# Patient Record
Sex: FEMALE | Race: BLACK OR AFRICAN AMERICAN | Employment: FULL TIME | URBAN - METROPOLITAN AREA
[De-identification: names, ages, dates, MRNs, and addresses within clinical notes are randomized per-mention and may not be internally consistent; named-entity substitution may affect disease eponyms.]

---

## 2018-10-21 ENCOUNTER — HOSPITAL ENCOUNTER (EMERGENCY)
Age: 38
Discharge: HOME | End: 2018-10-21
Attending: EMERGENCY MEDICINE
Payer: COMMERCIAL

## 2018-10-21 VITALS
TEMPERATURE: 97.4 F | RESPIRATION RATE: 16 BRPM | SYSTOLIC BLOOD PRESSURE: 136 MMHG | DIASTOLIC BLOOD PRESSURE: 87 MMHG | WEIGHT: 213.63 LBS | BODY MASS INDEX: 35.55 KG/M2 | HEART RATE: 79 BPM | OXYGEN SATURATION: 100 %

## 2018-10-21 DIAGNOSIS — B35.6 TINEA CRURIS: Primary | ICD-10-CM

## 2018-10-21 LAB
BACTERIA: ABNORMAL /HPF
BILIRUBIN URINE: NEGATIVE
BLOOD, URINE: NEGATIVE
CLARITY: ABNORMAL
COLOR: YELLOW
EPITHELIAL CELLS, UA: 15 /HPF (ref 0–5)
GLUCOSE URINE: NEGATIVE MG/DL
HCG(URINE) PREGNANCY TEST: NEGATIVE
HYALINE CASTS: 5 /LPF (ref 0–8)
KETONES, URINE: NEGATIVE MG/DL
LEUKOCYTE ESTERASE, URINE: NEGATIVE
MICROSCOPIC EXAMINATION: YES
NITRITE, URINE: NEGATIVE
PH UA: 7.5
PROTEIN UA: NEGATIVE MG/DL
RBC UA: 2 /HPF (ref 0–4)
SPECIFIC GRAVITY UA: 1.01
URINE REFLEX TO CULTURE: YES
URINE TYPE: ABNORMAL
UROBILINOGEN, URINE: 0.2 E.U./DL
WBC UA: 6 /HPF (ref 0–5)

## 2018-10-21 PROCEDURE — 87086 URINE CULTURE/COLONY COUNT: CPT

## 2018-10-21 PROCEDURE — 81001 URINALYSIS AUTO W/SCOPE: CPT

## 2018-10-21 PROCEDURE — 84703 CHORIONIC GONADOTROPIN ASSAY: CPT

## 2018-10-21 PROCEDURE — 99283 EMERGENCY DEPT VISIT LOW MDM: CPT

## 2018-10-21 ASSESSMENT — PAIN SCALES - GENERAL: PAINLEVEL_OUTOF10: 0

## 2018-10-21 NOTE — ED PROVIDER NOTES
mg by mouth daily      omeprazole (PRILOSEC) 20 MG delayed release capsule Take 20 mg by mouth daily      ibuprofen (ADVIL;MOTRIN) 800 MG tablet Take 800 mg by mouth every 8 hours as needed for Pain      albuterol sulfate HFA (PROVENTIL HFA) 108 (90 Base) MCG/ACT inhaler Inhale 2 puffs every 4 hours while awake for 7-10 days, then every 6 hours as needed for wheezing. Dispense with SPACER and instruct on use. May substitute Ventolin or Proventil as needed per insurance. 1 Inhaler 0    lidocaine (LIDODERM) 5 % Place 1 patch onto the skin daily 12 hours on, 12 hours off. **May substitute OTC patches if Rx not covered by insurance** 10 patch 0    cyclobenzaprine (FLEXERIL) 10 MG tablet Take 1 tablet by mouth 3 times daily as needed for Muscle spasms 30 tablet 0    acetaminophen (APAP EXTRA STRENGTH) 500 MG tablet Take 2 tablets by mouth every 6 hours as needed for Pain DO NOT TAKE WITH OTHER MEDICATIONS CONTAINING ACETAMINOPHEN. 30 tablet 0    ibuprofen (ADVIL;MOTRIN) 600 MG tablet Take 1 tablet by mouth every 6 hours as needed for Pain 30 tablet 0    naproxen (NAPROSYN) 500 MG tablet Take 1 tablet by mouth 2 times daily for 20 doses. 20 tablet 0     Allergies   Allergen Reactions    Other      Medications used to put her to sleep during surgery       Nursing Notes Reviewed    Physical Exam:  ED Triage Vitals [10/21/18 0416]   BP Temp Temp Source Pulse Resp SpO2 Height Weight   130/83 97.4 °F (36.3 °C) Oral 81 16 94 % -- 213 lb 10 oz (96.9 kg)     GENERAL APPEARANCE: Awake and alert. Cooperative. No acute distress. : Area of erythema and irritation externally above the vagina, and also in the lower perineal area. No vaginal discharge. No vesicles or ulcerations noted to the vaginal area. EXTREMITIES: No acute deformities. SKIN: Warm and dry. NEUROLOGICAL: No gross facial drooping. Moves all 4 extremitiesspontaneously.     Physical Exam    I have reviewed and interpreted all of the currently

## 2018-10-22 LAB — URINE CULTURE, ROUTINE: NORMAL

## 2018-11-28 ENCOUNTER — HOSPITAL ENCOUNTER (EMERGENCY)
Age: 38
Discharge: HOME | End: 2018-11-28
Attending: EMERGENCY MEDICINE
Payer: COMMERCIAL

## 2018-11-28 VITALS
RESPIRATION RATE: 14 BRPM | WEIGHT: 217.81 LBS | HEIGHT: 65 IN | DIASTOLIC BLOOD PRESSURE: 93 MMHG | HEART RATE: 62 BPM | TEMPERATURE: 98.4 F | BODY MASS INDEX: 36.29 KG/M2 | SYSTOLIC BLOOD PRESSURE: 134 MMHG | OXYGEN SATURATION: 100 %

## 2018-11-28 DIAGNOSIS — R42 VERTIGO: Primary | ICD-10-CM

## 2018-11-28 PROCEDURE — 6370000000 HC RX 637 (ALT 250 FOR IP): Performed by: EMERGENCY MEDICINE

## 2018-11-28 PROCEDURE — 99283 EMERGENCY DEPT VISIT LOW MDM: CPT

## 2018-11-28 RX ORDER — MECLIZINE HCL 12.5 MG/1
25 TABLET ORAL ONCE
Status: COMPLETED | OUTPATIENT
Start: 2018-11-28 | End: 2018-11-28

## 2018-11-28 RX ORDER — MECLIZINE HYDROCHLORIDE 25 MG/1
25 TABLET ORAL 3 TIMES DAILY PRN
Qty: 30 TABLET | Refills: 0 | Status: SHIPPED | OUTPATIENT
Start: 2018-11-28 | End: 2018-12-08

## 2018-11-28 RX ADMIN — MECLIZINE 25 MG: 12.5 TABLET ORAL at 03:51

## 2018-11-28 ASSESSMENT — PAIN SCALES - GENERAL: PAINLEVEL_OUTOF10: 0

## 2018-11-28 NOTE — ED PROVIDER NOTES
insurance. AMLODIPINE (NORVASC) 2.5 MG TABLET    Take 2.5 mg by mouth daily    CYCLOBENZAPRINE (FLEXERIL) 10 MG TABLET    Take 1 tablet by mouth 3 times daily as needed for Muscle spasms    IBUPROFEN (ADVIL;MOTRIN) 600 MG TABLET    Take 1 tablet by mouth every 6 hours as needed for Pain    IBUPROFEN (ADVIL;MOTRIN) 800 MG TABLET    Take 800 mg by mouth every 8 hours as needed for Pain    LIDOCAINE (LIDODERM) 5 %    Place 1 patch onto the skin daily 12 hours on, 12 hours off. **May substitute OTC patches if Rx not covered by insurance**    NAPROXEN (NAPROSYN) 500 MG TABLET    Take 1 tablet by mouth 2 times daily for 20 doses. NYSTATIN-TRIAMCINOLONE (MYCOLOG II) 625224-8.1 UNIT/GM-% CREAM    Apply topically 3 times daily. OMEPRAZOLE (PRILOSEC) 20 MG DELAYED RELEASE CAPSULE    Take 20 mg by mouth daily       ALLERGIES     Other    FAMILY HISTORY     History reviewed. No pertinent family history. SOCIAL HISTORY       Social History     Social History    Marital status: Single     Spouse name: N/A    Number of children: N/A    Years of education: N/A     Social History Main Topics    Smoking status: Former Smoker     Packs/day: 0.50     Types: Cigars    Smokeless tobacco: Never Used    Alcohol use No      Comment: occ    Drug use: Yes     Types: Marijuana      Comment: occ    Sexual activity: Not Currently      Comment: occasional     Other Topics Concern    None     Social History Narrative    ** Merged History Encounter **            SCREENINGS      @FLOW([id number])@      PHYSICAL EXAM    (up to 7 for level 4, 8 or more for level 5)     ED Triage Vitals [11/28/18 0344]   BP Temp Temp Source Pulse Resp SpO2 Height Weight   (!) 147/106 98.4 °F (36.9 °C) Oral 58 16 -- 5' 5\" (1.651 m) 217 lb 13 oz (98.8 kg)       Physical Exam      General Appearance:  Alert, cooperative, no distress, appears stated age. Head:  Normocephalic, without obviousabnormality, atraumatic.    Eyes:  conjunctiva/corneas

## 2019-01-24 ENCOUNTER — HOSPITAL ENCOUNTER (EMERGENCY)
Age: 39
Discharge: HOME | End: 2019-01-24
Attending: EMERGENCY MEDICINE
Payer: COMMERCIAL

## 2019-01-24 ENCOUNTER — APPOINTMENT (OUTPATIENT)
Dept: GENERAL RADIOLOGY | Age: 39
End: 2019-01-24
Payer: COMMERCIAL

## 2019-01-24 VITALS
BODY MASS INDEX: 32.99 KG/M2 | OXYGEN SATURATION: 98 % | WEIGHT: 198 LBS | RESPIRATION RATE: 16 BRPM | TEMPERATURE: 98.5 F | HEIGHT: 65 IN | SYSTOLIC BLOOD PRESSURE: 167 MMHG | DIASTOLIC BLOOD PRESSURE: 113 MMHG | HEART RATE: 68 BPM

## 2019-01-24 DIAGNOSIS — R07.89 CHEST WALL PAIN: Primary | ICD-10-CM

## 2019-01-24 LAB
A/G RATIO: 1.6 (ref 1.1–2.2)
ALBUMIN SERPL-MCNC: 4.5 G/DL (ref 3.4–5)
ALP BLD-CCNC: 82 U/L (ref 40–129)
ALT SERPL-CCNC: 11 U/L (ref 10–40)
ANION GAP SERPL CALCULATED.3IONS-SCNC: 9 MMOL/L (ref 3–16)
AST SERPL-CCNC: 13 U/L (ref 15–37)
BASOPHILS ABSOLUTE: 0 K/UL (ref 0–0.2)
BASOPHILS RELATIVE PERCENT: 0.9 %
BILIRUB SERPL-MCNC: 0.3 MG/DL (ref 0–1)
BUN BLDV-MCNC: 11 MG/DL (ref 7–20)
CALCIUM SERPL-MCNC: 9.8 MG/DL (ref 8.3–10.6)
CHLORIDE BLD-SCNC: 101 MMOL/L (ref 99–110)
CO2: 26 MMOL/L (ref 21–32)
CREAT SERPL-MCNC: 0.7 MG/DL (ref 0.6–1.1)
EKG ATRIAL RATE: 71 BPM
EKG DIAGNOSIS: NORMAL
EKG P AXIS: 58 DEGREES
EKG P-R INTERVAL: 136 MS
EKG Q-T INTERVAL: 356 MS
EKG QRS DURATION: 80 MS
EKG QTC CALCULATION (BAZETT): 386 MS
EKG R AXIS: 35 DEGREES
EKG T AXIS: 56 DEGREES
EKG VENTRICULAR RATE: 71 BPM
EOSINOPHILS ABSOLUTE: 0.3 K/UL (ref 0–0.6)
EOSINOPHILS RELATIVE PERCENT: 5 %
GFR AFRICAN AMERICAN: >60
GFR NON-AFRICAN AMERICAN: >60
GLOBULIN: 2.9 G/DL
GLUCOSE BLD-MCNC: 85 MG/DL (ref 70–99)
HCT VFR BLD CALC: 32.9 % (ref 36–48)
HEMOGLOBIN: 10.4 G/DL (ref 12–16)
LYMPHOCYTES ABSOLUTE: 1.7 K/UL (ref 1–5.1)
LYMPHOCYTES RELATIVE PERCENT: 28.4 %
MCH RBC QN AUTO: 24.5 PG (ref 26–34)
MCHC RBC AUTO-ENTMCNC: 31.6 G/DL (ref 31–36)
MCV RBC AUTO: 77.4 FL (ref 80–100)
MONOCYTES ABSOLUTE: 0.5 K/UL (ref 0–1.3)
MONOCYTES RELATIVE PERCENT: 8.5 %
NEUTROPHILS ABSOLUTE: 3.3 K/UL (ref 1.7–7.7)
NEUTROPHILS RELATIVE PERCENT: 57.2 %
PDW BLD-RTO: 17.1 % (ref 12.4–15.4)
PLATELET # BLD: 329 K/UL (ref 135–450)
PMV BLD AUTO: 8 FL (ref 5–10.5)
POTASSIUM SERPL-SCNC: 4.1 MMOL/L (ref 3.5–5.1)
RBC # BLD: 4.24 M/UL (ref 4–5.2)
SODIUM BLD-SCNC: 136 MMOL/L (ref 136–145)
TOTAL PROTEIN: 7.4 G/DL (ref 6.4–8.2)
TROPONIN: <0.01 NG/ML
TROPONIN: <0.01 NG/ML
WBC # BLD: 5.8 K/UL (ref 4–11)

## 2019-01-24 PROCEDURE — 80053 COMPREHEN METABOLIC PANEL: CPT

## 2019-01-24 PROCEDURE — 93010 ELECTROCARDIOGRAM REPORT: CPT | Performed by: INTERNAL MEDICINE

## 2019-01-24 PROCEDURE — 84484 ASSAY OF TROPONIN QUANT: CPT

## 2019-01-24 PROCEDURE — 99285 EMERGENCY DEPT VISIT HI MDM: CPT

## 2019-01-24 PROCEDURE — 71046 X-RAY EXAM CHEST 2 VIEWS: CPT

## 2019-01-24 PROCEDURE — 85025 COMPLETE CBC W/AUTO DIFF WBC: CPT

## 2019-01-24 PROCEDURE — 93005 ELECTROCARDIOGRAM TRACING: CPT | Performed by: EMERGENCY MEDICINE

## 2019-01-24 RX ORDER — AMLODIPINE BESYLATE 10 MG/1
10 TABLET ORAL DAILY
COMMUNITY
End: 2021-06-25

## 2019-01-24 RX ORDER — IBUPROFEN 600 MG/1
600 TABLET ORAL EVERY 6 HOURS PRN
Qty: 30 TABLET | Refills: 0 | Status: SHIPPED | OUTPATIENT
Start: 2019-01-24 | End: 2019-02-03 | Stop reason: ALTCHOICE

## 2019-01-24 ASSESSMENT — PAIN DESCRIPTION - FREQUENCY: FREQUENCY: CONTINUOUS

## 2019-01-24 ASSESSMENT — PAIN SCALES - GENERAL: PAINLEVEL_OUTOF10: 5

## 2019-01-24 ASSESSMENT — PAIN DESCRIPTION - DESCRIPTORS: DESCRIPTORS: PRESSURE

## 2019-01-24 ASSESSMENT — HEART SCORE: ECG: 0

## 2019-01-24 ASSESSMENT — PAIN DESCRIPTION - LOCATION: LOCATION: CHEST

## 2019-01-24 ASSESSMENT — PAIN DESCRIPTION - PAIN TYPE: TYPE: ACUTE PAIN

## 2019-02-03 ENCOUNTER — HOSPITAL ENCOUNTER (EMERGENCY)
Age: 39
Discharge: HOME | End: 2019-02-03

## 2019-02-03 VITALS
BODY MASS INDEX: 35.04 KG/M2 | OXYGEN SATURATION: 100 % | HEIGHT: 65 IN | SYSTOLIC BLOOD PRESSURE: 114 MMHG | RESPIRATION RATE: 16 BRPM | HEART RATE: 87 BPM | DIASTOLIC BLOOD PRESSURE: 61 MMHG | TEMPERATURE: 98.6 F | WEIGHT: 210.32 LBS

## 2019-02-03 DIAGNOSIS — M54.31 BILATERAL SCIATICA: Primary | ICD-10-CM

## 2019-02-03 DIAGNOSIS — M54.32 BILATERAL SCIATICA: Primary | ICD-10-CM

## 2019-02-03 LAB
BILIRUBIN URINE: NEGATIVE
BLOOD, URINE: NEGATIVE
CLARITY: ABNORMAL
COLOR: YELLOW
EPITHELIAL CELLS, UA: 4 /HPF (ref 0–5)
GLUCOSE URINE: NEGATIVE MG/DL
HCG(URINE) PREGNANCY TEST: NEGATIVE
HYALINE CASTS: 7 /LPF (ref 0–8)
KETONES, URINE: NEGATIVE MG/DL
LEUKOCYTE ESTERASE, URINE: NEGATIVE
MICROSCOPIC EXAMINATION: YES
NITRITE, URINE: NEGATIVE
PH UA: 6.5
PROTEIN UA: NEGATIVE MG/DL
RBC UA: 2 /HPF (ref 0–4)
SPECIFIC GRAVITY UA: 1.02
UROBILINOGEN, URINE: 0.2 E.U./DL
WBC UA: 1 /HPF (ref 0–5)

## 2019-02-03 PROCEDURE — 81001 URINALYSIS AUTO W/SCOPE: CPT

## 2019-02-03 PROCEDURE — 6370000000 HC RX 637 (ALT 250 FOR IP): Performed by: NURSE PRACTITIONER

## 2019-02-03 PROCEDURE — 84703 CHORIONIC GONADOTROPIN ASSAY: CPT

## 2019-02-03 PROCEDURE — 99283 EMERGENCY DEPT VISIT LOW MDM: CPT

## 2019-02-03 RX ORDER — PREDNISONE 20 MG/1
60 TABLET ORAL ONCE
Status: COMPLETED | OUTPATIENT
Start: 2019-02-03 | End: 2019-02-03

## 2019-02-03 RX ORDER — PREDNISONE 20 MG/1
TABLET ORAL
Qty: 18 TABLET | Refills: 0 | Status: SHIPPED | OUTPATIENT
Start: 2019-02-03 | End: 2019-02-13

## 2019-02-03 RX ORDER — CYCLOBENZAPRINE HCL 10 MG
10 TABLET ORAL ONCE
Status: COMPLETED | OUTPATIENT
Start: 2019-02-03 | End: 2019-02-03

## 2019-02-03 RX ORDER — CYCLOBENZAPRINE HCL 10 MG
10 TABLET ORAL 3 TIMES DAILY PRN
Qty: 30 TABLET | Refills: 0 | Status: SHIPPED | OUTPATIENT
Start: 2019-02-03 | End: 2019-02-13

## 2019-02-03 RX ADMIN — CYCLOBENZAPRINE HYDROCHLORIDE 10 MG: 10 TABLET, FILM COATED ORAL at 12:29

## 2019-02-03 RX ADMIN — PREDNISONE 60 MG: 20 TABLET ORAL at 12:29

## 2019-02-03 ASSESSMENT — PAIN DESCRIPTION - ORIENTATION: ORIENTATION: LOWER

## 2019-02-03 ASSESSMENT — PAIN SCALES - GENERAL: PAINLEVEL_OUTOF10: 5

## 2019-02-03 ASSESSMENT — PAIN DESCRIPTION - LOCATION: LOCATION: BACK;ABDOMEN

## 2019-02-03 ASSESSMENT — PAIN DESCRIPTION - PAIN TYPE: TYPE: ACUTE PAIN

## 2019-08-07 ENCOUNTER — APPOINTMENT (OUTPATIENT)
Dept: GENERAL RADIOLOGY | Age: 39
End: 2019-08-07
Payer: COMMERCIAL

## 2019-08-07 ENCOUNTER — HOSPITAL ENCOUNTER (EMERGENCY)
Age: 39
Discharge: HOME | End: 2019-08-07
Attending: EMERGENCY MEDICINE
Payer: COMMERCIAL

## 2019-08-07 VITALS
WEIGHT: 198 LBS | DIASTOLIC BLOOD PRESSURE: 91 MMHG | RESPIRATION RATE: 16 BRPM | HEART RATE: 73 BPM | HEIGHT: 65 IN | SYSTOLIC BLOOD PRESSURE: 110 MMHG | OXYGEN SATURATION: 98 % | BODY MASS INDEX: 32.99 KG/M2 | TEMPERATURE: 98.4 F

## 2019-08-07 DIAGNOSIS — R07.9 CHEST PAIN, UNSPECIFIED TYPE: Primary | ICD-10-CM

## 2019-08-07 DIAGNOSIS — R10.13 ABDOMINAL PAIN, EPIGASTRIC: ICD-10-CM

## 2019-08-07 LAB
ANION GAP SERPL CALCULATED.3IONS-SCNC: 12 MMOL/L (ref 3–16)
BASOPHILS ABSOLUTE: 0.1 K/UL (ref 0–0.2)
BASOPHILS RELATIVE PERCENT: 1.5 %
BILIRUBIN URINE: NEGATIVE
BLOOD, URINE: NEGATIVE
BUN BLDV-MCNC: 10 MG/DL (ref 7–20)
CALCIUM SERPL-MCNC: 9.5 MG/DL (ref 8.3–10.6)
CHLORIDE BLD-SCNC: 102 MMOL/L (ref 99–110)
CLARITY: CLEAR
CO2: 21 MMOL/L (ref 21–32)
COLOR: YELLOW
CREAT SERPL-MCNC: 0.7 MG/DL (ref 0.6–1.1)
D DIMER: 201 NG/ML DDU (ref 0–229)
EOSINOPHILS ABSOLUTE: 0.3 K/UL (ref 0–0.6)
EOSINOPHILS RELATIVE PERCENT: 6.2 %
GFR AFRICAN AMERICAN: >60
GFR NON-AFRICAN AMERICAN: >60
GLUCOSE BLD-MCNC: 82 MG/DL (ref 70–99)
GLUCOSE URINE: NEGATIVE MG/DL
HCG(URINE) PREGNANCY TEST: NEGATIVE
HCT VFR BLD CALC: 36.2 % (ref 36–48)
HEMOGLOBIN: 11.3 G/DL (ref 12–16)
KETONES, URINE: NEGATIVE MG/DL
LEUKOCYTE ESTERASE, URINE: NEGATIVE
LIPASE: 20 U/L (ref 13–60)
LYMPHOCYTES ABSOLUTE: 2.1 K/UL (ref 1–5.1)
LYMPHOCYTES RELATIVE PERCENT: 41.8 %
MCH RBC QN AUTO: 23.7 PG (ref 26–34)
MCHC RBC AUTO-ENTMCNC: 31.1 G/DL (ref 31–36)
MCV RBC AUTO: 76.3 FL (ref 80–100)
MICROSCOPIC EXAMINATION: NORMAL
MONOCYTES ABSOLUTE: 0.4 K/UL (ref 0–1.3)
MONOCYTES RELATIVE PERCENT: 7.8 %
NEUTROPHILS ABSOLUTE: 2.1 K/UL (ref 1.7–7.7)
NEUTROPHILS RELATIVE PERCENT: 42.7 %
NITRITE, URINE: NEGATIVE
PDW BLD-RTO: 20 % (ref 12.4–15.4)
PH UA: 6 (ref 5–8)
PLATELET # BLD: 280 K/UL (ref 135–450)
PMV BLD AUTO: 8 FL (ref 5–10.5)
POTASSIUM SERPL-SCNC: 4.4 MMOL/L (ref 3.5–5.1)
PROTEIN UA: NEGATIVE MG/DL
RBC # BLD: 4.75 M/UL (ref 4–5.2)
SODIUM BLD-SCNC: 135 MMOL/L (ref 136–145)
SPECIFIC GRAVITY UA: 1.01 (ref 1–1.03)
TROPONIN: <0.01 NG/ML
URINE TYPE: NORMAL
UROBILINOGEN, URINE: 0.2 E.U./DL
WBC # BLD: 5 K/UL (ref 4–11)

## 2019-08-07 PROCEDURE — 93005 ELECTROCARDIOGRAM TRACING: CPT | Performed by: EMERGENCY MEDICINE

## 2019-08-07 PROCEDURE — 99285 EMERGENCY DEPT VISIT HI MDM: CPT

## 2019-08-07 PROCEDURE — 80048 BASIC METABOLIC PNL TOTAL CA: CPT

## 2019-08-07 PROCEDURE — 81003 URINALYSIS AUTO W/O SCOPE: CPT

## 2019-08-07 PROCEDURE — 71046 X-RAY EXAM CHEST 2 VIEWS: CPT

## 2019-08-07 PROCEDURE — 84703 CHORIONIC GONADOTROPIN ASSAY: CPT

## 2019-08-07 PROCEDURE — 36415 COLL VENOUS BLD VENIPUNCTURE: CPT

## 2019-08-07 PROCEDURE — 85379 FIBRIN DEGRADATION QUANT: CPT

## 2019-08-07 PROCEDURE — 84484 ASSAY OF TROPONIN QUANT: CPT

## 2019-08-07 PROCEDURE — 85025 COMPLETE CBC W/AUTO DIFF WBC: CPT

## 2019-08-07 PROCEDURE — 83690 ASSAY OF LIPASE: CPT

## 2019-08-07 RX ORDER — METRONIDAZOLE 500 MG/1
500 TABLET ORAL 2 TIMES DAILY
COMMUNITY
End: 2021-06-25 | Stop reason: ALTCHOICE

## 2019-08-07 RX ORDER — DOXYCYCLINE HYCLATE 100 MG/1
100 CAPSULE ORAL 2 TIMES DAILY
COMMUNITY
End: 2021-06-25 | Stop reason: ALTCHOICE

## 2019-08-07 RX ORDER — SULFAMETHOXAZOLE AND TRIMETHOPRIM 800; 160 MG/1; MG/1
1 TABLET ORAL 2 TIMES DAILY
COMMUNITY
End: 2021-06-25 | Stop reason: ALTCHOICE

## 2019-08-07 RX ORDER — CYCLOBENZAPRINE HCL 10 MG
10 TABLET ORAL NIGHTLY PRN
COMMUNITY
End: 2021-06-25 | Stop reason: ALTCHOICE

## 2019-08-07 ASSESSMENT — ENCOUNTER SYMPTOMS
NAUSEA: 0
ABDOMINAL PAIN: 1
CONSTIPATION: 0
EYES NEGATIVE: 1
DIARRHEA: 0

## 2019-08-07 ASSESSMENT — PAIN SCALES - GENERAL: PAINLEVEL_OUTOF10: 5

## 2019-08-07 ASSESSMENT — PAIN DESCRIPTION - PAIN TYPE: TYPE: ACUTE PAIN

## 2019-08-07 ASSESSMENT — PAIN DESCRIPTION - LOCATION: LOCATION: ABDOMEN;CHEST

## 2019-08-07 NOTE — ED PROVIDER NOTES
content normal.   Nursing note and vitals reviewed. Diagnostic Results     EKG   Normal sinus rhythm with no ST or T wave abnormalities occasional PVC unifocal and normal conduction times. Normal axis. RADIOLOGY:  XR CHEST STANDARD (2 VW)   Final Result   1.  No evidence of acute cardiopulmonary disease          LABS:   Results for orders placed or performed during the hospital encounter of 08/07/19   D-dimer, quantitative (Lab)   Result Value Ref Range    D-Dimer, Quant 201 0 - 229 ng/mL DDU   CBC auto differential   Result Value Ref Range    WBC 5.0 4.0 - 11.0 K/uL    RBC 4.75 4.00 - 5.20 M/uL    Hemoglobin 11.3 (L) 12.0 - 16.0 g/dL    Hematocrit 36.2 36.0 - 48.0 %    MCV 76.3 (L) 80.0 - 100.0 fL    MCH 23.7 (L) 26.0 - 34.0 pg    MCHC 31.1 31.0 - 36.0 g/dL    RDW 20.0 (H) 12.4 - 15.4 %    Platelets 363 618 - 465 K/uL    MPV 8.0 5.0 - 10.5 fL    Neutrophils % 42.7 %    Lymphocytes % 41.8 %    Monocytes % 7.8 %    Eosinophils % 6.2 %    Basophils % 1.5 %    Neutrophils # 2.1 1.7 - 7.7 K/uL    Lymphocytes # 2.1 1.0 - 5.1 K/uL    Monocytes # 0.4 0.0 - 1.3 K/uL    Eosinophils # 0.3 0.0 - 0.6 K/uL    Basophils # 0.1 0.0 - 0.2 K/uL   Basic Metabolic Panel   Result Value Ref Range    Sodium 135 (L) 136 - 145 mmol/L    Potassium 4.4 3.5 - 5.1 mmol/L    Chloride 102 99 - 110 mmol/L    CO2 21 21 - 32 mmol/L    Anion Gap 12 3 - 16    Glucose 82 70 - 99 mg/dL    BUN 10 7 - 20 mg/dL    CREATININE 0.7 0.6 - 1.1 mg/dL    GFR Non-African American >60 >60    GFR African American >60 >60    Calcium 9.5 8.3 - 10.6 mg/dL   Urinalysis   Result Value Ref Range    Color, UA Yellow Straw/Yellow    Clarity, UA Clear Clear    Glucose, Ur Negative Negative mg/dL    Bilirubin Urine Negative Negative    Ketones, Urine Negative Negative mg/dL    Specific Gravity, UA 1.010 1.005 - 1.030    Blood, Urine Negative Negative    pH, UA 6.0 5.0 - 8.0    Protein, UA Negative Negative mg/dL    Urobilinogen, Urine 0.2 <2.0 E.U./dL    Nitrite, Urine Negative Negative    Leukocyte Esterase, Urine Negative Negative    Microscopic Examination Not Indicated     Urine Type Not Specified    Pregnancy, urine   Result Value Ref Range    HCG(Urine) Pregnancy Test Negative Detects HCG level >20 MIU/mL   Troponin   Result Value Ref Range    Troponin <0.01 <0.01 ng/mL   Lipase   Result Value Ref Range    Lipase 20.0 13.0 - 60.0 U/L           RECENT VITALS:  BP: (!) 110/91,Temp: 98.4 °F (36.9 °C), Pulse: 73, Resp: 16, SpO2: 98 %     Procedures         ED Course     Nursing Notes, Past Medical Hx, Past Surgical Hx, Social Hx,Allergies, and Family Hx were reviewed. The patient was given the following medications:  No orders of the defined types were placed in this encounter. CONSULTS:  None    MEDICAL DECISIONMAKING / ASSESSMENT / PLAN     Nayeli Augustine is a 35 y.o. female presents with sharp lower chest and upper abdominal pain. . I believe patient's symptoms may be secondary to her Bactrim or doxycycline which she is on. Patient was on the Bactrim and doxycycline for cellulitis to her right leg. I looked at her leg and she has a very small pimple there with no surrounding redness or erythema and she states it has improved. Today was the fifth or 6th day of antibiotics and I told her she can stop these. Clinical Impression     1. Chest pain, unspecified type    2. Abdominal pain, epigastric        Disposition     PATIENT REFERRED TO:  Marko Ojeda 55  [address] [address]  [phone]    Schedule an appointment as soon as possible for a visit   As needed      DISCHARGE MEDICATIONS:  Discharge Medication List as of 8/7/2019  2:43 PM          DISPOSITION discharge home.        Khoa Villegas MD  08/08/19 2146

## 2019-08-09 LAB
EKG ATRIAL RATE: 73 BPM
EKG DIAGNOSIS: NORMAL
EKG P AXIS: 63 DEGREES
EKG P-R INTERVAL: 128 MS
EKG Q-T INTERVAL: 384 MS
EKG QRS DURATION: 82 MS
EKG QTC CALCULATION (BAZETT): 423 MS
EKG R AXIS: 20 DEGREES
EKG T AXIS: 45 DEGREES
EKG VENTRICULAR RATE: 73 BPM

## 2020-03-04 ENCOUNTER — HOSPITAL ENCOUNTER (EMERGENCY)
Age: 40
Discharge: HOME | End: 2020-03-04
Attending: EMERGENCY MEDICINE
Payer: COMMERCIAL

## 2020-03-04 ENCOUNTER — APPOINTMENT (OUTPATIENT)
Dept: GENERAL RADIOLOGY | Age: 40
End: 2020-03-04
Payer: COMMERCIAL

## 2020-03-04 VITALS
RESPIRATION RATE: 19 BRPM | TEMPERATURE: 98.1 F | SYSTOLIC BLOOD PRESSURE: 149 MMHG | HEART RATE: 65 BPM | DIASTOLIC BLOOD PRESSURE: 91 MMHG

## 2020-03-04 LAB
AMORPHOUS: ABNORMAL /HPF
ANION GAP SERPL CALCULATED.3IONS-SCNC: 12 MMOL/L (ref 3–16)
BACTERIA: ABNORMAL /HPF
BASOPHILS ABSOLUTE: 0 K/UL (ref 0–0.2)
BASOPHILS RELATIVE PERCENT: 0.5 %
BILIRUBIN URINE: NEGATIVE
BLOOD, URINE: ABNORMAL
BUN BLDV-MCNC: 11 MG/DL (ref 7–20)
CALCIUM SERPL-MCNC: 9.3 MG/DL (ref 8.3–10.6)
CHLORIDE BLD-SCNC: 103 MMOL/L (ref 99–110)
CHP ED QC CHECK: YES
CLARITY: CLEAR
CO2: 19 MMOL/L (ref 21–32)
COLOR: YELLOW
CREAT SERPL-MCNC: 0.6 MG/DL (ref 0.6–1.1)
EKG ATRIAL RATE: 67 BPM
EKG DIAGNOSIS: NORMAL
EKG P AXIS: 64 DEGREES
EKG P-R INTERVAL: 138 MS
EKG Q-T INTERVAL: 380 MS
EKG QRS DURATION: 86 MS
EKG QTC CALCULATION (BAZETT): 401 MS
EKG R AXIS: 29 DEGREES
EKG T AXIS: 53 DEGREES
EKG VENTRICULAR RATE: 67 BPM
EOSINOPHILS ABSOLUTE: 0.1 K/UL (ref 0–0.6)
EOSINOPHILS RELATIVE PERCENT: 3.8 %
EPITHELIAL CELLS, UA: ABNORMAL /HPF (ref 0–5)
GFR AFRICAN AMERICAN: >60
GFR NON-AFRICAN AMERICAN: >60
GLUCOSE BLD-MCNC: 83 MG/DL (ref 70–99)
GLUCOSE URINE: NEGATIVE MG/DL
HCG(URINE) PREGNANCY TEST: NEGATIVE
HCT VFR BLD CALC: 29.2 % (ref 36–48)
HEMOCCULT STL QL: NEGATIVE
HEMOGLOBIN: 9.1 G/DL (ref 12–16)
KETONES, URINE: NEGATIVE MG/DL
LEUKOCYTE ESTERASE, URINE: NEGATIVE
LYMPHOCYTES ABSOLUTE: 1.1 K/UL (ref 1–5.1)
LYMPHOCYTES RELATIVE PERCENT: 32.1 %
MCH RBC QN AUTO: 23.3 PG (ref 26–34)
MCHC RBC AUTO-ENTMCNC: 31.3 G/DL (ref 31–36)
MCV RBC AUTO: 74.4 FL (ref 80–100)
MICROSCOPIC EXAMINATION: YES
MONOCYTES ABSOLUTE: 0.3 K/UL (ref 0–1.3)
MONOCYTES RELATIVE PERCENT: 9.3 %
MUCUS: ABNORMAL /LPF
NEUTROPHILS ABSOLUTE: 1.9 K/UL (ref 1.7–7.7)
NEUTROPHILS RELATIVE PERCENT: 54.3 %
NITRITE, URINE: NEGATIVE
PDW BLD-RTO: 19.2 % (ref 12.4–15.4)
PH UA: 6.5 (ref 5–8)
PLATELET # BLD: 310 K/UL (ref 135–450)
PMV BLD AUTO: 7.6 FL (ref 5–10.5)
POTASSIUM REFLEX MAGNESIUM: 4.5 MMOL/L (ref 3.5–5.1)
PRO-BNP: 137 PG/ML (ref 0–124)
PROTEIN UA: NEGATIVE MG/DL
RBC # BLD: 3.92 M/UL (ref 4–5.2)
RBC UA: ABNORMAL /HPF (ref 0–4)
SODIUM BLD-SCNC: 134 MMOL/L (ref 136–145)
SPECIFIC GRAVITY UA: 1.01 (ref 1–1.03)
TROPONIN: <0.01 NG/ML
URINE TYPE: ABNORMAL
UROBILINOGEN, URINE: 0.2 E.U./DL
WBC # BLD: 3.4 K/UL (ref 4–11)
WBC UA: ABNORMAL /HPF (ref 0–5)

## 2020-03-04 PROCEDURE — 93005 ELECTROCARDIOGRAM TRACING: CPT | Performed by: PHYSICIAN ASSISTANT

## 2020-03-04 PROCEDURE — 84703 CHORIONIC GONADOTROPIN ASSAY: CPT

## 2020-03-04 PROCEDURE — 83880 ASSAY OF NATRIURETIC PEPTIDE: CPT

## 2020-03-04 PROCEDURE — 85025 COMPLETE CBC W/AUTO DIFF WBC: CPT

## 2020-03-04 PROCEDURE — 2580000003 HC RX 258: Performed by: PHYSICIAN ASSISTANT

## 2020-03-04 PROCEDURE — 80048 BASIC METABOLIC PNL TOTAL CA: CPT

## 2020-03-04 PROCEDURE — 99284 EMERGENCY DEPT VISIT MOD MDM: CPT

## 2020-03-04 PROCEDURE — 84484 ASSAY OF TROPONIN QUANT: CPT

## 2020-03-04 PROCEDURE — 71046 X-RAY EXAM CHEST 2 VIEWS: CPT

## 2020-03-04 PROCEDURE — 81001 URINALYSIS AUTO W/SCOPE: CPT

## 2020-03-04 RX ORDER — SODIUM CHLORIDE, SODIUM LACTATE, POTASSIUM CHLORIDE, CALCIUM CHLORIDE 600; 310; 30; 20 MG/100ML; MG/100ML; MG/100ML; MG/100ML
1000 INJECTION, SOLUTION INTRAVENOUS ONCE
Status: COMPLETED | OUTPATIENT
Start: 2020-03-04 | End: 2020-03-04

## 2020-03-04 RX ADMIN — SODIUM CHLORIDE, SODIUM LACTATE, POTASSIUM CHLORIDE, AND CALCIUM CHLORIDE 1000 ML: .6; .31; .03; .02 INJECTION, SOLUTION INTRAVENOUS at 12:46

## 2020-03-04 ASSESSMENT — ENCOUNTER SYMPTOMS
PHOTOPHOBIA: 0
BLOOD IN STOOL: 1
SORE THROAT: 0
DIARRHEA: 0
BACK PAIN: 0
ABDOMINAL PAIN: 0
RECTAL PAIN: 0
RHINORRHEA: 0
CHEST TIGHTNESS: 0
FACIAL SWELLING: 0
COUGH: 1
WHEEZING: 0
STRIDOR: 0
VOMITING: 0
NAUSEA: 1
TROUBLE SWALLOWING: 0
SINUS PRESSURE: 0
SHORTNESS OF BREATH: 0

## 2020-03-04 NOTE — ED NOTES
Patient brought by EMS for intermittent dizziness began last night. Patient reports dizziness is worse with standing. Denies chest pain, HA or SOB. EKG completed on arrival to ED.       Ashley Atkins RN  03/04/20 6043

## 2020-03-04 NOTE — ED PROVIDER NOTES
is clear. Uvula midline. Eyes:      Conjunctiva/sclera: Conjunctivae normal.      Pupils: Pupils are equal, round, and reactive to light. Neck:      Musculoskeletal: Normal range of motion and neck supple. Cardiovascular:      Rate and Rhythm: Normal rate and regular rhythm. Pulses: Normal pulses. Heart sounds: Normal heart sounds. Pulmonary:      Effort: Pulmonary effort is normal.      Breath sounds: Normal breath sounds. Chest:      Chest wall: No tenderness. Abdominal:      General: Abdomen is flat. Bowel sounds are normal. There is no distension. Palpations: Abdomen is soft. Tenderness: There is no abdominal tenderness. There is no right CVA tenderness, left CVA tenderness or guarding. Genitourinary:     Rectum: Normal. Guaiac result negative. Comments: There was no obvious hemorrhoids or fissures noted. No melena. Guaiac negative. No rectal bleeding. Musculoskeletal: Normal range of motion. General: No swelling or tenderness. Right lower leg: No edema. Left lower leg: No edema. Skin:     General: Skin is warm and dry. Neurological:      General: No focal deficit present. Mental Status: She is alert and oriented to person, place, and time. Cranial Nerves: Cranial nerves are intact. No cranial nerve deficit. Sensory: Sensation is intact. No sensory deficit. Motor: Motor function is intact. No weakness.          Diagnostic Results     EKG   Interpreted in conjunction with emergency department physician DR Ming Barrera    Rhythm: normal sinus   Rate: normal HR 67  Axis: normal  Ectopy: none  Conduction: normal  ST Segments: no acute change  T Waves: no acute change  Q Waves: none    Clinical Impression: no acute changes and normal EKG    RADIOLOGY:  XR CHEST STANDARD (2 VW)   Final Result      No acute radiographic abnormality of the chest.          LABS:   Results for orders placed or performed during the hospital encounter of MIU/mL   Microscopic Urinalysis   Result Value Ref Range    Mucus, UA 1+ (A) None Seen /LPF    WBC, UA 0-2 0 - 5 /HPF    RBC, UA 3-4 0 - 4 /HPF    Epithelial Cells, UA 6-10 (A) 0 - 5 /HPF    Bacteria, UA 1+ (A) None Seen /HPF    Amorphous, UA Rare /HPF   EKG 12 Lead   Result Value Ref Range    Ventricular Rate 67 BPM    Atrial Rate 67 BPM    P-R Interval 138 ms    QRS Duration 86 ms    Q-T Interval 380 ms    QTc Calculation (Bazett) 401 ms    P Axis 64 degrees    R Axis 29 degrees    T Axis 53 degrees    Diagnosis       EKG performed in ER and to be interpreted by ER physician. Confirmed by MD, ER ([id number]),  Eric Reeves (NONA), DELVIN ([id number]) on 3/4/2020 12:49:49 PM         RECENT VITALS:  BP: (!) 149/91, Temp: 98.1 °F (36.7 °C), Pulse: 65, Resp: 19,       Procedures       ED Course     Nursing Notes, Past Medical Hx,Past Surgical Hx, Social Hx, Allergies, and Family Hx were reviewed. The patient was given the following medications:  Orders Placed This Encounter   Medications    lactated ringers infusion 1,000 mL       CONSULTS:  [facility] / ASSESSMENT / Valentine Donnie is a 35 y.o. female presents to the emergency department complaining of lightheadedness over the last 2 days. Denies syncope. Denies chest pain or shortness of breath. She states her menses has been heavy over the last few days. She also complained of bleeding with bowel movements that is been going on for the last 1 to 2 months. CBC today shows white count of 3.4 with hemoglobin of 9.1 and 310 platelets. Last hemoglobin 10.9 in October 2019 and 11.3 August 2019. BMP shows creatinine of 0.6 and glucose of 83. EKG shows a normal sinus rhythm with no ischemic change. Troponin less than 0.01 and proBNP 137. Urinalysis showed moderate blood with 3-4 red blood cells and no signs of UTI. Pregnancy test negative. Chest x-ray was negative for acute disease.   Patient had no fissure or hemorrhoid or rectal bleeding on exam today. Patient has no rectal bleeding noted. She does have a slow decline in her hemoglobin since August 2019. I discussed with the patient a plan for follow-up concerning her anemia. She states that she does have iron pills that she supposed to be taking at home which I encouraged her to restart. Since she is lightheaded with hemoglobin slowly declining I did offer admission for observation to recheck the hemoglobin. Patient states her son is sick and she does not want to be admitted at this time. She would prefer to follow-up as an outpatient. I do have a call to [address] to try to arrange follow-up in the next 48 hours for CBC recheck. I explained to her that if she cannot get into her clinic that she should come back here in 2 days for recheck. If she feels lightheaded, dizzy, syncope, chest pain or shortness of breath or any worsening symptoms or worsening bleeding she can return to the emergency department. She is on her menses now but there is no active rectal bleeding. She states that rectal bleeding has been going on for several months. I will refer her to GI for follow-up on this. She is also referred to PCP for follow-up. She will start her iron pills back today. She can return if any worsening symptoms. Patient stable for discharge. This patient was also evaluated by the attending physician. All care plans were discussed and agreed upon. Clinical Impression     1. Lightheadedness    2.  Anemia, unspecified type        Disposition     PATIENT REFERRED TO:  Raine Dumont MD  [address]  [phone]    Schedule an appointment as soon as possible for a visit   with GI      DISCHARGE MEDICATIONS:  New Prescriptions    No medications on file       DISPOSITION  discharged        Raj Farrar  03/04/20 9784

## 2020-03-04 NOTE — ED NOTES
Orthostatic VS completed on arrival to ED. Patient reports dizziness with sitting and standing.              Mala Del Rosario RN  03/04/20 1948

## 2020-03-04 NOTE — ED PROVIDER NOTES
ED Attending Attestation Note     Date of evaluation: 3/4/2020    This patient was seen by the advance practice provider. I have seen and examined the patient, agree with the workup, evaluation, management and diagnosis. The care plan has been discussed. I have reviewed the ECG and concur with the RAKEL's interpretation. My assessment reveals woman in no acute distress. Abdomen soft. We discussed hospitalization versus close follow-up for her hemoglobin of 9. She states she has a young child at home, and cannot be admitted. She understands that she can come back here at any time to for continued evaluation.         Devora Tom MD  03/04/20 1887

## 2021-01-14 ENCOUNTER — NURSE TRIAGE (OUTPATIENT)
Dept: OTHER | Facility: CLINIC | Age: 41
End: 2021-01-14

## 2021-01-14 NOTE — TELEPHONE ENCOUNTER
Reason for Disposition   [1] HIGH RISK patient (e.g., age > 59 years, diabetes, heart or lung disease, weak immune system) AND [2] new or worsening symptoms    Answer Assessment - Initial Assessment Questions  1. COVID-19 DIAGNOSIS: \"Who made your Coronavirus (COVID-19) diagnosis? \" \"Was it confirmed by a positive lab test?\" If not diagnosed by a HCP, ask \"Are there lots of cases (community spread) where you live? \" (See public health department website, if unsure)     positive 1/11    2. COVID-19 EXPOSURE: \"Was there any known exposure to COVID before the symptoms began? \" [facility] Definition of close contact: within 6 feet (2 meters) for a total of 15 minutes or more over a 24-hour period. 3. ONSET: \"When did the COVID-19 symptoms start?\"       1/9    4. WORST SYMPTOM: \"What is your worst symptom? \" (e.g., cough, fever, shortness of breath, muscle aches)      Bruising    5. COUGH: \"Do you have a cough? \" If so, ask: \"How bad is the cough? \"       occas cough    6. FEVER: \"Do you have a fever? \" If so, ask: \"What is your temperature, how was it measured, and when did it start? \"     Denies    7. RESPIRATORY STATUS: \"Describe your breathing? \" (e.g., shortness of breath, wheezing, unable to speak)       Denies    8. BETTER-SAME-WORSE: Clearnce Idler you getting better, staying the same or getting worse compared to yesterday? \"  If getting worse, ask, \"In what way? \"     Bruising worse    9. HIGH RISK DISEASE: \"Do you have any chronic medical problems? \" (e.g., asthma, heart or lung disease, weak immune system, obesity, etc.)     Cad, htn, sickle cell    10. PREGNANCY: \"Is there any chance you are pregnant? \" \"When was your last menstrual period? \"            11. OTHER SYMPTOMS: \"Do you have any other symptoms? \"  (e.g., chills, fatigue, headache, loss of smell or taste, muscle pain, sore throat; new loss of smell or taste especially support the diagnosis of COVID-19)        H/a, bruising    Protocols used: CORONAVIRUS (COVID-19) DIAGNOSED OR SUSPECTED-ADULT-AH    [facility] [place] [address] [facility] Brief description of triage:as above    Triage indicates for patient to be seen today    Care advice provided. Recommended using local department of health website for testing locations and up to date information. Caller verbalizes understanding, denies any other questions or concerns; instructed to call back for any new or worsening symptoms.

## 2021-01-21 ENCOUNTER — APPOINTMENT (OUTPATIENT)
Dept: GENERAL RADIOLOGY | Age: 41
End: 2021-01-21
Payer: COMMERCIAL

## 2021-01-21 ENCOUNTER — HOSPITAL ENCOUNTER (EMERGENCY)
Age: 41
Discharge: HOME | End: 2021-01-21
Payer: COMMERCIAL

## 2021-01-21 VITALS
WEIGHT: 215.17 LBS | BODY MASS INDEX: 35.85 KG/M2 | OXYGEN SATURATION: 98 % | DIASTOLIC BLOOD PRESSURE: 89 MMHG | TEMPERATURE: 98.5 F | HEART RATE: 88 BPM | SYSTOLIC BLOOD PRESSURE: 141 MMHG | RESPIRATION RATE: 16 BRPM | HEIGHT: 65 IN

## 2021-01-21 DIAGNOSIS — R05.9 COUGH: Primary | ICD-10-CM

## 2021-01-21 DIAGNOSIS — Z20.822 SUSPECTED COVID-19 VIRUS INFECTION: ICD-10-CM

## 2021-01-21 DIAGNOSIS — M79.10 MYALGIA: ICD-10-CM

## 2021-01-21 LAB
BASOPHILS ABSOLUTE: 0.1 K/UL (ref 0–0.2)
BASOPHILS RELATIVE PERCENT: 1.2 %
EOSINOPHILS ABSOLUTE: 0.4 K/UL (ref 0–0.6)
EOSINOPHILS RELATIVE PERCENT: 7.8 %
HCT VFR BLD CALC: 39.7 % (ref 36–48)
HEMOGLOBIN: 13.1 G/DL (ref 12–16)
LYMPHOCYTES ABSOLUTE: 1.7 K/UL (ref 1–5.1)
LYMPHOCYTES RELATIVE PERCENT: 31.7 %
MCH RBC QN AUTO: 29.1 PG (ref 26–34)
MCHC RBC AUTO-ENTMCNC: 32.9 G/DL (ref 31–36)
MCV RBC AUTO: 88.6 FL (ref 80–100)
MONOCYTES ABSOLUTE: 0.6 K/UL (ref 0–1.3)
MONOCYTES RELATIVE PERCENT: 10.7 %
NEUTROPHILS ABSOLUTE: 2.6 K/UL (ref 1.7–7.7)
NEUTROPHILS RELATIVE PERCENT: 48.6 %
PDW BLD-RTO: 15.9 % (ref 12.4–15.4)
PLATELET # BLD: 256 K/UL (ref 135–450)
PMV BLD AUTO: 7.8 FL (ref 5–10.5)
RBC # BLD: 4.48 M/UL (ref 4–5.2)
SARS-COV-2: NOT DETECTED
WBC # BLD: 5.3 K/UL (ref 4–11)

## 2021-01-21 PROCEDURE — 36415 COLL VENOUS BLD VENIPUNCTURE: CPT

## 2021-01-21 PROCEDURE — U0003 INFECTIOUS AGENT DETECTION BY NUCLEIC ACID (DNA OR RNA); SEVERE ACUTE RESPIRATORY SYNDROME CORONAVIRUS 2 (SARS-COV-2) (CORONAVIRUS DISEASE [COVID-19]), AMPLIFIED PROBE TECHNIQUE, MAKING USE OF HIGH THROUGHPUT TECHNOLOGIES AS DESCRIBED BY CMS-2020-01-R: HCPCS

## 2021-01-21 PROCEDURE — 71045 X-RAY EXAM CHEST 1 VIEW: CPT

## 2021-01-21 PROCEDURE — 85025 COMPLETE CBC W/AUTO DIFF WBC: CPT

## 2021-01-21 PROCEDURE — 99283 EMERGENCY DEPT VISIT LOW MDM: CPT

## 2021-01-21 RX ORDER — ACETAMINOPHEN 500 MG
500 TABLET ORAL 4 TIMES DAILY PRN
Qty: 20 TABLET | Refills: 0 | Status: SHIPPED | OUTPATIENT
Start: 2021-01-21 | End: 2021-04-29

## 2021-01-21 RX ORDER — ONDANSETRON 4 MG/1
4 TABLET, FILM COATED ORAL DAILY PRN
Qty: 20 TABLET | Refills: 0 | Status: SHIPPED | OUTPATIENT
Start: 2021-01-21 | End: 2021-06-25 | Stop reason: ALTCHOICE

## 2021-01-21 RX ORDER — GUAIFENESIN/DEXTROMETHORPHAN 100-10MG/5
5 SYRUP ORAL ONCE
Status: COMPLETED | OUTPATIENT
Start: 2021-01-21 | End: 2021-01-21

## 2021-01-21 RX ORDER — GUAIFENESIN/DEXTROMETHORPHAN 100-10MG/5
5 SYRUP ORAL 3 TIMES DAILY PRN
Qty: 50 ML | Refills: 0 | Status: SHIPPED | OUTPATIENT
Start: 2021-01-21 | End: 2021-06-25 | Stop reason: ALTCHOICE

## 2021-01-21 RX ADMIN — Medication 5 ML: at 16:51

## 2021-01-21 NOTE — ED PROVIDER NOTES
[address] [facility]        Pt Name: Kay Oneil  MRN: [id number]  [place] 1980  Date of evaluation: 1/21/2021  Provider: NAKUL Reyes - CNP  PCP: Marko Ojeda 55    RAKEL. I have evaluated this patient. My supervising physician was available for consultation. CHIEF COMPLAINT       Chief Complaint   Patient presents with    Concern For COVID-19     headache, sore throat, bruising, feels dehydrated       HISTORY OF PRESENT ILLNESS   (Location, Timing/Onset, Context/Setting, Quality, Duration, Modifying Factors, Severity, Associated Signs and Symptoms)  Note limiting factors. Kay Oneil is a 38 y.o. female with medical history of CVA, HTN, MI, pilonidal cyst, vertigo, sickle cell trait who presents the ED with complaints of headache, myalgias, bruise to her left arm concern for Covid x 3 days. Patient said that she works at [employer] and 3 of her coworkers were out sick with Covid. She does note that one of them had returned to work while still experiencing symptoms of Covid. She said that she feels very dehydrated and has a decreased sense of taste that she knows whenever smoking a cigarette. She denies any associated fevers, visual disturbance, neck pain, back pain, numbness, tingling, weakness, nausea, vomiting, diarrhea, chest pain, abdominal pain, leg swelling, lightheaded, dizzy, or syncope. Nursing Notes were all reviewed and agreed with or any disagreements were addressed in the HPI. REVIEW OF SYSTEMS    (2-9 systems for level 4, 10 or more for level 5)     Review of Systems    Positives and Pertinent negatives as per HPI. Except as noted above in the ROS, all other systems were reviewed and negative.        PAST MEDICAL HISTORY     Past Medical History:   Diagnosis Date    [facility]erebral artery occlusion [facility] cerebral infarction [facility] [address]     Hypertension     MI, old    Blaise Moantony Pilonidal cyst     Sick[facility] cell trait ([facility] [address]     Vertigo          SURGICAL HISTORY     Past Surgical History:   Procedure Laterality Date    PILONIDAL CYST EXCISION           CURRENTMEDICATIONS       Previous Medications    AMLODIPINE (NORVASC) 10 MG TABLET    Take 10 mg by mouth daily    CYCLOBENZAPRINE (FLEXERIL) 10 MG TABLET    Take 10 mg by mouth nightly as needed for Muscle spasms    DOXYCYCLINE HYCLATE (VIBRAMYCIN) 100 MG CAPSULE    Take 100 mg by mouth 2 times daily    IBUPROFEN (ADVIL;MOTRIN) 800 MG TABLET    Take 800 mg by mouth every 8 hours as needed for Pain    METRONIDAZOLE (FLAGYL) 500 MG TABLET    Take 500 mg by mouth 2 times daily    SULFAMETHOXAZOLE-TRIMETHOPRIM (BACTRIM DS;SEPTRA DS) 800-160 MG PER TABLET    Take 1 tablet by mouth 2 times daily         ALLERGIES     Other    FAMILYHISTORY     History reviewed. No pertinent family history. SOCIAL HISTORY       Social History     Tobacco Use    Smoking status: Current Every Day Smoker     Packs/day: 1.00     Types: Cigars    Smokeless tobacco: Never Used   Substance Use Topics    Alcohol use: No     Comment: occ    Drug use: Not on file     Comment: occ       SCREENINGS             PHYSICAL EXAM    (up to 7 for level 4, 8 or more for level 5)     ED Triage Vitals [01/21/21 1332]   BP Temp Temp Source Pulse Resp SpO2 Height Weight   (!) 142/100 98.6 °F (37 °C) Oral 92 14 96 % 5' 5\" (1.651 m) 215 lb 2.7 oz (97.6 kg)       Physical Exam  Vitals signs and nursing note reviewed. Constitutional:       General: She is awake. Appearance: Normal appearance. She is well-developed. She is obese. HENT:      Head: Normocephalic and atraumatic. Nose: Nose normal.   Eyes:      General:         Right eye: No discharge. Left eye: No discharge. Neck:      Musculoskeletal: Normal range of motion. Cardiovascular:      Rate and Rhythm: Normal rate and regular rhythm. Heart sounds: Normal heart sounds.    Pulmonary:      Effort: following medications:  Medications   guaiFENesin-dextromethorphan (ROBITUSSIN DM) 100-10 MG/5ML syrup 5 mL (has no administration in time range)           Covid Decompensation Risk Scale    Clinical Risk Score:                                                                                                                       -history of CHF, COPD, age >57 = +2 pts each                                                                 -CXR:    Moderate, non-lobar = +2   1 lobar infiltrate = +4   Severe bilateral OR 2+ lobar infiltrates = +8    - HR >100 at disposition: +2 pts    -SpO2 < 92% on RA = +4 pts    -RR> 20 = 1 pt each    Total __0_    Score ? 8: Low Risk   0-2 = home with routine follow up   3-4 = home with SpO2 monitor or 24h f/u  *consider ASA 81mg PO daily for 2 weeks if no contraindication or allergy      Pertinent Labs & Imaging studies reviewed. (See chart for details)   -  Patient seen and evaluated in the emergency department. -  Triage and nursing notes reviewed and incorporated. -  Old chart records reviewed and incorporated. -  Patient case was not discussed with attending physician, although Dr. Adelia Wells was available for consultation  -  Differential diagnosis includes:  influenza, sinusitis, bronchitis, UTI, dehydration, viral illness, pneumonia, versus COVID-19  -  Work-up included:  See above CXR, CBC, COVID-19  -  ED treatment included:  robitussin  - Consults: None  -  Results discussed with patient. Labs show  CXR shows no evidence of acute cardiopulmonary disease. CBC with RDW 15.9. Pt was given strict return precautions. The patient is agreeable with plan of care and disposition.  -  Disposition:  Home      FINAL IMPRESSION      1. Cough    2. Suspected COVID-19 virus infection    3.  Myalgia          DISPOSITION/PLAN   DISPOSITION Decision To Discharge 01/21/2021 04:26:08 PM      PATIENT REFERREDTO:  Marko Kenjinathaniel [address] [place] [address]  [phone]    Call in 2 days  As needed, If symptoms worsen    [facility]  [address] [address]  [phone]  Go to   As needed      DISCHARGE MEDICATIONS:  New Prescriptions    ACETAMINOPHEN (TYLENOL) 500 MG TABLET    Take 1 tablet by mouth 4 times daily as needed for Pain    GUAIFENESIN-DEXTROMETHORPHAN (ROBITUSSIN DM) 100-10 MG/5ML SYRUP    Take 5 mLs by mouth 3 times daily as needed for Cough    ONDANSETRON (ZOFRAN) 4 MG TABLET    Take 1 tablet by mouth daily as needed for Nausea or Vomiting       DISCONTINUED MEDICATIONS:  Discontinued Medications    No medications on file              (Please note that portions of this note were completed with a voice recognition program.  Efforts were made to edit the dictations but occasionally words are mis-transcribed.)    NAKUL Howe CNP (electronically signed)            NAKUL Howe CNP  01/21/21 7644

## 2021-01-21 NOTE — ED NOTES
Pt ambulated with pulse ox, steady gait and pt O2 remained at 98%, pt had no complaints.       Dandy Bradshaw RN  01/21/21 4327

## 2021-01-22 ENCOUNTER — CARE COORDINATION (OUTPATIENT)
Dept: CARE COORDINATION | Age: 41
End: 2021-01-22

## 2021-01-22 NOTE — CARE COORDINATION
Care Transition phone outreach s/p acute care visit 1.21.21  Left HIPAA compliant vm requesting return callback. Provided & repeated direct contact number to reach this nurse.

## 2021-01-22 NOTE — CARE COORDINATION
Patient contacted regarding COVID-19 exposure. Discussed COVID-19 related testing which was available at this time. Test results were negative. Patient informed of results, if available? Yes    Care Transition Nurse/ Ambulatory Care Manager contacted the patient by telephone to perform post discharge assessment. Call within 2 business days of discharge: Yes. Verified name and  with patient as identifiers. Provided introduction to self, and explanation of the CTN/ACM role, and reason for call due to risk factors for infection and/or exposure to COVID-19. Symptoms reviewed with patient who verbalized the following symptoms: no new symptoms and no worsening symptoms. Due to no new or worsening symptoms encounter was not routed to provider for escalation. Discussed follow-up appointments. If no appointment was previously scheduled, appointment scheduling offered: Yes  [facility] follow up appointment(s): No future appointments. Non-[facility] follow up appointment(s):     Non-face-to-face services provided:  Obtained and reviewed discharge summary and/or continuity of care documents  O2 sats 98% per Pulse Oximeter. Denies questions or concerns r/t review of instructions for use/self monitoring per Pulse Oximeter device. Advance Care Planning:   Does patient have an Advance Directive:  not on file; education provided. Patient has following risk factors of: recently recovered from Covid. CTN/ACM reviewed discharge instructions, medical action plan and red flags such as increased shortness of breath, increasing fever and signs of decompensation with patient who verbalized understanding. Discussed exposure protocols and quarantine with CDC Guidelines What to do if you are sick with coronavirus disease .  Patient was given an opportunity for questions and concerns.  The patient agrees to contact the [facility] exposure line [phone], [facility] department [place] Department of [facility]: ([phone]) and PCP

## 2021-04-28 ENCOUNTER — HOSPITAL ENCOUNTER (EMERGENCY)
Age: 41
Discharge: HOME | End: 2021-04-29
Attending: EMERGENCY MEDICINE
Payer: COMMERCIAL

## 2021-04-28 ENCOUNTER — APPOINTMENT (OUTPATIENT)
Dept: GENERAL RADIOLOGY | Age: 41
End: 2021-04-28
Payer: COMMERCIAL

## 2021-04-28 DIAGNOSIS — R40.20 LOSS OF CONSCIOUSNESS (HCC): Primary | ICD-10-CM

## 2021-04-28 LAB
ACETAMINOPHEN LEVEL: <5 UG/ML (ref 10–30)
ALBUMIN SERPL-MCNC: 4.4 G/DL (ref 3.4–5)
ALP BLD-CCNC: 71 U/L (ref 40–129)
ALT SERPL-CCNC: 9 U/L (ref 10–40)
ANION GAP SERPL CALCULATED.3IONS-SCNC: 15 MMOL/L (ref 3–16)
AST SERPL-CCNC: 15 U/L (ref 15–37)
BASOPHILS ABSOLUTE: 0.1 K/UL (ref 0–0.2)
BASOPHILS RELATIVE PERCENT: 1.2 %
BILIRUB SERPL-MCNC: 0.6 MG/DL (ref 0–1)
BILIRUBIN DIRECT: <0.2 MG/DL (ref 0–0.3)
BILIRUBIN, INDIRECT: ABNORMAL MG/DL (ref 0–1)
BUN BLDV-MCNC: 14 MG/DL (ref 7–20)
CALCIUM SERPL-MCNC: 8.9 MG/DL (ref 8.3–10.6)
CHLORIDE BLD-SCNC: 103 MMOL/L (ref 99–110)
CO2: 21 MMOL/L (ref 21–32)
CREAT SERPL-MCNC: 0.8 MG/DL (ref 0.6–1.1)
EOSINOPHILS ABSOLUTE: 0.3 K/UL (ref 0–0.6)
EOSINOPHILS RELATIVE PERCENT: 7.1 %
ETHANOL: NORMAL MG/DL (ref 0–0.08)
GFR AFRICAN AMERICAN: >60
GFR NON-AFRICAN AMERICAN: >60
GLUCOSE BLD-MCNC: 102 MG/DL (ref 70–99)
HCT VFR BLD CALC: 36.9 % (ref 36–48)
HEMOGLOBIN: 12.3 G/DL (ref 12–16)
LACTIC ACID: 2.9 MMOL/L (ref 0.4–2)
LIPASE: 29 U/L (ref 13–60)
LYMPHOCYTES ABSOLUTE: 1.5 K/UL (ref 1–5.1)
LYMPHOCYTES RELATIVE PERCENT: 32.3 %
MCH RBC QN AUTO: 28.5 PG (ref 26–34)
MCHC RBC AUTO-ENTMCNC: 33.3 G/DL (ref 31–36)
MCV RBC AUTO: 85.4 FL (ref 80–100)
MONOCYTES ABSOLUTE: 0.5 K/UL (ref 0–1.3)
MONOCYTES RELATIVE PERCENT: 9.5 %
NEUTROPHILS ABSOLUTE: 2.4 K/UL (ref 1.7–7.7)
NEUTROPHILS RELATIVE PERCENT: 49.9 %
PDW BLD-RTO: 15.4 % (ref 12.4–15.4)
PLATELET # BLD: 273 K/UL (ref 135–450)
PMV BLD AUTO: 8.4 FL (ref 5–10.5)
POTASSIUM REFLEX MAGNESIUM: 4.1 MMOL/L (ref 3.5–5.1)
PRO-BNP: 26 PG/ML (ref 0–124)
RBC # BLD: 4.32 M/UL (ref 4–5.2)
SALICYLATE, SERUM: <0.3 MG/DL (ref 15–30)
SODIUM BLD-SCNC: 139 MMOL/L (ref 136–145)
TOTAL CK: 98 U/L (ref 26–192)
TOTAL PROTEIN: 7 G/DL (ref 6.4–8.2)
TROPONIN: <0.01 NG/ML
WBC # BLD: 4.8 K/UL (ref 4–11)

## 2021-04-28 PROCEDURE — 81003 URINALYSIS AUTO W/O SCOPE: CPT

## 2021-04-28 PROCEDURE — 71045 X-RAY EXAM CHEST 1 VIEW: CPT

## 2021-04-28 PROCEDURE — 93005 ELECTROCARDIOGRAM TRACING: CPT | Performed by: EMERGENCY MEDICINE

## 2021-04-28 PROCEDURE — 80307 DRUG TEST PRSMV CHEM ANLYZR: CPT

## 2021-04-28 PROCEDURE — 83880 ASSAY OF NATRIURETIC PEPTIDE: CPT

## 2021-04-28 PROCEDURE — 84703 CHORIONIC GONADOTROPIN ASSAY: CPT

## 2021-04-28 PROCEDURE — 85025 COMPLETE CBC W/AUTO DIFF WBC: CPT

## 2021-04-28 PROCEDURE — 83690 ASSAY OF LIPASE: CPT

## 2021-04-28 PROCEDURE — 82077 ASSAY SPEC XCP UR&BREATH IA: CPT

## 2021-04-28 PROCEDURE — 80048 BASIC METABOLIC PNL TOTAL CA: CPT

## 2021-04-28 PROCEDURE — 82550 ASSAY OF CK (CPK): CPT

## 2021-04-28 PROCEDURE — 83605 ASSAY OF LACTIC ACID: CPT

## 2021-04-28 PROCEDURE — 84484 ASSAY OF TROPONIN QUANT: CPT

## 2021-04-28 PROCEDURE — 99284 EMERGENCY DEPT VISIT MOD MDM: CPT

## 2021-04-28 PROCEDURE — 80179 DRUG ASSAY SALICYLATE: CPT

## 2021-04-28 PROCEDURE — 80143 DRUG ASSAY ACETAMINOPHEN: CPT

## 2021-04-28 PROCEDURE — 80076 HEPATIC FUNCTION PANEL: CPT

## 2021-04-29 ENCOUNTER — APPOINTMENT (OUTPATIENT)
Dept: CT IMAGING | Age: 41
End: 2021-04-29
Payer: COMMERCIAL

## 2021-04-29 ENCOUNTER — HOSPITAL ENCOUNTER (EMERGENCY)
Age: 41
Discharge: HOME | End: 2021-04-29
Payer: COMMERCIAL

## 2021-04-29 VITALS
HEIGHT: 65 IN | HEART RATE: 75 BPM | RESPIRATION RATE: 14 BRPM | OXYGEN SATURATION: 100 % | WEIGHT: 212.52 LBS | BODY MASS INDEX: 35.41 KG/M2 | TEMPERATURE: 98.7 F | SYSTOLIC BLOOD PRESSURE: 143 MMHG | DIASTOLIC BLOOD PRESSURE: 94 MMHG

## 2021-04-29 VITALS
OXYGEN SATURATION: 100 % | BODY MASS INDEX: 36.07 KG/M2 | DIASTOLIC BLOOD PRESSURE: 72 MMHG | HEIGHT: 65 IN | RESPIRATION RATE: 13 BRPM | WEIGHT: 216.49 LBS | SYSTOLIC BLOOD PRESSURE: 130 MMHG | HEART RATE: 69 BPM | TEMPERATURE: 98.1 F

## 2021-04-29 DIAGNOSIS — S05.12XA CONTUSION OF GLOBE OF LEFT EYE, INITIAL ENCOUNTER: ICD-10-CM

## 2021-04-29 DIAGNOSIS — F07.81 CONCUSSION SYNDROME: ICD-10-CM

## 2021-04-29 DIAGNOSIS — R51.9 NONINTRACTABLE HEADACHE, UNSPECIFIED CHRONICITY PATTERN, UNSPECIFIED HEADACHE TYPE: Primary | ICD-10-CM

## 2021-04-29 DIAGNOSIS — S09.90XD INJURY OF HEAD, SUBSEQUENT ENCOUNTER: ICD-10-CM

## 2021-04-29 LAB
ALBUMIN SERPL-MCNC: 4.6 G/DL (ref 3.4–5)
ALP BLD-CCNC: 77 U/L (ref 40–129)
ALT SERPL-CCNC: 9 U/L (ref 10–40)
AMPHETAMINE SCREEN, URINE: ABNORMAL
ANION GAP SERPL CALCULATED.3IONS-SCNC: 12 MMOL/L (ref 3–16)
APTT: 34 SEC (ref 24.2–36.2)
AST SERPL-CCNC: 13 U/L (ref 15–37)
BARBITURATE SCREEN URINE: ABNORMAL
BASOPHILS ABSOLUTE: 0.1 K/UL (ref 0–0.2)
BASOPHILS RELATIVE PERCENT: 1.1 %
BENZODIAZEPINE SCREEN, URINE: ABNORMAL
BILIRUB SERPL-MCNC: 0.9 MG/DL (ref 0–1)
BILIRUBIN DIRECT: <0.2 MG/DL (ref 0–0.3)
BILIRUBIN URINE: NEGATIVE
BILIRUBIN, INDIRECT: ABNORMAL MG/DL (ref 0–1)
BLOOD, URINE: NEGATIVE
BUN BLDV-MCNC: 11 MG/DL (ref 7–20)
CALCIUM SERPL-MCNC: 9.5 MG/DL (ref 8.3–10.6)
CANNABINOID SCREEN URINE: POSITIVE
CHLORIDE BLD-SCNC: 103 MMOL/L (ref 99–110)
CLARITY: CLEAR
CO2: 23 MMOL/L (ref 21–32)
COCAINE METABOLITE SCREEN URINE: ABNORMAL
COLOR: YELLOW
CREAT SERPL-MCNC: 0.6 MG/DL (ref 0.6–1.1)
EKG ATRIAL RATE: 63 BPM
EKG ATRIAL RATE: 80 BPM
EKG DIAGNOSIS: NORMAL
EKG DIAGNOSIS: NORMAL
EKG P AXIS: 64 DEGREES
EKG P-R INTERVAL: 126 MS
EKG P-R INTERVAL: 138 MS
EKG Q-T INTERVAL: 358 MS
EKG Q-T INTERVAL: 390 MS
EKG QRS DURATION: 74 MS
EKG QRS DURATION: 76 MS
EKG QTC CALCULATION (BAZETT): 399 MS
EKG QTC CALCULATION (BAZETT): 412 MS
EKG R AXIS: -17 DEGREES
EKG R AXIS: 35 DEGREES
EKG T AXIS: 58 DEGREES
EKG T AXIS: 63 DEGREES
EKG VENTRICULAR RATE: 63 BPM
EKG VENTRICULAR RATE: 80 BPM
EOSINOPHILS ABSOLUTE: 0 K/UL (ref 0–0.6)
EOSINOPHILS RELATIVE PERCENT: 0.2 %
GFR AFRICAN AMERICAN: >60
GFR NON-AFRICAN AMERICAN: >60
GLUCOSE BLD-MCNC: 81 MG/DL (ref 70–99)
GLUCOSE URINE: NEGATIVE MG/DL
HCG QUALITATIVE: NEGATIVE
HCT VFR BLD CALC: 36.7 % (ref 36–48)
HEMOGLOBIN: 12.3 G/DL (ref 12–16)
INR BLD: 1.08 (ref 0.86–1.14)
KETONES, URINE: NEGATIVE MG/DL
LACTIC ACID: 0.7 MMOL/L (ref 0.4–2)
LEUKOCYTE ESTERASE, URINE: NEGATIVE
LYMPHOCYTES ABSOLUTE: 1.1 K/UL (ref 1–5.1)
LYMPHOCYTES RELATIVE PERCENT: 15.4 %
Lab: ABNORMAL
MCH RBC QN AUTO: 28.7 PG (ref 26–34)
MCHC RBC AUTO-ENTMCNC: 33.6 G/DL (ref 31–36)
MCV RBC AUTO: 85.4 FL (ref 80–100)
METHADONE SCREEN, URINE: ABNORMAL
MICROSCOPIC EXAMINATION: NORMAL
MONOCYTES ABSOLUTE: 0.5 K/UL (ref 0–1.3)
MONOCYTES RELATIVE PERCENT: 6.9 %
NEUTROPHILS ABSOLUTE: 5.2 K/UL (ref 1.7–7.7)
NEUTROPHILS RELATIVE PERCENT: 76.4 %
NITRITE, URINE: NEGATIVE
OPIATE SCREEN URINE: ABNORMAL
OXYCODONE URINE: ABNORMAL
PDW BLD-RTO: 15.5 % (ref 12.4–15.4)
PH UA: 5
PH UA: 6.5 (ref 5–8)
PHENCYCLIDINE SCREEN URINE: ABNORMAL
PLATELET # BLD: 273 K/UL (ref 135–450)
PMV BLD AUTO: 8 FL (ref 5–10.5)
POTASSIUM SERPL-SCNC: 3.8 MMOL/L (ref 3.5–5.1)
PROCALCITONIN: 0.02 NG/ML (ref 0–0.15)
PROPOXYPHENE SCREEN: ABNORMAL
PROTEIN UA: NEGATIVE MG/DL
PROTHROMBIN TIME: 12.5 SEC (ref 10–13.2)
RBC # BLD: 4.3 M/UL (ref 4–5.2)
SODIUM BLD-SCNC: 138 MMOL/L (ref 136–145)
SPECIFIC GRAVITY UA: >1.03 (ref 1–1.03)
TOTAL CK: 200 U/L (ref 26–192)
TOTAL PROTEIN: 7.6 G/DL (ref 6.4–8.2)
URINE REFLEX TO CULTURE: NORMAL
URINE TYPE: NORMAL
UROBILINOGEN, URINE: 0.2 E.U./DL
WBC # BLD: 6.9 K/UL (ref 4–11)

## 2021-04-29 PROCEDURE — 83605 ASSAY OF LACTIC ACID: CPT

## 2021-04-29 PROCEDURE — 85730 THROMBOPLASTIN TIME PARTIAL: CPT

## 2021-04-29 PROCEDURE — 93010 ELECTROCARDIOGRAM REPORT: CPT | Performed by: INTERNAL MEDICINE

## 2021-04-29 PROCEDURE — 80048 BASIC METABOLIC PNL TOTAL CA: CPT

## 2021-04-29 PROCEDURE — 71260 CT THORAX DX C+: CPT

## 2021-04-29 PROCEDURE — 6360000004 HC RX CONTRAST MEDICATION: Performed by: EMERGENCY MEDICINE

## 2021-04-29 PROCEDURE — 6360000002 HC RX W HCPCS: Performed by: PHYSICIAN ASSISTANT

## 2021-04-29 PROCEDURE — 6370000000 HC RX 637 (ALT 250 FOR IP): Performed by: PHYSICIAN ASSISTANT

## 2021-04-29 PROCEDURE — 96372 THER/PROPH/DIAG INJ SC/IM: CPT

## 2021-04-29 PROCEDURE — 2580000003 HC RX 258: Performed by: EMERGENCY MEDICINE

## 2021-04-29 PROCEDURE — 84145 PROCALCITONIN (PCT): CPT

## 2021-04-29 PROCEDURE — 99285 EMERGENCY DEPT VISIT HI MDM: CPT

## 2021-04-29 PROCEDURE — 85025 COMPLETE CBC W/AUTO DIFF WBC: CPT

## 2021-04-29 PROCEDURE — 93005 ELECTROCARDIOGRAM TRACING: CPT | Performed by: PHYSICIAN ASSISTANT

## 2021-04-29 PROCEDURE — 80076 HEPATIC FUNCTION PANEL: CPT

## 2021-04-29 PROCEDURE — 82550 ASSAY OF CK (CPK): CPT

## 2021-04-29 PROCEDURE — 70450 CT HEAD/BRAIN W/O DYE: CPT

## 2021-04-29 PROCEDURE — 70486 CT MAXILLOFACIAL W/O DYE: CPT

## 2021-04-29 PROCEDURE — 85610 PROTHROMBIN TIME: CPT

## 2021-04-29 RX ORDER — 0.9 % SODIUM CHLORIDE 0.9 %
1000 INTRAVENOUS SOLUTION INTRAVENOUS ONCE
Status: COMPLETED | OUTPATIENT
Start: 2021-04-29 | End: 2021-04-29

## 2021-04-29 RX ORDER — IBUPROFEN 600 MG/1
600 TABLET ORAL
Qty: 21 TABLET | Refills: 0 | Status: SHIPPED | OUTPATIENT
Start: 2021-04-29 | End: 2021-06-10

## 2021-04-29 RX ORDER — KETOROLAC TROMETHAMINE 30 MG/ML
30 INJECTION, SOLUTION INTRAMUSCULAR; INTRAVENOUS ONCE
Status: COMPLETED | OUTPATIENT
Start: 2021-04-29 | End: 2021-04-29

## 2021-04-29 RX ORDER — TETRACAINE HYDROCHLORIDE 5 MG/ML
1 SOLUTION OPHTHALMIC ONCE
Status: COMPLETED | OUTPATIENT
Start: 2021-04-29 | End: 2021-04-29

## 2021-04-29 RX ORDER — BUTALBITAL, ACETAMINOPHEN AND CAFFEINE 50; 325; 40 MG/1; MG/1; MG/1
1 TABLET ORAL
Qty: 8 TABLET | Refills: 3 | Status: SHIPPED | OUTPATIENT
Start: 2021-04-29 | End: 2021-06-25 | Stop reason: ALTCHOICE

## 2021-04-29 RX ORDER — ACETAMINOPHEN 500 MG
1000 TABLET ORAL
Qty: 42 TABLET | Refills: 0 | Status: SHIPPED | OUTPATIENT
Start: 2021-04-29 | End: 2021-09-07

## 2021-04-29 RX ORDER — 0.9 % SODIUM CHLORIDE 0.9 %
1000 INTRAVENOUS SOLUTION INTRAVENOUS ONCE
Status: DISCONTINUED | OUTPATIENT
Start: 2021-04-29 | End: 2021-04-29

## 2021-04-29 RX ORDER — KETOROLAC TROMETHAMINE 30 MG/ML
30 INJECTION, SOLUTION INTRAMUSCULAR; INTRAVENOUS ONCE
Status: DISCONTINUED | OUTPATIENT
Start: 2021-04-29 | End: 2021-04-29

## 2021-04-29 RX ADMIN — FLUORESCEIN SODIUM 1 MG: 1 STRIP OPHTHALMIC at 12:59

## 2021-04-29 RX ADMIN — IOPAMIDOL 75 ML: 755 INJECTION, SOLUTION INTRAVENOUS at 01:15

## 2021-04-29 RX ADMIN — KETOROLAC TROMETHAMINE 30 MG: 30 INJECTION, SOLUTION INTRAMUSCULAR; INTRAVENOUS at 12:56

## 2021-04-29 RX ADMIN — TETRACAINE HYDROCHLORIDE 1 DROP: 5 SOLUTION OPHTHALMIC at 12:59

## 2021-04-29 RX ADMIN — SODIUM CHLORIDE 1000 ML: 9 INJECTION, SOLUTION INTRAVENOUS at 02:08

## 2021-04-29 ASSESSMENT — PAIN DESCRIPTION - LOCATION: LOCATION: HEAD

## 2021-04-29 ASSESSMENT — ENCOUNTER SYMPTOMS
COLOR CHANGE: 0
SHORTNESS OF BREATH: 0
VOMITING: 0
ABDOMINAL PAIN: 0
CONSTIPATION: 0
EYE DISCHARGE: 0
COUGH: 0
EYE ITCHING: 0

## 2021-04-29 ASSESSMENT — VISUAL ACUITY
OU: 20/30
OS: 20/30

## 2021-04-29 ASSESSMENT — PAIN SCALES - GENERAL: PAINLEVEL_OUTOF10: 10

## 2021-04-29 NOTE — ED PROVIDER NOTES
[address] [facility]      Pt Name: Hadley Pritchard  MRN: [id number]  [place] 1980  Date of evaluation: 4/28/2021  Provider: Ronald Momin MD    CHIEF COMPLAINT       Chief Complaint   Patient presents with    Altered Mental Status     HISTORY OF PRESENT ILLNESS    Hadley Pritchard is a 38 y.o. female who presents to the emergency department with possible loss of consciousness. Per daughter patient was passed out on the ground minimally responsive. EMS was called. On arrival patient woke to sternal rub by EMS. No tonic-clonic movements noted. This has never happened before per patient. Patient states she had just fallen asleep (voluntarily) and thinks she was just difficult to awake (does marijuana). Denies any symptoms currently. No other associated symptoms. Nursing Notes were reviewed. Including nursing noted for FM, Surgical History, Past Medical History, Social History, vitals, and allergies; agree with all. REVIEW OF SYSTEMS       Review of Systems   Constitutional: Negative for diaphoresis and unexpected weight change. HENT: Negative for congestion and dental problem. Eyes: Negative for discharge and itching. Respiratory: Negative for cough and shortness of breath. Cardiovascular: Negative for chest pain and leg swelling. Gastrointestinal: Negative for abdominal pain, constipation and vomiting. Endocrine: Negative for cold intolerance and heat intolerance. Genitourinary: Negative for vaginal bleeding, vaginal discharge and vaginal pain. Musculoskeletal: Negative for neck pain and neck stiffness. Skin: Negative for color change and pallor. Neurological: Positive for syncope. Negative for tremors and weakness. Psychiatric/Behavioral: Negative for agitation. Except as noted above the remainder of the review of systems was reviewed and negative.      PAST MEDICAL HISTORY     Past Medical History: Diagnosis Date    Cerebral artery occlusion with cerebral infarction ([facility] [address]     Hypertension     MI, old     Pilonidal cyst     Sickle cell trait ([facility] [address] [facility]     Vertigo        SURGICAL HISTORY       Past Surgical History:   Procedure Laterality Date    PILONIDAL CYST EXCISION         CURRENT MEDICATIONS       Previous Medications    ACETAMINOPHEN (TYLENOL) 500 MG TABLET    Take 1 tablet by mouth 4 times daily as needed for Pain    AMLODIPINE (NORVASC) 10 MG TABLET    Take 10 mg by mouth daily    CYCLOBENZAPRINE (FLEXERIL) 10 MG TABLET    Take 10 mg by mouth nightly as needed for Muscle spasms    DOXYCYCLINE HYCLATE (VIBRAMYCIN) 100 MG CAPSULE    Take 100 mg by mouth 2 times daily    GUAIFENESIN-DEXTROMETHORPHAN (ROBITUSSIN DM) 100-10 MG/5ML SYRUP    Take 5 mLs by mouth 3 times daily as needed for Cough    IBUPROFEN (ADVIL;MOTRIN) 800 MG TABLET    Take 800 mg by mouth every 8 hours as needed for Pain    METRONIDAZOLE (FLAGYL) 500 MG TABLET    Take 500 mg by mouth 2 times daily    ONDANSETRON (ZOFRAN) 4 MG TABLET    Take 1 tablet by mouth daily as needed for Nausea or Vomiting    SULFAMETHOXAZOLE-TRIMETHOPRIM (BACTRIM DS;SEPTRA DS) 800-160 MG PER TABLET    Take 1 tablet by mouth 2 times daily       ALLERGIES     Other    FAMILY HISTORY      History reviewed. No pertinent family history.     SOCIAL HISTORY       Social History     Socioeconomic History    Marital status: Single     Spouse name: None    Number of children: None    Years of education: None    Highest education level: None   Occupational History    None   Social Needs    Financial resource strain: None    Food insecurity     Worry: None     Inability: None    Transportation needs     Medical: None     Non-medical: None   Tobacco Use    Smoking status: Current Every Day Smoker     Packs/day: 1.00     Types: Cigars    Smokeless tobacco: Never Used   Substance and Sexual Activity    Alcohol use: No     Comment: occ    Drug use: Yes     Types: abdominal tenderness. There is no guarding or rebound. Genitourinary:     Comments: Deferred  Musculoskeletal: Normal range of motion. General: No deformity. Skin:     General: Skin is warm. Findings: No erythema or rash. Neurological:      Mental Status: She is alert and oriented to person, place, and time. She is not disoriented. Cranial Nerves: No cranial nerve deficit. Motor: No atrophy or abnormal muscle tone. Coordination: Coordination normal.   Psychiatric:         Behavior: Behavior normal.         Thought Content:  Thought content normal.         DIAGNOSTIC RESULTS     EKG: All EKG's are interpreted by the Emergency Department Physician who either signs or Co-signs this chart in the absence of acardiologist.    EKG normal sinus rhythm nonspecific EKG changes no ectopy    RADIOLOGY:   Non-plain film images such as CT, Ultrasoundand MRI are read by the radiologist. Plain radiographic images are visualized and preliminarily interpreted by the emergency physician with the below findings:    Imaging reassuring    ED BEDSIDE ULTRASOUND:   Performed by ED Physician - none    LABS:  Labs Reviewed   BASIC METABOLIC PANEL W/ REFLEX TO MG FOR LOW K - Abnormal; Notable for the following components:       Result Value    Glucose 102 (*)     All other components within normal limits    Narrative:     Performed at:  [facility]  [address] [facility] [address]   Phone [phone]   HEPATIC FUNCTION PANEL - Abnormal; Notable for the following components:    ALT 9 (*)     All other components within normal limits    Narrative:     Performed at:  [facility]  [address] [facility] [address]   Phone [phone]   LACTIC ACID, PLASMA - Abnormal; Notable for the following components:    Lactic Acid 2.9 (*)     All other components within normal limits    Narrative:     Performed at:  [facility]  [address], [place] [address]   Phone [phone]   URINE DRUG SCREEN - Abnormal; Notable for the following components:    Cannabinoid Scrn, Ur POSITIVE (*)     All other components within normal limits    Narrative:     Performed at:  [facility]  [address], [place] [address]   Phone [phone]   SALICYLATE LEVEL - Abnormal; Notable for the following components:    Salicylate, Serum <2.8 (*)     All other components within normal limits    Narrative:     Performed at:  [facility]  [address], [place] [address]   Phone [phone]   ACETAMINOPHEN LEVEL - Abnormal; Notable for the following components:    Acetaminophen Level <5 (*)     All other components within normal limits    Narrative:     Performed at:  [facility]  [address], [place] [address]   Phone [phone]   CBC WITH AUTO DIFFERENTIAL    Narrative:     Performed at:  [facility]  [address] [place] [address]   Phone [phone]   TROPONIN    Narrative:     Performed at:  [facility]  [address] [place] [address]   Phone [phone]   BRAIN NATRIURETIC PEPTIDE    Narrative:     Performed at:  [facility]  [address] [place] [address]   Phone [phone]   LIPASE    Narrative:     Performed at:  [facility]  [address] [place] [address]   Phone [phone]   URINE RT REFLEX TO CULTURE    Narrative:     Performed at:  [facility]  [address], [place] [address]   Phone [phone]   CK    Narrative:     Performed at:  [facility]  [address] [place] [address]   Phone [phone] HCG, SERUM, QUALITATIVE    Narrative:     Performed at:  [facility]  [address] [facility] [address]   Phone [phone]   ETHANOL    Narrative:     Performed at:  [facility]  [address] [facility] [address]   Phone [phone]       All other labs were withinnormal range or not returned as of this dictation. EMERGENCY DEPARTMENT COURSE and DIFFERENTIAL DIAGNOSIS/MDM:     PMH, Surgical Hx, FH, Social Hx reviewed by myself (ETOH usage, Tobacco usage, Drug usage reviewed by myself, no pertinent Hx)- No Pertinent Hx     Old records were reviewed by me     Reassuring work-up. My guess is patient was just sleeping and difficult to awaken. She has no complaints. She wants to go home. She ambulates with steady gait. She is alert and oriented without any evidence of confusion. She has no complaints clinically well-appearing nontoxic. I estimate there is LOW risk for Sepsis, MI, Stroke, Tamponade, PTX, Toxicity or other life threatening etiology thus I consider the discharge disposition reasonable. The patient is at low risk for mortality based on demographic, history and clinical factors. Given the best available information and clinical assessment, I estimate the risk of hospitalization to be greater than risk of treatment at home. I have explained to the patient that the risk could rapidly change, given precautions for return and instructions. Explained to patient that the risk for mortality is low based on demographic, history and clinical factors. I discussed with patient the results of evaluation in the ED, diagnosis, care, and prognosis. The plan is to discharge to home. Patient is in agreement with plan and questions have been answered. I also discussed with patient the reasons which may require a return visit and the importance of follow-up care.   The patient is well-appearing, nontoxic, and improved at the time of discharge. Patient agrees to call to arrange follow-up care as directed. Patient understands to return immediately for worsening/change in symptoms. CRITICAL CARE TIME   Total Critical Caretime was 21 minutes, excluding separately reportable procedures. There was a high probability of clinically significant/life threatening deterioration in the patient's condition which required my urgent intervention. PROCEDURES:  Unlessotherwise noted below, none    FINAL IMPRESSION      1.  Loss of consciousness [facility])          DISPOSITION/PLAN   DISPOSITION Decision To Discharge 04/29/2021 03:21:45 AM    PATIENT REFERRED TO:  Marko Ojeda 54  [address]  [phone]    Call today        DISCHARGE MEDICATIONS:  New Prescriptions    No medications on file          (Please note that portions ofthis note were completed with a voice recognition program.  Efforts were made to edit the dictations but occasionally words are mis-transcribed.)    Trinidad Quinonez MD(electronically signed)  Attending Emergency Physician        Trinidad Quinonez MD  04/29/21 5744

## 2021-04-29 NOTE — ED NOTES
Pt up walking back and forth to the bathroom without gait disturbance noted. I called pts daughter and she stated she would come and pick her up.      Cheikh Mccarthy RN  04/29/21 7992

## 2021-04-29 NOTE — ED NOTES
Pt ambulate to the restroom and from the restroom even and steady. Pt is back in bed. Call light at bedside. Notify nurse renee.       Maday Zurita  04/29/21 0136

## 2021-04-29 NOTE — ED NOTES
Pt presents to ED per EMS with c/o dizziness and generalized fatigue after fall yesterday. Pt was seen in this ER last night and discharged. Pt is alert and oriented and answering all questions appropriately. VSS on the monitor. Left sclera red, pt states happened after fall last night. Pt c/o 10/10 pain on the left anterior side of head from after fall. Pt denies blood thinners.  FSBS stable en route      Justyna Burnett RN  04/29/21 1294

## 2021-04-29 NOTE — ED PROVIDER NOTES
[address] [facility]        Pt Name: Chaim Yousif  MRN: [id number]  [place] 1980  Date of evaluation: 4/29/2021  Provider: Michael Malhotra  PCP: [facility]    RAKEL. I have evaluated this patient. My supervising physician was available for consultation. Erin Infante      CHIEF COMPLAINT       Chief Complaint   Patient presents with    Altered Mental Status     fall last night and was seen in the ED, still does not feel right       HISTORY OF PRESENT ILLNESS   (Location, Timing/Onset, Context/Setting, Quality, Duration, Modifying Factors, Severity, Associated Signs and Symptoms)  Note limiting factors. Chaim Yousif is a 38 y.o. female patient returning to ED. Patient seen approxi-1 AM this date. She returns because the daughter concerned of her mental status. Reported by EMS that the patient has been staring and holding her head. Patient sustained a syncope and collapse of unclear etiology. This is why she was brought into ED for evaluation. Patient did undergo laboratory testing of which she had marijuana in her urine and lactate was 2.9 otherwise normal labs. CT head negative. CTA chest negative. Patient presents today because of left eye scleral injection, left headache without visual change. This is her primary concern or complaint is this left headache. She indicates no lateralizing weakness. .  She has no other related complaints. I do not find the patient altered in any way. She has good eye contact she focuses, answers questions correctly and appropriately and timely. No delays. She has neurosensory deficits. No lateralizing weakness reported by the patient. Nursing Notes were all reviewed and agreed with or any disagreements were addressed in the HPI.     REVIEW OF SYSTEMS    (2-9 systems for level 4, 10 or more for level 5)     Review of Systems    Positives and Pertinent negatives as per HPI. Except as noted above in the ROS, all other systems were reviewed and negative. PAST MEDICAL HISTORY     Past Medical History:   Diagnosis Date    [facility]erebral artery occlusion [facility] [address]     Hypertension     MI, old     Pilonidal cyst     Sickle cell trait ([facility] [address]     Vertigo          SURGICAL HISTORY     Past Surgical History:   Procedure Laterality Date    PILONIDAL CYST EXCISION           CURRENTMEDICATIONS       Previous Medications    AMLODIPINE (NORVASC) 10 MG TABLET    Take 10 mg by mouth daily    CYCLOBENZAPRINE (FLEXERIL) 10 MG TABLET    Take 10 mg by mouth nightly as needed for Muscle spasms    DOXYCYCLINE HYCLATE (VIBRAMYCIN) 100 MG CAPSULE    Take 100 mg by mouth 2 times daily    GUAIFENESIN-DEXTROMETHORPHAN (ROBITUSSIN DM) 100-10 MG/5ML SYRUP    Take 5 mLs by mouth 3 times daily as needed for Cough    METRONIDAZOLE (FLAGYL) 500 MG TABLET    Take 500 mg by mouth 2 times daily    ONDANSETRON (ZOFRAN) 4 MG TABLET    Take 1 tablet by mouth daily as needed for Nausea or Vomiting    SULFAMETHOXAZOLE-TRIMETHOPRIM (BACTRIM DS;SEPTRA DS) 800-160 MG PER TABLET    Take 1 tablet by mouth 2 times daily         ALLERGIES     Other    FAMILYHISTORY     History reviewed. No pertinent family history.        SOCIAL HISTORY       Social History     Tobacco Use    Smoking status: Current Every Day Smoker     Packs/day: 1.00     Types: Cigars    Smokeless tobacco: Never Used   Substance Use Topics    Alcohol use: No     Comment: occ    Drug use: Yes     Types: Marijuana     Comment: occ       SCREENINGS    [place] Coma Scale  Eye Opening: Spontaneous  Best Verbal Response: Oriented  Best Motor Response: Obeys commands  [place] Coma Scale Score: 15        PHYSICAL EXAM    (up to 7 for level 4, 8 or more for level 5)     ED Triage Vitals [04/29/21 1112]   BP Temp Temp Source Pulse Resp SpO2 Height Weight   (!) 141/94 98.7 °F (37.1 °C) Temporal 74 8 100 % 5' 5\" (1.651 m) 212 lb 8.4 oz (96.4 kg)       Physical Exam  Vitals signs and nursing note reviewed. Constitutional:       Appearance: She is well-developed and normal weight. HENT:      Head: Normocephalic. Right Ear: Tympanic membrane, ear canal and external ear normal.      Left Ear: Tympanic membrane, ear canal and external ear normal.   Eyes:      General: No scleral icterus. Right eye: No discharge. Left eye: No discharge. Extraocular Movements: Extraocular movements intact. Pupils: Pupils are equal, round, and reactive to light. Comments: Today shows scleral injection. With gentle palpation of both eye globes the patient indicates some discomfort of the left. She indicates some blurriness to the vision. Neck:      Musculoskeletal: Normal range of motion and neck supple. Pulmonary:      Effort: Pulmonary effort is normal.   Musculoskeletal: Normal range of motion. Skin:     General: Skin is warm and dry. Neurological:      Mental Status: She is alert and oriented to person, place, and time. Psychiatric:         Behavior: Behavior normal.                       DIAGNOSTIC RESULTS   LABS:    Labs Reviewed   CBC WITH AUTO DIFFERENTIAL - Abnormal; Notable for the following components:       Result Value    RDW 15.5 (*)     All other components within normal limits    Narrative:     Performed at:  [facility]  [address] [address]   Phone [phone]   HEPATIC FUNCTION PANEL - Abnormal; Notable for the following components:    ALT 9 (*)     AST 13 (*)     All other components within normal limits    Narrative:     Performed at:  [facility]  [address] [place], [place] [address]   Phone [phone]   CK - Abnormal; Notable for the following components:     Total  (*)     All other components within normal limits    Narrative:     Performed at:  [facility]  [address] [place], [place] [address]   Phone [phone]   PROTIME-INR    Narrative:     Performed at:  [facility]  [address] [facility], [place] [address]   Phone [phone]   APTT    Narrative:     Performed at:  [facility]  [address] [facility], [place] [address]   Phone [phone]   LACTIC ACID, PLASMA    Narrative:     Performed at:  [facility]  [address] [place], [place] [address]   Phone [phone]   PROCALCITONIN    Narrative:     Performed at:  [facility]  [address] [facility], [place] [address]   Phone [phone]   BASIC METABOLIC PANEL    Narrative:     Performed at:  [facility]  [address] [facility], [place] [address]   Phone [phone]       All other labs were within normal range or not returned as of this dictation. EKG: All EKG's are interpreted by the Emergency Department Physician in the absence of a cardiologist.  Please see their note for interpretation of EKG. RADIOLOGY:   Non-plain film images such as CT, Ultrasound and MRI are read by the radiologist. Plain radiographic images are visualized and preliminarily interpreted by the ED Provider with the below findings:        Interpretation per the Radiologist below, if available at the time of this note:    CT Head WO Contrast   Preliminary Result   1. No acute intracranial abnormality. 2. No definite acute maxillofacial fracture. There is an age-indeterminate   right orbital blowout fracture, most likely chronic. 3. Bilateral maxillary and ethmoid sinusitis. 4. No acute traumatic injury of the facial bones. CT FACIAL BONES WO CONTRAST   Preliminary Result   1. No acute intracranial abnormality. 2. No definite acute maxillofacial fracture.   There is an age-indeterminate   right orbital Ct Chest Pulmonary Embolism W Contrast    Result Date: 4/29/2021  EXAMINATION: CTA OF THE CHEST 4/29/2021 12:16 am TECHNIQUE: CTA of the chest was performed after the administration of intravenous contrast.  Multiplanar reformatted images are provided for review. MIP images are provided for review. Dose modulation, iterative reconstruction, and/or weight based adjustment of the mA/kV was utilized to reduce the radiation dose to as low as reasonably achievable. COMPARISON: None. HISTORY: ORDERING SYSTEM PROVIDED HISTORY: Syncope TECHNOLOGIST PROVIDED HISTORY: Reason for exam:->Syncope Decision Support Exception->Emergency Medical Condition (MA) Reason for Exam: syncope FINDINGS: Pulmonary Arteries: No filling defect in the pulmonary arteries to suggest pulmonary embolism. Mediastinum: No evidence of mediastinal lymphadenopathy. The heart and pericardium demonstrate no acute abnormality. There is no acute abnormality of the thoracic aorta. Lungs/pleura: The lungs are without acute process. No focal consolidation or pulmonary edema. No evidence of pleural effusion or pneumothorax. Upper Abdomen: Limited images of the upper abdomen are unremarkable. Soft Tissues/Bones: No acute bone or soft tissue abnormality. No acute finding in the chest with no evidence of pulmonary embolism. PROCEDURES     I did use tetracaine to anesthetize the eye surface. Some improvement in her discomfort. Injection and contusion noted. There was no stain uptake. Upper lid everted lower lid retracted nothing seen. I do believe patient is an ocular contusion without lens displacement. No corneal abrasion noted.      Procedures    CRITICAL CARE TIME   N/A    CONSULTS:  None      EMERGENCY DEPARTMENT COURSE and DIFFERENTIAL DIAGNOSIS/MDM:   Vitals:    Vitals:    04/29/21 1112 04/29/21 1216 04/29/21 1332 04/29/21 1346   BP: (!) 141/94 (!) 145/92 126/68 (!) 141/82   Pulse: 74 72 66 70   Resp: 8 17 19 20   Temp: 98.7 °F (37.1 °C)      TempSrc: Temporal      SpO2: 100% 100%     Weight: 212 lb 8.4 oz (96.4 kg)      Height: 5' 5\" (1.651 m)          Patient was given the following medications:  Medications   tetracaine (TETRAVISC) 0.5 % ophthalmic solution 1 drop (1 drop Both Eyes Given 4/29/21 1259)   fluorescein ophthalmic strip 1 mg (1 mg Ophthalmic Given 4/29/21 1259)   ketorolac (TORADOL) injection 30 mg (30 mg Intramuscular Given 4/29/21 1256)         At 1:20 PM I reassessed patient. Headache is now resolved. She was given Toradol 30 mg IM. With postconcussive headache. She is very sleepy due to being up all night. Headache has resolved. Examination of the left eye shows injection and injury and no stain uptake. 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  She will see them tomorrow. Repeat CT scan is reassuring as well as facial bone CT reassuring showing no evidence of injury or fracture. Old right orbital floor fracture noted. Patient acknowledged. Visual acuity 20/30 left, 20/50 right. We will send patient home with Motrin, Tylenol and a few Fioricet. Cool compress may benefit. Patient reassured. She is to expect headache over the next several days. Information on postconcussive syndrome and head injury given. FINAL IMPRESSION      1. Nonintractable headache, unspecified chronicity pattern, unspecified headache type    2. Contusion of globe of left eye, initial encounter    3. Concussion syndrome    4. Injury of head, subsequent encounter          DISPOSITION/PLAN   DISPOSITION        PATIENT REFERREDTO:  No follow-up provider specified.     DISCHARGE MEDICATIONS:  New Prescriptions    ACETAMINOPHEN (TYLENOL) 500 MG TABLET    Take 2 tablets by mouth 3 times daily (with meals) for 7 days    BUTALBITAL-ACETAMINOPHEN-CAFFEINE (FIORICET, ESGIC) -40 MG PER TABLET    Take 1 tablet by mouth every 6-8 hours as needed for Headaches    IBUPROFEN (ADVIL;MOTRIN) 600 MG TABLET    Take 1 tablet by mouth 3 times daily (with meals) for 7 days       DISCONTINUED MEDICATIONS:  Discontinued Medications    ACETAMINOPHEN (TYLENOL) 500 MG TABLET    Take 1 tablet by mouth 4 times daily as needed for Pain    IBUPROFEN (ADVIL;MOTRIN) 800 MG TABLET    Take 800 mg by mouth every 8 hours as needed for Pain              (Please note that portions of this note were completed with a voice recognition program.  Efforts were made to edit the dictations but occasionally words are mis-transcribed. )    Yasmin Cisneros PA-C (electronically signed)           Yasmin Cisneros PA-C  04/29/21 8077

## 2021-04-29 NOTE — ED NOTES

## 2021-04-29 NOTE — ED NOTES
Bed: B-09  Expected date:   Expected time:   Means of arrival:   Comments:  Giselle altered mental status 37 yo      Christiano Almanzar RN  04/28/21 5998

## 2021-06-10 ENCOUNTER — TELEPHONE (OUTPATIENT)
Dept: ORTHOPEDIC SURGERY | Age: 41
End: 2021-06-10

## 2021-06-10 ENCOUNTER — APPOINTMENT (OUTPATIENT)
Dept: GENERAL RADIOLOGY | Age: 41
End: 2021-06-10
Payer: COMMERCIAL

## 2021-06-10 ENCOUNTER — HOSPITAL ENCOUNTER (EMERGENCY)
Age: 41
Discharge: HOME | End: 2021-06-10
Attending: EMERGENCY MEDICINE
Payer: COMMERCIAL

## 2021-06-10 VITALS
HEIGHT: 65 IN | BODY MASS INDEX: 37.5 KG/M2 | SYSTOLIC BLOOD PRESSURE: 155 MMHG | WEIGHT: 225.09 LBS | TEMPERATURE: 97.5 F | HEART RATE: 69 BPM | RESPIRATION RATE: 16 BRPM | OXYGEN SATURATION: 100 % | DIASTOLIC BLOOD PRESSURE: 100 MMHG

## 2021-06-10 DIAGNOSIS — M25.561 ACUTE PAIN OF RIGHT KNEE: Primary | ICD-10-CM

## 2021-06-10 PROCEDURE — 73560 X-RAY EXAM OF KNEE 1 OR 2: CPT

## 2021-06-10 PROCEDURE — 99284 EMERGENCY DEPT VISIT MOD MDM: CPT

## 2021-06-10 PROCEDURE — 6370000000 HC RX 637 (ALT 250 FOR IP): Performed by: EMERGENCY MEDICINE

## 2021-06-10 RX ORDER — CELECOXIB 100 MG/1
100 CAPSULE ORAL 2 TIMES DAILY
Qty: 12 CAPSULE | Refills: 1 | Status: SHIPPED | OUTPATIENT
Start: 2021-06-10 | End: 2021-07-09 | Stop reason: ALTCHOICE

## 2021-06-10 RX ORDER — CELECOXIB 100 MG/1
100 CAPSULE ORAL ONCE
Status: COMPLETED | OUTPATIENT
Start: 2021-06-10 | End: 2021-06-10

## 2021-06-10 RX ADMIN — CELECOXIB 100 MG: 100 CAPSULE ORAL at 05:50

## 2021-06-10 ASSESSMENT — PAIN DESCRIPTION - ONSET
ONSET: GRADUAL
ONSET: GRADUAL

## 2021-06-10 ASSESSMENT — PAIN - FUNCTIONAL ASSESSMENT: PAIN_FUNCTIONAL_ASSESSMENT: 0-10

## 2021-06-10 ASSESSMENT — PAIN SCALES - GENERAL
PAINLEVEL_OUTOF10: 10

## 2021-06-10 ASSESSMENT — ENCOUNTER SYMPTOMS
EYE ITCHING: 0
ABDOMINAL DISTENTION: 0
EYE DISCHARGE: 0
APNEA: 0
SHORTNESS OF BREATH: 0
CHOKING: 0
EYE REDNESS: 0
WHEEZING: 0
CHEST TIGHTNESS: 0
PHOTOPHOBIA: 0
COUGH: 0
STRIDOR: 0
EYE PAIN: 0

## 2021-06-10 ASSESSMENT — PAIN DESCRIPTION - LOCATION
LOCATION: KNEE
LOCATION: KNEE

## 2021-06-10 ASSESSMENT — PAIN DESCRIPTION - DESCRIPTORS
DESCRIPTORS: THROBBING
DESCRIPTORS: THROBBING

## 2021-06-10 ASSESSMENT — PAIN DESCRIPTION - FREQUENCY
FREQUENCY: CONTINUOUS
FREQUENCY: CONTINUOUS

## 2021-06-10 ASSESSMENT — PAIN DESCRIPTION - PROGRESSION
CLINICAL_PROGRESSION: GRADUALLY WORSENING
CLINICAL_PROGRESSION: NOT CHANGED

## 2021-06-10 ASSESSMENT — PAIN DESCRIPTION - PAIN TYPE
TYPE: ACUTE PAIN
TYPE: ACUTE PAIN

## 2021-06-10 ASSESSMENT — PAIN DESCRIPTION - ORIENTATION
ORIENTATION: RIGHT
ORIENTATION: RIGHT

## 2021-06-10 NOTE — TELEPHONE ENCOUNTER
General Question     Subject: appoint for tomorrow  Patient and /or Facility Request: [facility]ino Number: [phone]  Patient was seen at [address] today and wants an appointment for tomorrow she's in a lot of pain and concerned about work. Can she be seen tomorrow by dr. Chava Mondragon he was on call.

## 2021-06-10 NOTE — ED NOTES
Bed: B-32  Expected date:   Expected time:   Means of arrival:   Comments:  Fall knee pain     Nina Broderick RN  06/10/21 9703

## 2021-06-10 NOTE — ED TRIAGE NOTES
Pt arrived to the ED via EMS from home. Pt states she was leaving to go to work and a cicada flew at her and she tried to duck away from it. Pt states she fell down one step and landed on her right knee.  Right knee pain 10/10

## 2021-06-10 NOTE — ED PROVIDER NOTES
[address] [facility]      Pt Name: Nino Baldwin  MRN: [id number]  [place] 1980  Date of evaluation: 6/10/2021  Provider: Jessica Velazquez MD    CHIEF COMPLAINT       Chief Complaint   Patient presents with    Fall     pt states she was walking out leaving for work and a cicada flew at her and she fell down 1 step and landed on right knee. pain 10/10    Knee Pain       HISTORY OF PRESENT ILLNESS    Nino Baldwin is a 38 y.o. female who presents to the emergency department with. Patient states she tripped and fell today. Landed on her right knee. Pain is 10 out of 10 achy in nature. Cannot ambulate. No other injuries or insults. No head trauma. No other associated symptoms. Took nothing for pain. Nursing Notes were reviewed. Including nursing noted for FM, Surgical History, Past Medical History, Social History, vitals, and allergies; agree with all. REVIEW OF SYSTEMS       Review of Systems   Constitutional: Negative for activity change, appetite change, chills, diaphoresis, fatigue, fever and unexpected weight change. HENT: Negative for congestion, dental problem, drooling and ear discharge. Eyes: Negative for photophobia, pain, discharge, redness and itching. Respiratory: Negative for apnea, cough, choking, chest tightness, shortness of breath, wheezing and stridor. Cardiovascular: Negative for chest pain and leg swelling. Gastrointestinal: Negative for abdominal distention. Endocrine: Negative for polyphagia and polyuria. Genitourinary: Negative for vaginal bleeding, vaginal discharge and vaginal pain. Musculoskeletal: Positive for arthralgias. Neurological: Negative for dizziness, facial asymmetry and headaches. Hematological: Negative for adenopathy. Does not bruise/bleed easily.    Psychiatric/Behavioral: Negative for agitation, behavioral problems, confusion, decreased concentration, dysphoric mood, Types: Cigars    Smokeless tobacco: Never Used   Vaping Use    Vaping Use: Never used   Substance and Sexual Activity    Alcohol use: No     Comment: occ    Drug use: Yes     Types: Marijuana     Comment: occ    Sexual activity: Not Currently     Comment: occasional   Other Topics Concern    None   Social History Narrative    ** Merged History Encounter **         ** Merged History Encounter **          Social Determinants of Health     Financial Resource Strain:     Difficulty of Paying Living Expenses:    Food Insecurity:     Worried About Running Out of Food in the Last Year:     Ran Out of Food in the Last Year:    Transportation Needs:     Lack of Transportation (Medical):  Lack of Transportation (Non-Medical):    Physical Activity:     Days of Exercise per Week:     Minutes of Exercise per Session:    Stress:     Feeling of Stress :    Social Connections:     Frequency of Communication with Friends and Family:     Frequency of Social Gatherings with Friends and Family:     Attends [employer] Services:     Active Member of Clubs or Organizations:     Attends Club or Organization Meetings:     Marital Status:    Intimate Partner Violence:     Fear of Current or Ex-Partner:     Emotionally Abused:     Physically Abused:     Sexually Abused:        PHYSICAL EXAM       ED Triage Vitals [06/10/21 0435]   BP Temp Temp Source Pulse Resp SpO2 Height Weight   (!) 155/100 97.5 °F (36.4 °C) Oral 69 16 100 % 5' 5\" (1.651 m) 225 lb 1.4 oz (102.1 kg)       Physical Exam  Vitals and nursing note reviewed. Constitutional:       General: She is not in acute distress. Appearance: She is well-developed. She is not ill-appearing, toxic-appearing or diaphoretic. HENT:      Head: Normocephalic and atraumatic. Right Ear: External ear normal.      Left Ear: External ear normal.   Eyes:      General:         Right eye: No discharge. Left eye: No discharge.       Conjunctiva/sclera: Conjunctivae normal.      Pupils: Pupils are equal, round, and reactive to light. Cardiovascular:      Rate and Rhythm: Normal rate and regular rhythm. Heart sounds: No murmur heard. Pulmonary:      Effort: Pulmonary effort is normal. No respiratory distress. Breath sounds: Normal breath sounds. No wheezing or rales. Abdominal:      General: Bowel sounds are normal. There is no distension. Palpations: Abdomen is soft. There is no mass. Tenderness: There is no abdominal tenderness. There is no guarding or rebound. Genitourinary:     Comments: Deferred  Musculoskeletal:         General: Swelling and tenderness present. No deformity. Cervical back: Normal range of motion and neck supple. Comments: Tenderness to palpation and swelling to the right knee. Full range of motion intact just painful. Soft warm compartments. Strong distal pulses. No evidence of abrasion or open wounds. Skin:     General: Skin is warm. Findings: No erythema or rash. Neurological:      Mental Status: She is alert and oriented to person, place, and time. She is not disoriented. Cranial Nerves: No cranial nerve deficit. Motor: No atrophy or abnormal muscle tone. Coordination: Coordination normal.   Psychiatric:         Behavior: Behavior normal.         Thought Content:  Thought content normal.         DIAGNOSTIC RESULTS     EKG: All EKG's are interpreted by the Emergency Department Physician who either signs or Co-signs this chart in the absence of acardiologist.    None    RADIOLOGY:   Non-plain film images such as CT, Ultrasoundand MRI are read by the radiologist. Plain radiographic images are visualized and preliminarily interpreted by the emergency physician with the below findings:    Impression   There is a 13 mm osseous fragment within the posterior joint space, with the   anterior margin not well corticated raising concern for possible internal   derangement with an avulsed fracture fragment.  This would be best assessed   with MR imaging.       Quadriceps insertional enthesophytes.       Small joint effusion.       Tricompartmental osteoarthritis. ED BEDSIDE ULTRASOUND:   Performed by ED Physician - none    LABS:  Labs Reviewed - No data to display    All other labs were withinnormal range or not returned as of this dictation. EMERGENCY DEPARTMENT COURSE and DIFFERENTIAL DIAGNOSIS/MDM:     PMH, Surgical Hx, FH, Social Hx reviewed by myself (ETOH usage, Tobacco usage, Drug usage reviewed by myself, no pertinent Hx)- No Pertinent Hx     Old records were reviewed by me     Dr Kayleigh Tejeda consulted. Knee immobilizer placed for comfort with crutches. Close ortho follow up 1-2 days. I estimate there is LOW risk for Sepsis, MI, Stroke, Tamponade, PTX, Toxicity or other life threatening etiology thus I consider the discharge disposition reasonable. The patient is at low risk for mortality based on demographic, history and clinical factors. Given the best available information and clinical assessment, I estimate the risk of hospitalization to be greater than risk of treatment at home. I have explained to the patient that the risk could rapidly change, given precautions for return and instructions. Explained to patient that the risk for mortality is low based on demographic, history and clinical factors. I discussed with patient the results of evaluation in the ED, diagnosis, care, and prognosis. The plan is to discharge to home. Patient is in agreement with plan and questions have been answered. I also discussed with patient the reasons which may require a return visit and the importance of follow-up care. The patient is well-appearing, nontoxic, and improved at the time of discharge. Patient agrees to call to arrange follow-up care as directed. Patient understands to return immediately for worsening/change in symptoms.       CRITICAL CARE TIME   Total Critical Caretime was 21 minutes, excluding separately reportable procedures. There was a high probability of clinically significant/life threatening deterioration in the patient's condition which required my urgent intervention. PROCEDURES:  Unlessotherwise noted below, none    FINAL IMPRESSION      1.  Acute pain of right knee          DISPOSITION/PLAN   DISPOSITION Decision To Discharge 06/10/2021 05:32:10 AM    PATIENT REFERRED TO:  Chantale Barnett MD  [address] [address]  [phone]    Call today        DISCHARGE MEDICATIONS:  New Prescriptions    CELECOXIB (CELEBREX) 100 MG CAPSULE    Take 1 capsule by mouth 2 times daily          (Please note that portions ofthis note were completed with a voice recognition program.  Efforts were made to edit the dictations but occasionally words are mis-transcribed.)    Sofie Mensah MD(electronically signed)  Attending Emergency Physician          Sofie Mensah MD  06/10/21 0600

## 2021-06-11 ENCOUNTER — OFFICE VISIT (OUTPATIENT)
Dept: ORTHOPEDIC SURGERY | Age: 41
End: 2021-06-11
Payer: COMMERCIAL

## 2021-06-11 VITALS — RESPIRATION RATE: 16 BRPM | WEIGHT: 205 LBS | HEIGHT: 65 IN | BODY MASS INDEX: 34.16 KG/M2

## 2021-06-11 DIAGNOSIS — M25.561 ACUTE PAIN OF RIGHT KNEE: Primary | ICD-10-CM

## 2021-06-11 PROCEDURE — 99203 OFFICE O/P NEW LOW 30 MIN: CPT | Performed by: PHYSICIAN ASSISTANT

## 2021-06-11 PROCEDURE — G8428 CUR MEDS NOT DOCUMENT: HCPCS | Performed by: PHYSICIAN ASSISTANT

## 2021-06-11 PROCEDURE — G8417 CALC BMI ABV UP PARAM F/U: HCPCS | Performed by: PHYSICIAN ASSISTANT

## 2021-06-11 PROCEDURE — 4004F PT TOBACCO SCREEN RCVD TLK: CPT | Performed by: PHYSICIAN ASSISTANT

## 2021-06-11 NOTE — PROGRESS NOTES
This dictation was done with Dragon dictation and may contain mechanical errors related to translation. I have today reviewed with Sanford Moran the clinically relevant, past medical history, medications, allergies, family history, social history, and Review Of Systems form the patients most recent history form & I have documented any details relevant to today's presenting complaints in my history below. Ms. Martita Echols's self-reported past medical history, medications, allergies, family history, social history, and Review Of Systems form has been scanned into the chart under the \"Media\" tab. Subjective:  Sanford Moran is a 38 y.o. who is here complaining of acute onset of right knee pain with an injury yesterday. 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 She felt a pop had swelling soreness and restriction of motion. She went to the emergency department in which she took an x-ray and that she saw a 13 mm osseous fragment within the posterior joint space and she was referred for further treatment      There is no problem list on file for this patient.           Current Outpatient Medications on File Prior to Visit   Medication Sig Dispense Refill    celecoxib (CELEBREX) 100 MG capsule Take 1 capsule by mouth 2 times daily 12 capsule 1    acetaminophen (TYLENOL) 500 MG tablet Take 2 tablets by mouth 3 times daily (with meals) for 7 days 42 tablet 0    butalbital-acetaminophen-caffeine (FIORICET, ESGIC) -40 MG per tablet Take 1 tablet by mouth every 6-8 hours as needed for Headaches 8 tablet 3    guaiFENesin-dextromethorphan (ROBITUSSIN DM) 100-10 MG/5ML syrup Take 5 mLs by mouth 3 times daily as needed for Cough 50 mL 0    ondansetron (ZOFRAN) 4 MG tablet Take 1 tablet by mouth daily as needed for Nausea or Vomiting 20 tablet 0    metroNIDAZOLE (FLAGYL) 500 MG tablet Take 500 mg by mouth 2 times daily      doxycycline hyclate (VIBRAMYCIN) 100 MG capsule Take 100 mg by mouth 2 times daily      sulfamethoxazole-trimethoprim (BACTRIM DS;SEPTRA DS) 800-160 MG per tablet Take 1 tablet by mouth 2 times daily      cyclobenzaprine (FLEXERIL) 10 MG tablet Take 10 mg by mouth nightly as needed for Muscle spasms      amLODIPine (NORVASC) 10 MG tablet Take 10 mg by mouth daily       No current facility-administered medications on file prior to visit. Objective:   Resp. rate 16, height 5' 5\" (1.651 m), weight 205 lb (93 kg), not currently breastfeeding. Upon examination this is a very pleasant 41-year-old female in mild distress she is alert and oriented x3 she has 2+ edema she has a lot of swelling and soreness in tenderness in the medial compartment and the posterior aspect of her right knee. She has got posterior swelling and motion around 10 degrees short of full extension and 85 degrees of flexion. This causes a lot of pain and pinching in the medial compartment. I feel like there is a slight anterior drawer and there is x-ray evidence from the emergency department of osseous fragment that could be consistent with a ligament injury. With her lack of full range of motion the mechanism of injury and the x-rays I am concerned for internal derangement. Neuro exam grossly intact both lower extremities. Intact sensation to light touch. Motor exam 4+ to 5/5 in all major motor groups. Negative Marr's sign. Skin is warm, dry and intact with out erythema or significant increased temperature around the knee joint(s). There are no cutaneous lesions or lymphadenopathy present. X-RAYS:  X-rays taken the office today included on AP standing and sunrise view of her right knee.   This AP standing showed some joint space loss consistent with osteoarthritis patella tracking is very good no fractures or other bone abnormalities were seen on these 2 views. The lateral x-ray taken yesterday at the emergency department shows the osseous fragment posteriorly      Assessment:  Right knee medial meniscus tear possible ACL tear with posterior osseous fragment    Plan:  During today's visit, there was approximately 30 minutes of face-to-face discussion in regards to the patient's current condition and treatment options. More than 50 % of the time was counseling and coordination of care as indicated above. Given the mechanism of injury clinical exam and radiographic findings I am concerned about either a ligament tear or mechanical medial meniscus.   With lack of full motion and ability to be able to stand and soreness I think a stat MRI is warranted      PROCEDURE NOTE:   Order MRI to look for internal derangement including osseous fragment fracture ACL tear or medial meniscus tear      They will schedule a follow up in after the MRI

## 2021-06-14 ENCOUNTER — HOSPITAL ENCOUNTER (OUTPATIENT)
Dept: MRI IMAGING | Age: 41
Discharge: HOME | End: 2021-06-14
Payer: COMMERCIAL

## 2021-06-14 ENCOUNTER — TELEPHONE (OUTPATIENT)
Dept: ORTHOPEDIC SURGERY | Age: 41
End: 2021-06-14

## 2021-06-14 DIAGNOSIS — M25.561 ACUTE PAIN OF RIGHT KNEE: ICD-10-CM

## 2021-06-14 PROCEDURE — 73721 MRI JNT OF LWR EXTRE W/O DYE: CPT

## 2021-06-14 NOTE — TELEPHONE ENCOUNTER
[address] Name: Aurelio Espinal  Contact Name: Zohra Enamorado Number: [phone]  Request or Information: CALLING TO PROVIDE TEST RESULTS FOR MRI R KNEE

## 2021-06-16 ENCOUNTER — TELEPHONE (OUTPATIENT)
Dept: ORTHOPEDIC SURGERY | Age: 41
End: 2021-06-16

## 2021-06-17 NOTE — TELEPHONE ENCOUNTER
Test Results     Type of Test: R KNEE MRI  Date of Test: 06-14-21  Location of Test: [facility]  Patient Contact Number: [phone]

## 2021-06-17 NOTE — TELEPHONE ENCOUNTER
I called the patient to discuss her right knee MRI results which displays a ruptured ACL and medial meniscus tear as well as a posterior medial tibial plateau fracture. I discussed that I will refer her to Dr. Truong Laughlin for further management and possible surgery.

## 2021-06-25 ENCOUNTER — TELEPHONE (OUTPATIENT)
Dept: ORTHOPEDIC SURGERY | Age: 41
End: 2021-06-25

## 2021-06-25 ENCOUNTER — OFFICE VISIT (OUTPATIENT)
Dept: ORTHOPEDIC SURGERY | Age: 41
End: 2021-06-25
Payer: COMMERCIAL

## 2021-06-25 VITALS
SYSTOLIC BLOOD PRESSURE: 130 MMHG | RESPIRATION RATE: 12 BRPM | DIASTOLIC BLOOD PRESSURE: 89 MMHG | HEART RATE: 69 BPM | HEIGHT: 65 IN | WEIGHT: 205 LBS | BODY MASS INDEX: 34.16 KG/M2

## 2021-06-25 DIAGNOSIS — M25.561 ACUTE PAIN OF RIGHT KNEE: Primary | ICD-10-CM

## 2021-06-25 PROCEDURE — G8427 DOCREV CUR MEDS BY ELIG CLIN: HCPCS | Performed by: ORTHOPAEDIC SURGERY

## 2021-06-25 PROCEDURE — L1810 KO ELASTIC WITH JOINTS: HCPCS | Performed by: ORTHOPAEDIC SURGERY

## 2021-06-25 PROCEDURE — G8417 CALC BMI ABV UP PARAM F/U: HCPCS | Performed by: ORTHOPAEDIC SURGERY

## 2021-06-25 PROCEDURE — 4004F PT TOBACCO SCREEN RCVD TLK: CPT | Performed by: ORTHOPAEDIC SURGERY

## 2021-06-25 PROCEDURE — 99214 OFFICE O/P EST MOD 30 MIN: CPT | Performed by: ORTHOPAEDIC SURGERY

## 2021-06-25 RX ORDER — LEVETIRACETAM 1000 MG/1
1000 TABLET ORAL 2 TIMES DAILY
COMMUNITY
Start: 2021-06-03 | End: 2022-06-16

## 2021-06-25 ASSESSMENT — ENCOUNTER SYMPTOMS
EYES NEGATIVE: 1
GASTROINTESTINAL NEGATIVE: 1
ALLERGIC/IMMUNOLOGIC NEGATIVE: 1
SINUS PRESSURE: 1
BACK PAIN: 1
RESPIRATORY NEGATIVE: 1

## 2021-06-25 NOTE — LETTER
[facility]  [address]  Phone: [phone]  Fax: [phone]    Marta Fonseca MD        June 25, 2021     Patient: Clayton Jones   YOB: 1980   Date of Visit: 6/25/2021       To Whom It May Concern: It is my medical opinion that Clayton Jones may return to work on 6-28-21 with the following restrictions: sit down work only . If you have any questions or concerns, please don't hesitate to call.     Sincerely,        Marta Fonseca MD

## 2021-06-25 NOTE — PROGRESS NOTES
Subjective:      Patient ID: Sherryle Petri is a 38 y.o. female. HPI  Sherryle Petri the request of Dr. Tenzin Scott for evaluation of a right knee injury. She was injured when she went to swat a [place] and slipped off a wet step. This injury occurred Ileana 10. She was seen in the ER and subsequently by Dr. Mae Hearn. MRI scan was obtained she was sent to me for an ACL injury. She has multiple medical problems including seizure disorder. She works as a manager at [employer]. She smokes. Per her report she has had an ACL tear for at least several years. Currently she is complaining of pain at 6 out of 10 with standing and walking. She was given a knee immobilizer but admits she has not been using it she is trying not to use her crutches. Review of Systems   Constitutional: Negative. HENT: Positive for congestion, postnasal drip and sinus pressure. Current sinus infection     Eyes: Negative. Respiratory: Negative. Cardiovascular: Negative. Gastrointestinal: Negative. Endocrine: Negative. Genitourinary: Negative. Musculoskeletal: Positive for back pain. Skin: Negative. Allergic/Immunologic: Negative. Neurological: Positive for seizures and headaches. Epileptic   Hematological: Bruises/bleeds easily. Psychiatric/Behavioral: Negative. Objective:   Physical Exam  History: Patient's relevant past family, medical, and social history are reviewed as part of today's visit. ROS of pertinent positives and negatives as above; otherwise negative. General Exam:    Vitals: Blood pressure 130/89, pulse 69, resp. rate 12, height 5' 5\" (1.651 m), weight 205 lb (93 kg), not currently breastfeeding. Constitutional: Patient is adequately groomed with no evidence of malnutrition  Mental Status: The patient is oriented to time, place and person. The patient's mood and affect are appropriate.   Gait:  Patient walks with stifflegged gait and crutches  Lymphatic: The lymphatic examination bilaterally reveals all areas to be without enlargement or induration. Vascular: Examination reveals no swelling or calf tenderness. Peripheral pulses are palpable and 2+. Neurological: The patient has good coordination. There is no weakness or sensory deficit. Skin:    Head/Neck: inspection reveals no rashes, ulcerations or lesions. Trunk:  inspection reveals no rashes, ulcerations or lesions. Right Lower Extremity: inspection reveals no rashes, ulcerations or lesions. Left Lower Extremity: inspection reveals no rashes, ulcerations or lesions. Examination of the bilateral hips reveals normal flexion and extension. There is no restriction in rotation. There is no tenderness to palpation anteriorly posteriorly or laterally. Left knee is increased anterior translation. She has mild crepitation but no effusion. She has full motion. Right knee has range of motion 0 to about 85 degrees. Lachman is a bit equivocal but no profound instability. She has a moderate effusion no medial or lateral joint line tenderness. She does have some tenderness posteriorly. Right calf demonstrates moderate tenderness and some mild fullness. Plain film right knee x-rays are reviewed which demonstrate: Fragmentation of the posterior aspect of the tibial plateau    Right knee MRI is reviewed. It demonstrates:  MENISCI: Radial tear involving posterior horn/root of medial meniscus.    Oblique tear exiting inferiorly involving body and junction of the body of   the posterior horn of the medial meniscus.  Medial extrusion of the body of   the medial meniscus, likely relating to loss of normal hoop stress.  Lateral   meniscus appears intact and normal in morphology.  No parameniscal cysts are   seen.       CRUCIATE LIGAMENTS: ACL is ruptured.  PCL appears intact.       EXTENSOR MECHANISM: Quadriceps and patellar tendons appear intact and   unremarkable.  Medial and lateral patellar retinacula are intact.       LATERAL COLLATERAL LIGAMENT COMPLEX: Lateral collateral ligament complex   appears intact.  Popliteus tendon appears intact.  Mild edema to the   popliteus muscle.       MEDIAL COLLATERAL LIGAMENT COMPLEX: Medial collateral ligament appears intact   but mildly thickened proximally with slight intrinsic T2 hyperintensity and   mild surrounding edema.       KNEE JOINT: Moderate-to-large knee joint effusion.  No intra-articular loose   bodies.  No significant Baker's cyst.       Focal full-thickness chondral defect with possible developing underlying   osteochondral lesion involving the paramidline lateral aspect of the inferior   femoral trochlear articular cartilage.  This measures 5 x 6 mm in size.  No   unstable in situ fragment.  Remainder of the articular cartilage of the knee   appears to be well maintained.       BONE MARROW: Bone marrow edema to the posterior aspects of the medial and   lateral tibial plateaus.  No fracture associated with the lateral tibial   plateau, but there appears to be mildly comminuted and depressed fracture   involving the posterior aspect of the medial tibial plateau with depression   by up to approximately 4 mm.       No suspicious focal bony lesions.       OTHER: Subcutaneous edema about the knee, especially anteriorly.  Mild muscle   edema to the proximal soleus muscle.           Impression   Tears of the medial meniscus involving posterior horn/root as well as   junction of body and posterior horn as above.       Ruptured ACL.        Acute traumatic mildly comminuted and depressed fracture to the posterior   aspect of the medial tibial plateau.  CT may be of use to better assess fine   bony detail.       Bone marrow contusion without fracture identified involving posterior aspect   of the lateral tibial plateau.       Low-grade sprain of the MCL.       Low-grade strains/contusions of the popliteus muscle and proximal soleus   muscle.       Moderate-to-large knee joint effusion.       Focal full-thickness chondral defect with possible developing underlying   osteochondral lesion involving the femoral trochlear articular cartilage as   above.  No unstable in situ fragment. I reviewed her records from [place]. Right knee MRI dated 11/18/2019 demonstrates:IMPRESSION:   1.  No meniscal tear. 2.  Suspected chronic tear of the posteromedial bundle of the ACL with subcortical cystic changes in the tibial tuberosity. 3.  Focal full thickness chondral loss/fissure in the weightbearing lateral femoral condyle.          Assessment:      Chronic right ACL tear. New medial meniscal tear and posterior medial tibial plateau fracture. Clinical concern for DVT right calf      Plan:      She needs to be evaluated for a DVT. She is going to the emergency room at [facility] per her choice. From a knee standpoint we will attempt to try nonoperative management for her chronic ACL injury. Possible she might require surgery for her meniscus but initially we need to get her tibial plateau injury to heal.  I applied a more usable knee brace in the office today. She will continue with her crutches. 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 I like to see her again in 2 weeks with repeat x-rays. This note was created using voice recognition software. It has been proofread, but occasionally errors remain. Please disregard these errors. They will be corrected as they are noted.

## 2021-07-09 ENCOUNTER — OFFICE VISIT (OUTPATIENT)
Dept: ORTHOPEDIC SURGERY | Age: 41
End: 2021-07-09
Payer: COMMERCIAL

## 2021-07-09 VITALS
BODY MASS INDEX: 34.49 KG/M2 | HEART RATE: 74 BPM | SYSTOLIC BLOOD PRESSURE: 157 MMHG | WEIGHT: 207 LBS | HEIGHT: 65 IN | DIASTOLIC BLOOD PRESSURE: 91 MMHG

## 2021-07-09 DIAGNOSIS — S83.511A CHRONIC RUPTURE OF ACL OF RIGHT KNEE: ICD-10-CM

## 2021-07-09 DIAGNOSIS — M25.561 ACUTE PAIN OF RIGHT KNEE: Primary | ICD-10-CM

## 2021-07-09 PROCEDURE — G8417 CALC BMI ABV UP PARAM F/U: HCPCS | Performed by: ORTHOPAEDIC SURGERY

## 2021-07-09 PROCEDURE — 99212 OFFICE O/P EST SF 10 MIN: CPT | Performed by: ORTHOPAEDIC SURGERY

## 2021-07-09 PROCEDURE — G8427 DOCREV CUR MEDS BY ELIG CLIN: HCPCS | Performed by: ORTHOPAEDIC SURGERY

## 2021-07-09 PROCEDURE — 4004F PT TOBACCO SCREEN RCVD TLK: CPT | Performed by: ORTHOPAEDIC SURGERY

## 2021-07-09 NOTE — PROGRESS NOTES
Kings Naranjo returns today to follow-up her right knee. At her last visit I was concerned about a DVT. She was evaluated and diagnosed at [facility] with a DVT and is now taking Eliquis. Currently she says she is pain-free. She feels like she can come off her crutches. I have reviewed the patient's medical history in detail and updated the computerized patient record. General Exam:    Vitals: Blood pressure (!) 157/91, pulse 74, height 5' 5\" (1.651 m), weight 207 lb (93.9 kg), not currently breastfeeding. Constitutional: Patient is adequately groomed with no evidence of malnutrition  Mental Status: The patient is oriented to time, place and person. The patient's mood and affect are appropriate. Right knee today is range of motion 0 to 110 degrees. She has no drainable effusion. She has mild medial joint line pain. Lachman has increased anterior translation. Calf is soft today without swelling. Assessment: Subjectively her right knee is improving significantly. Plan: I am going to allow her to come off crutches. She will start physical therapy to work on range of motion and strength and follow-up with me in a month. 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 This note was created using voice recognition software. It has been proofread, but occasionally errors remain. Please disregard these errors. They will be corrected as they are noted.

## 2021-07-09 NOTE — LETTER
[facility] [address] [place] [address] [address]  Phone: [phone]  Fax: [phone]    Josef Kim MD        July 9, 2021     Patient: Marilee Chandra   YOB: 1980   Date of Visit: 7/9/2021       To Whom It May Concern: It is my medical opinion that Marilee Chandra may return to sit down work only. She will be reevaluated in one month. If you have any questions or concerns, please don't hesitate to call.     Sincerely,        Josef Kim MD

## 2021-07-15 ENCOUNTER — HOSPITAL ENCOUNTER (OUTPATIENT)
Dept: PHYSICAL THERAPY | Age: 41
Setting detail: THERAPIES SERIES
Discharge: HOME | End: 2021-07-15
Payer: COMMERCIAL

## 2021-07-15 PROCEDURE — 97112 NEUROMUSCULAR REEDUCATION: CPT

## 2021-07-15 PROCEDURE — 97530 THERAPEUTIC ACTIVITIES: CPT

## 2021-07-15 PROCEDURE — 97016 VASOPNEUMATIC DEVICE THERAPY: CPT

## 2021-07-15 PROCEDURE — 97162 PT EVAL MOD COMPLEX 30 MIN: CPT

## 2021-07-15 PROCEDURE — 97110 THERAPEUTIC EXERCISES: CPT

## 2021-07-15 NOTE — FLOWSHEET NOTE
[address] [facility], [place]    Physical Therapy Daily Treatment Note  Date:  7/15/2021    Patient Name:  Jacqueline Pineda"  :  1980  MRN: [id number]  Restrictions/Precautions:    Medical/Treatment Diagnosis Information:  · Diagnosis: M25.561 (ICD-10-CM) - Acute pain of right knee  · Treatment Diagnosis: M25.561 R Knee Pain; M25.361 R Knee Instability; M62.559 Atrophy of unspecified thigh;   Insurance/Certification information:  PT Insurance Information: [employer]; 30 PT visits; auth required  Physician Information:  Referring Practitioner: Dr. Latrell Overton  Has the plan of care been signed (Y/N):        []  Yes  [x]  No     Date of Patient follow up with Physician: 21      Is this a Progress Report:     []  Yes  [x]  No        If Yes:  Date Range for reporting period:  Beginnin/15/21  Ending    Progress report will be due (10 Rx or 30 days whichever is less):        Recertification will be due (POC Duration  / 90 days whichever is less): 10/15/21         Visit # Insurance Allowable Auth Required   1 30 VPCY []  Yes []  No      OUTCOME MEASURE DATE DEFICIT   WOMAC 7/15/21 79% Deficit                    Latex Allergy:  [x]NO      []YES  Preferred Language for Healthcare:   [x]English       []other:    Pain level:  0-3/10     SUBJECTIVE:  See eval    OBJECTIVE:   OBJECTIVE:       7/15/21 deferred  Flexibility L R Comment   Hamstring         Gastroc         ITB         Quad                      7/15/21  ROM PROM AROM Overpressure Comment     L R L R L R     Flexion      122 heel slide   75 tight/ painful         Extension     +3 +3                                                7/15/21  Strength L R Comment   Quad 4 3-  Poor VMO QS   Hamstring         Gastroc         Hip  flexion         Hip abd         Hip Ext         Hip IR         Hip ER            7/15/21  Special Test Results/Comment   Patellar Apprehension NT   Cavazos's Grind Test NT   Tejas's Unable to test due to lack of ROM   Thessaly's NT   Joint Line Tenderness (+) MJL   Valgus Laxity (-)   Varus Laxity (-)   Lachmans (+) on R   Drop Back NT   BELA NT   FADDIR NT   Scour NT      7/15/21  Girth L R   Mid Patella 46 46   Suprapatellar 50 48.5   5cm above 58 52.0   15cm above 68 65.5      Reflexes/Sensation: NT              []?Dermatomes/Myotomes intact               []?Reflexes equal and normal bilaterally              []?Other:     Joint mobility:                [x]? Normal: good patellar mobility                      []?Hypo              []?Hyper     Palpation: Very TTP at MJL, pes anserine, and infrapatellar fat pads; no LJL tenderness; mild IT Band and gerdy's tubercle tenderness     Functional Mobility/Transfers: ind but has difficulty getting on/off table     Posture: mild varus     Bandages/Dressings/Incisions: n/a     Gait: (include devices/WB status) Antalgic; no AD used today --> discussed with patient using cruch(es) until able to walk without a limp     Orthopedic Special Tests: see above. Functional testing deferred.        RESTRICTIONS/PRECAUTIONS: ACL Tear / Medial meniscus tear / tibial plateau fracture  Right knee; also on eliquis for blood clot  Exercises/Interventions:  Exercise/Equipment Resistance/Repetitions Other comments   Stretching       Hamstring 5x30\"    Hip Flexor     ITB     Figure 4     Quad     Inclined Calf     Towel Pull 5x30\"            ROM       EOB Self-Assist     Sheet Pull 10x10\"    Wall Slide     Manual     Biodex Passive     Recumbent Bike     ERMI     Hang Weights     Ankle Pumps x30 Black TB            Patellar Glides       Medial       Superior       Inferior               Isometrics       Quad sets [id number]\" A/S     Add sets              SLR       Supine 3x5    Prone     Abduction     Adducton     SLR+               Glutes       Bridges     Supine Clams     S/L Clams     Side Stepping       Monster walks               CKC       Weight Shift     Calf raises Wall sits     Step ups       Squatting     CC TKE     SL DL               PRE       Extension  RANGE: 90-30   Flexion  RANGE: Avail   Leg Press   RANGE: 80-10   Cable Column               Balance       Tandem Stance     Tilt board       SLS      Biodex balance               Other       Treadmill       Gait Training          Manual Interventions          Patient have access to gym? [] Yes  [] No  Patient have equipment at home? [] Yes  [] No       Patient Education:   7/15/21: Patient educated about PT diagnosis, prognosis, and plan of care; educated on role of physical therapy; educated on HEP; educated on anatomy of knee joint; discussed role of PT for ACL tear rehab; discussed rest/ice/elevation for swelling and symptom management; discussed using crutch(es) for ambulation until quad strength improves and she can walk without a limp. HEP:  Patient instructed on HEP on this date with handout provided and all questions answered. Discussions about how to progress sets/reps/resistance as necessary for fatigue and challenge. Patient was instructed to contact PT with any questions or concerns about HEP moving forward. Patient verbally stated she/he understood. Access Code: [email or web address]  URL: [email or web address]  Date: 07/15/2021  Prepared by:  Abram Foss    Exercises  Long Sitting Calf Stretch with Strap - 3 x daily - 7 x weekly - 5 reps - 30\" hold  Seated Table Hamstring Stretch - 3 x daily - 7 x weekly - 5 reps - 30\" hold  Long Sitting Quad Set - 3 x daily - 7 x weekly - 10 sets - 10\" hold  Active Straight Leg Raise with Quad Set - 3 x daily - 7 x weekly - 3 sets - 10 reps  Sitting Heel Slide with Towel - 3 x daily - 7 x weekly - 10 reps - 10 hold  Long Sitting Ankle Plantar Flexion with Resistance - 3 x daily - 7 x weekly - 3 sets - 10 reps      Therapeutic Exercise and NMR EXR  [x] (00284) Provided verbal/tactile cueing for activities related to strengthening, flexibility, endurance, ROM for improvements in LE, proximal hip, and core control with self care, mobility, lifting, ambulation. [x] (54364) Provided verbal/tactile cueing for activities related to improving balance, coordination, kinesthetic sense, posture, motor skill, proprioception  to assist with LE, proximal hip, and core control in self care, mobility, lifting, ambulation and eccentric single leg control. NMR and Therapeutic Activities:    [x] (73296 or 18706) Provided verbal/tactile cueing for activities related to improving balance, coordination, kinesthetic sense, posture, motor skill, proprioception and motor activation to allow for proper function of core, proximal hip and LE with self care and ADLs  [] (04192) Gait Re-education- Provided training and instruction to the patient for proper LE, core and proximal hip recruitment and positioning and eccentric body weight control with ambulation re-education including up and down stairs     Home Exercise Program:    [x] (55660) Reviewed/Progressed HEP activities related to strengthening, flexibility, endurance, ROM of core, proximal hip and LE for functional self-care, mobility, lifting and ambulation/stair navigation   [] (35533)Reviewed/Progressed HEP activities related to improving balance, coordination, kinesthetic sense, posture, motor skill, proprioception of core, proximal hip and LE for self care, mobility, lifting, and ambulation/stair navigation      Manual Treatments:  PROM / STM / Oscillations-Mobs:  G-I, II, III, IV (PA's, Inf., Post.)  [x] (09928) Provided manual therapy to mobilize LE, proximal hip and/or LS spine soft tissue/joints for the purpose of modulating pain, promoting relaxation,  increasing ROM, reducing/eliminating soft tissue swelling/inflammation/restriction, improving soft tissue extensibility and allowing for proper ROM for normal function with self care, mobility, lifting and ambulation.      Modalities:  Vaso 15'    Charges:  Timed Code Treatment Treatment Progress Update:  [] Patient is progressing as expected towards functional goals listed. [] Progression is slowed due to complexities/Impairments listed. [] Progression has been slowed due to co-morbidities. [x] Plan just implemented, too soon to assess goals progression <30days   [] Goals require adjustment due to lack of progress  [] Patient is not progressing as expected and requires additional follow up with physician  [] Other    Prognosis for POC: [x] Good [] Fair  [] Poor      Patient requires continued skilled intervention: [x] Yes  [] No    Treatment/Activity Tolerance:  [x] Patient able to complete treatment  [] Patient limited by fatigue  [] Patient limited by pain     [] Patient limited by other medical complications  [] Other:       PLAN: See eval  [] Continue per plan of care [] Alter current plan (see comments above)  [x] Plan of care initiated [] Hold pending MD visit [] Discharge      Electronically signed by:  Sushma Regalado, PT, DPT, OCS    Note: If patient does not return for scheduled/ recommended follow up visits, this note will serve as a discharge from care along with most recent update on progress.

## 2021-07-15 NOTE — PLAN OF CARE
[facility] [address] [place] [address]                                                         Physical Therapy Certification    Dear Referring Practitioner: Dr. Fozia Conroy,    We had the pleasure of evaluating the following patient for physical therapy services at [address] [place] [address]. A summary of our findings can be found in the initial assessment below. This includes our plan of care. If you have any questions or concerns regarding these findings, please do not hesitate to contact me at the office phone number checked above. Thank you for the referral.       Physician Signature:_______________________________Date:__________________  By signing above (or electronic signature), therapists plan is approved by physician      Patient: Ru Muniz   : 1980   MRN: [id number]  Referring Physician: Referring Practitioner: Dr. Fozia Conroy      Evaluation Date: 7/15/2021      Medical Diagnosis Information:  Diagnosis: M25.561 (ICD-10-CM) - Acute pain of right knee   Treatment Diagnosis: M25.561 R Knee Pain; M25.361 R Knee Instability; M62.559 Atrophy of unspecified thigh; Insurance information: PT Insurance Information: [facility]; 30 PT visits; auth required     Precautions/ Contra-indications: HTN (medicated); mild stroke; Epilepsy;     C-SSRS Triggered by Intake questionnaire (Past 2 wk assessment):   [x] No, Questionnaire did not trigger screening.   [] Yes, Patient intake triggered further evaluation      [] C-SSRS Screening completed  [] PCP notified via Plan of Care  [] Emergency services notified     Latex Allergy:  [x]NO      []YES  Preferred Language for Healthcare:   [x]English       []other:    SUBJECTIVE: Patient stated complaint:38year old female presents to PT with right knee injury. Slipped off step in  and fell with deep flexed knee.  Had immediate pain and swelling along with inability to bear any weight. Went to ED and eventually a second doctor's office and imaging taken showing  chronic ACL and medial meniscus tear as well as medial tibial plateau fracture. Had a history of knee pain prior to all this that she was in PT for a year ago. Also developed a blood clot 2 weeks ago and has been on Eliquis - this is managed now by her PCP with follow up appointments coming in the next few weeks. Today, her primary complaint is pain at night when trying to sleep along medial side of knee. She is a side sleeper and tries to put a pillow in-between to help. Also has difficulty walking but only mild pain and instability with walking down stairs. Stopped using crutches this week after her MD appointment. Relevant Medical History:no previous knee surgeries or injuries  Functional Disability Index:   OUTCOME MEASURE DATE DEFICIT   WOMAC 7/15/21 79% Deficit          Pain Scale: 0/10 at rest, 2-3/10 at worst in recent days  Easing factors: Tylenol;  Ice  Provocative factors: Stair climbing; walking; sleeping     Type: []Constant   [x]Intermittent  []Radiating [x]Localized []other:     Numbness/Tingling: Tingling behind knee at night time    Occupation/School: [employer] at [employer] - currently doing seated duties    Living Status/Prior Level of Function: Independent with ADLs and IADLs; needs to get back to doing house duties    OBJECTIVE:      7/15/21 deferred  Flexibility L R Comment   Hamstring      Gastroc      ITB      Quad              7/15/21  ROM PROM AROM Overpressure Comment    L R L R L R    Flexion    75 tight/ painful      Extension   +3 +3                            7/15/21  Strength L R Comment   Quad 4 3-  Poor VMO QS   Hamstring      Gastroc      Hip  flexion      Hip abd      Hip Ext      Hip IR      Hip ER        7/15/21  Special Test Results/Comment   Patellar Apprehension NT   Dirk's Grind Test NT   Tejas's Unable to test due to lack of ROM Thessaly's NT   Joint Line Tenderness (+) MJL   Valgus Laxity (-)   Varus Laxity (-)   Lachmans (+) on R   Drop Back NT   BELA NT   FADDIR NT   Scour NT     7/15/21  Girth L R   Mid Patella 46 46   Suprapatellar 50 48.5   5cm above 58 52.0   15cm above 68 65.5     Reflexes/Sensation: NT   []Dermatomes/Myotomes intact    []Reflexes equal and normal bilaterally   []Other:    Joint mobility:     [x]Normal: good patellar mobility    []Hypo   []Hyper    Palpation: Very TTP at MJL, pes anserine, and infrapatellar fat pads; no LJL tenderness; mild IT Band and gerdy's tubercle tenderness    Functional Mobility/Transfers: ind but has difficulty getting on/off table    Posture: mild varus    Bandages/Dressings/Incisions: n/a    Gait: (include devices/WB status) Antalgic; no AD used today --> discussed with patient using cruch(es) until able to walk without a limp    Orthopedic Special Tests: see above. Functional testing deferred. [x] Patient history, allergies, meds reviewed. Medical chart reviewed. See intake form. Review Of Systems (ROS):  [x]Performed Review of systems (Integumentary, CardioPulmonary, Neurological) by intake and observation. Intake form has been scanned into medical record. Patient has been instructed to contact their primary care physician regarding ROS issues if not already being addressed at this time.       Co-morbidities/Complexities (which will affect course of rehabilitation):   []None           Arthritic conditions   []Rheumatoid arthritis (M05.9)  []Osteoarthritis (M19.91)   Cardiovascular conditions   [x]Hypertension (I10)  []Hyperlipidemia (E78.5)  []Angina pectoris (I20)  []Atherosclerosis (I70)   Musculoskeletal conditions   []Disc pathology   []Congenital spine pathologies   []Prior surgical intervention  []Osteoporosis (M81.8)  []Osteopenia (M85.8)   Endocrine conditions   []Hypothyroid (E03.9)  []Hyperthyroid Gastrointestinal conditions   []Constipation (C76.96)   Metabolic conditions   []Morbid obesity (E66.01)  []Diabetes type 1(E10.65) or 2 (E11.65)   []Neuropathy (G60.9)     Pulmonary conditions   []Asthma (J45)  []Coughing   []COPD (J44.9)   Psychological Disorders  []Anxiety (F41.9)  []Depression (F32.9)   []Other:   [x]Other: Chronic ACL tear; Epilepsy; currently on blood thinners          Barriers to/and or personal factors that will affect rehab potential:              [x]Age  []Sex              [x]Motivation/Lack of Motivation                        [x]Co-Morbidities              []Cognitive Function, education/learning barriers              []Environmental, home barriers              []profession/work barriers  [x]past PT/medical experience  []other:  Justification: Motivated to return to PLOF; has chronic ACL tear which may limit her in rehab initially; Falls Risk Assessment (30 days):   [x] Falls Risk assessed and no intervention required.   [] Falls Risk assessed and Patient requires intervention due to being higher risk   TUG score (>12s at risk):     [] Falls education provided, including       G-Codes:     OUTCOME MEASURE DATE [facility] 7/15/21 79% Deficit          ASSESSMENT:   Functional Impairments:     []Noted lumbar/proximal hip/LE hypomobility   [x]Decreased LE functional ROM   [x]Decreased core/proximal hip strength and neuromuscular control   [x]Decreased LE functional strength   [x]Reduced balance/proprioceptive control   []other:      Functional Activity Limitations (from functional questionnaire and intake)   [x]Reduced ability to tolerate prolonged functional positions   [x]Reduced ability or difficulty with changes of positions or transfers between positions   [x]Reduced ability to maintain good posture and demonstrate good body mechanics with sitting, bending, and lifting   [x]Reduced ability to sleep   [x] Reduced ability or tolerance with driving and/or computer work   [x]Reduced ability to perform lifting, carrying tasks   [x]Reduced ability to squat   []Reduced ability to forward bend   [x]Reduced ability to ambulate prolonged functional periods/distances/surfaces   [x]Reduced ability to ascend/descend stairs   [x]Reduced ability to run, hop or jump   []other:     Participation Restrictions   [x]Reduced participation in self care activities   [x]Reduced participation in home management activities   [x]Reduced participation in work activities   [x]Reduced participation in social activities. []Reduced participation in sport activities. Classification :    []Signs/symptoms consistent with post-surgical status including decreased ROM, strength and function.    []Signs/symptoms consistent with joint sprain/strain   []Signs/symptoms consistent with patella-femoral syndrome   []Signs/symptoms consistent with knee OA/hip OA   [x]Signs/symptoms consistent with internal derangement of knee/Hip   []Signs/symptoms consistent with functional hip weakness/NMR control      []Signs/symptoms consistent with tendinitis/tendinosis    []signs/symptoms consistent with pathology which may benefit from Dry needling      []other:      Prognosis/Rehab Potential:      []Excellent   []Good    [x]Fair   []Poor    Tolerance of evaluation/treatment:    []Excellent   [x]Good    []Fair   []Poor    Physical Therapy Evaluation Complexity Justification  [x] A history of present problem with:  [] no personal factors and/or comorbidities that impact the plan of care;  []1-2 personal factors and/or comorbidities that impact the plan of care  [x]3 personal factors and/or comorbidities that impact the plan of care  [x] An examination of body systems using standardized tests and measures addressing any of the following: body structures and functions (impairments), activity limitations, and/or participation restrictions;:  [] a total of 1-2 or more elements   [] a total of 3 or more elements   [x] a total of 4 or more elements   [x] A clinical presentation with:  [] stable and/or uncomplicated characteristics   [x] evolving clinical presentation with changing characteristics  [] unstable and unpredictable characteristics;   [x] Clinical decision making of [] low, [x] moderate, [] high complexity using standardized patient assessment instrument and/or measurable assessment of functional outcome. [] EVAL (LOW) 45220 (typically 20 minutes face-to-face)  [x] EVAL (MOD) 48217 (typically 30 minutes face-to-face)  [] EVAL (HIGH) 57211 (typically 45 minutes face-to-face)  [] RE-EVAL       PLAN  Frequency/Duration:  2 days per week for 12 Weeks:  Interventions:  [x]  Therapeutic exercise including: strength training, ROM, for Lower extremity and core   [x]  NMR activation and proprioception for LE, Glutes and Core   [x]  Manual therapy as indicated for LE, Hip and spine to include: Dry Needling/IASTM, STM, PROM, Gr I-IV mobilizations, manipulation. [x] Modalities as needed that may include: thermal agents, E-stim, Biofeedback, US, iontophoresis as indicated  [x] Patient education on joint protection, postural re-education, activity modification, progression of HEP. HEP instruction:   Patient instructed on HEP on this date with handout provided and all questions answered. Discussions about how to progress sets/reps/resistance as necessary for fatigue and challenge. Patient was instructed to contact PT with any questions or concerns about HEP moving forward. Patient verbally stated she/he understood. Access Code: [email or web address]  URL: [email or web address]  Date: 07/15/2021  Prepared by:  Abram Foss    Exercises  Long Sitting Calf Stretch with Strap - 3 x daily - 7 x weekly - 5 reps - 30\" hold  Seated Table Hamstring Stretch - 3 x daily - 7 x weekly - 5 reps - 30\" hold  Long Sitting Quad Set - 3 x daily - 7 x weekly - 10 sets - 10\" hold  Active Straight Leg Raise with Quad Set - 3 x daily - 7 x weekly - 3 sets - 10 reps  Sitting Heel Slide with Towel - 3 x daily - 7 x weekly - 10 reps - 10 hold  Long Sitting Ankle Plantar Flexion with Resistance - 3 x daily - 7 x weekly - 3 sets - 10 reps      GOALS:   Patient stated goal: Return to full work duties    [] Progressing: [] Met: [] Not Met: [] Adjusted    Therapist goals for Patient:   Short Term Goals: To be achieved in: 2 weeks  1. Independent in HEP and progression per patient tolerance, in order to prevent re-injury. [] Progressing: [] Met: [] Not Met: [] Adjusted   2. Patient will have a decrease in pain to facilitate improvement in movement, function, and ADLs as indicated by Functional Deficits. [] Progressing: [] Met: [] Not Met: [] Adjusted    Long Term Goals: To be achieved in: 12 weeks  1. Disability index score of 39.5% or less for the [facility] to assist with reaching prior level of function. [] Progressing: [] Met: [] Not Met: [] Adjusted  2. Patient will demonstrate increased AROM to 0-120 to allow for proper joint functioning as indicated by patients Functional Deficits. [] Progressing: [] Met: [] Not Met: [] Adjusted  3. Patient will demonstrate an increase in Strength to 4+/5 in BLE to allow for proper functional mobility as indicated by patients Functional Deficits. [] Progressing: [] Met: [] Not Met: [] Adjusted  4. Patient will return to ADL and other functional activities without increased symptoms or restriction. [] Progressing: [] Met: [] Not Met: [] Adjusted  5. Patient will ascend/descend a flight of stairs independently without pain or giving away in knee. (patient specific functional goal)    [] Progressing: [] Met: [] Not Met: [] Adjusted       Electronically signed by:  Calin Beth, PT, DPT, OCS    Note: If patient does not return for scheduled/ recommended follow up visits, this note will serve as a discharge from care along with most recent update on progress.

## 2021-08-03 ENCOUNTER — HOSPITAL ENCOUNTER (OUTPATIENT)
Dept: PHYSICAL THERAPY | Age: 41
Setting detail: THERAPIES SERIES
Discharge: HOME | End: 2021-08-03
Payer: COMMERCIAL

## 2021-08-03 NOTE — FLOWSHEET NOTE
[address] [facility], [place]    Physical Therapy  Cancellation/No-show Note  Patient Name:  Isadora Singh  :  1980   Date:  8/3/2021  Cancelled visits to date: 1  No-shows to date: 0    For today's appointment patient:  [x]  Cancelled  [x]  Rescheduled appointment  []  No-show     Reason given by patient:  []  Patient ill  []  Conflicting appointment   []  No transportation    []  Conflict with work  []  No reason given  [x]  Other:  Going to hospital for testing - called to cancel; will call back to setup more appointments   Comments:      Electronically signed by:  Anabel Sandhu, PT, DPT, OCS

## 2021-08-31 ENCOUNTER — HOSPITAL ENCOUNTER (OUTPATIENT)
Dept: PHYSICAL THERAPY | Age: 41
Setting detail: THERAPIES SERIES
Discharge: HOME | End: 2021-08-31
Payer: COMMERCIAL

## 2021-08-31 PROCEDURE — 97110 THERAPEUTIC EXERCISES: CPT

## 2021-08-31 PROCEDURE — 97530 THERAPEUTIC ACTIVITIES: CPT

## 2021-08-31 PROCEDURE — 97112 NEUROMUSCULAR REEDUCATION: CPT

## 2021-08-31 NOTE — PROGRESS NOTES
[address] [facility], [place]     Physical Therapy Progress Note          Overall Response to Treatment:   [x]Patient is responding well to treatment and improvement is noted with regards  to goals   []Patient should continue to improve in reasonable time if they continue HEP   []Patient has plateaued and is no longer responding to skilled PT intervention    []Patient is getting worse and would benefit from return to referring MD   []Patient unable to adhere to initial POC   [x]Other: Has not been in PT in 6 weeks due to insurance approval issues as well as other medical issues. Despite this, she has shown near full return of PROM in knee and has significantly less pain and tenderness around knee. Remains tender only at her MJL today. Quad activation slightly improved but still functionally has significant weakness. She ambulates with hyperextension gait in stance phase to avoid quad activation. When corrected today, patient noted quad fatigue but significantly less instability in gait. She does have fluid in her knee so she was encouraged to setup follow up appointment with Dr. Mckayla Browne as she has not seen him in nearly two months. Patient continues to need physical therapy to return full strength, endurance, and neuromuscular control to her RLE in order to reduce pain and instability and allow her a safe and fully return to PLOF. Date range of Visits: 7/15/21-8/3/21  Total Visits: 2      Physical Therapy Daily Treatment Note  Date:  2021    Patient Name:  Adelfo Cody"  :  1980  MRN: [id number]  Restrictions/Precautions:    Medical/Treatment Diagnosis Information:  · Diagnosis: M25.561 (ICD-10-CM) - Acute pain of right knee  · Treatment Diagnosis: M25.561 R Knee Pain; M25.361 R Knee Instability; M62.559 Atrophy of unspecified thigh;   Insurance/Certification information:  PT Insurance Information: [employer]; 30 PT visits; auth required  Physician Information:  Referring Practitioner: Dr. Mckayla Browne  Has the plan of care been signed (Y/N):        [x]  Yes  []  No     Date of Patient follow up with Physician: not scheduled      Is this a Progress Report:     [x]  Yes  []  No        If Yes:  Date Range for reporting period:  Beginnin/15/21  Endin21    Progress report will be due (10 Rx or 30 days whichever is less):       Recertification will be due (POC Duration  / 90 days whichever is less): 10/15/21         Visit # Insurance Allowable Auth Required   2 30 [facility]    Approved thru 10/15/21 [x]  Yes []  No      OUTCOME MEASURE DATE DEFICIT   [facility] 7/15/21 79% Deficit   [facility] 21 58% Deficit            Latex Allergy:  [x]NO      []YES  Preferred Language for Healthcare:   [x]English       []other:    Pain level:  0/10 at rest, 5/10 at worst (Prolonged walking);     SUBJECTIVE:  Presents to first PT visit in 6 weeks due to insurance issues and other medical issues. Reports compliance with HEP daily. Still complaints of fluid in knee causing difficulty in her knee flexion ROM. Also gets medial knee pain and posterior knee \"pulling\" with prolonged walking. Now off her elliquis for DVTs. Has had no pain or swelling in calf or lower leg. Uses single crutch most of the time. Has had a few moments of knee giving out on her and her knee often feels unstable.       OBJECTIVE:        7/15/21 deferred  Flexibility L R Comment   Hamstring         Gastroc         ITB         Quad                      21  ROM PROM AROM Overpressure Comment     L R L R L R     Flexion     135 heel slide   105 \"Tight\"  140       Extension     +3 +3                                                21  Strength L R Comment   Quad 4 3  Poor+ VMO QS   Hamstring         Gastroc         Hip  flexion         Hip abd         Hip Ext         Hip IR         Hip ER            7/15/21  Special Test Results/Comment   Patellar Apprehension NT   Cavazos's Grind Test NT   Tejas's Unable to test due to lack of ROM   Thesscortez's NT   Joint Line Tenderness (+) MJL   Valgus Laxity (-)   Varus Laxity (-)   Lachmans (+) on R   Drop Back NT   BELA NT   FADDIR NT   Scour NT      7/15/21  Girth L R   Mid Patella 46 46   Suprapatellar 50 48.5   5cm above 58 52.0   15cm above 68 65.5      Reflexes/Sensation: NT              []?Dermatomes/Myotomes intact               []?Reflexes equal and normal bilaterally              []?Other:     Joint mobility:                [x]? Normal: good patellar mobility                      []?Hypo              []?Hyper     Palpation: Moderate TTP at MJL; no TTP at pes anserine infrapatellar fat pads,  LJL tenderness, IT Band and gerdy's tubercle tenderness 8/31/21     Functional Mobility/Transfers: ind but has difficulty getting on/off table     Posture: mild varus     Bandages/Dressings/Incisions: n/a     Gait: (include devices/WB status) Antalgic; no AD used today --> discussed with patient using cruch(es) until able to walk without a limp     Orthopedic Special Tests: see above. Functional testing deferred.        RESTRICTIONS/PRECAUTIONS: ACL Tear / Medial meniscus tear / tibial plateau fracture  Right knee; also on eliquis for blood clot  Exercises/Interventions:  Exercise/Equipment Resistance/Repetitions Other comments   Stretching       Hamstring 3x30\"    Hip Flexor     ITB     Figure 4     Quad     Inclined Calf 3x30\"               ROM       EOB Self-Assist     Sheet Pull 10x10\"    Wall Slide     Manual     Biodex Passive     Recumbent Bike     ERMI     Hang Weights                Patellar Glides       Medial       Superior       Inferior               Isometrics       Quad sets [id number]\" A/S     Add sets              SLR       Supine 3x5    Prone     Abduction     Adducton     SLR+               Glutes       Bridges     Supine Clams     S/L Clams     Side Stepping       Monster walks               CKC       Weight Shift     Calf raises 3x10 BIodex %WBing   Wall sits 5x15\"    Step ups       Squatting     CC TKE 2x10x5\" holds 3pl   SL DL               PRE       Extension  RANGE: 90-30   Flexion  RANGE: Avail   Leg Press   RANGE: 80-10   Cable Column               Balance       Tandem Stance     Tilt board       SLS 5x10\"     Biodex balance               Other       Treadmill       Gait Training 5' In clinic; emphasis on not hyperextending knee in stance phase of gait        Manual Interventions          Patient have access to gym? [] Yes  [] No  Patient have equipment at home? [] Yes  [] No       Patient Education:   7/15/21: Patient educated about PT diagnosis, prognosis, and plan of care; educated on role of physical therapy; educated on HEP; educated on anatomy of knee joint; discussed role of PT for ACL tear rehab; discussed rest/ice/elevation for swelling and symptom management; discussed using crutch(es) for ambulation until quad strength improves and she can walk without a limp. HEP:  Patient instructed on HEP on this date with handout provided and all questions answered. Discussions about how to progress sets/reps/resistance as necessary for fatigue and challenge. Patient was instructed to contact PT with any questions or concerns about HEP moving forward. Patient verbally stated she/he understood. Access Code: [email or web address]  URL: [email or web address]  Date: 08/31/2021  Prepared by:  Abram Foss    Exercises  Sitting Heel Slide with Towel - 3 x daily - 7 x weekly - 10 reps - 10 hold  Standing Gastroc Stretch on Step - 2 x daily - 7 x weekly - 3 reps - 30\" hold  Seated Table Hamstring Stretch - 2 x daily - 7 x weekly - 5 reps - 30\" hold  Long Sitting Quad Set - 10 x daily - 7 x weekly - 10 sets - 10\" hold  Active Straight Leg Raise with Quad Set - 1 x daily - 7 x weekly - 3 sets - 10 reps  Standing Heel Raise - 1 x daily - 7 x weekly - 3 sets - 10 reps  Standing Terminal Knee Extension with Resistance - 1 x daily - 7 x weekly - 3 sets - 10 reps - 5 hold  Wall Quarter Squat - 1 x daily - 7 x weekly - 15\" hold  Single Leg Stance - 1 x daily - 7 x weekly - 5 sets - 10 hold      Therapeutic Exercise and NMR EXR  [x] (19455) Provided verbal/tactile cueing for activities related to strengthening, flexibility, endurance, ROM for improvements in LE, proximal hip, and core control with self care, mobility, lifting, ambulation. [x] (28697) Provided verbal/tactile cueing for activities related to improving balance, coordination, kinesthetic sense, posture, motor skill, proprioception  to assist with LE, proximal hip, and core control in self care, mobility, lifting, ambulation and eccentric single leg control.      NMR and Therapeutic Activities:    [x] (86006 or 29111) Provided verbal/tactile cueing for activities related to improving balance, coordination, kinesthetic sense, posture, motor skill, proprioception and motor activation to allow for proper function of core, proximal hip and LE with self care and ADLs  [] (99567) Gait Re-education- Provided training and instruction to the patient for proper LE, core and proximal hip recruitment and positioning and eccentric body weight control with ambulation re-education including up and down stairs     Home Exercise Program:    [x] (54863) Reviewed/Progressed HEP activities related to strengthening, flexibility, endurance, ROM of core, proximal hip and LE for functional self-care, mobility, lifting and ambulation/stair navigation   [] (35142)Reviewed/Progressed HEP activities related to improving balance, coordination, kinesthetic sense, posture, motor skill, proprioception of core, proximal hip and LE for self care, mobility, lifting, and ambulation/stair navigation      Manual Treatments:  PROM / STM / Oscillations-Mobs:  G-I, II, III, IV (PA's, Inf., Post.)  [x] (33943) Provided manual therapy to mobilize LE, proximal hip and/or LS spine soft tissue/joints for the purpose of modulating pain, promoting relaxation,  increasing ROM, reducing/eliminating soft tissue swelling/inflammation/restriction, improving soft tissue extensibility and allowing for proper ROM for normal function with self care, mobility, lifting and ambulation. Modalities: Declined - discussed ice for home    Charges:  Timed Code Treatment Minutes: 55   Total Treatment Minutes: 55   425-527    [] EVAL (LOW) 50373 (typically 20 minutes face-to-face)  [] EVAL (MOD) 93978 (typically 30 minutes face-to-face)  [] EVAL (HIGH) 22504 (typically 45 minutes face-to-face)  [] RE-EVAL   [x] TO(82621) x  2   [] IONTO  [x] NMR (09237) x   1  [] VASO  [] Manual (49965) x      [] Other:  [x] TA x   1   [] Mech Traction (14293)  [] ES(attended) (38009)      [] ES (un) (94015):     GOALS:   Patient stated goal: Return to full work duties    [] Progressing: [] Met: [] Not Met: [] Adjusted    Therapist goals for Patient:   Short Term Goals: To be achieved in: 2 weeks  1. Independent in HEP and progression per patient tolerance, in order to prevent re-injury. [] Progressing: [] Met: [] Not Met: [] Adjusted   2. Patient will have a decrease in pain to facilitate improvement in movement, function, and ADLs as indicated by Functional Deficits. [] Progressing: [] Met: [] Not Met: [] Adjusted    Long Term Goals: To be achieved in: 12 weeks  1. Disability index score of 39.5% or less for the [facility] to assist with reaching prior level of function. [] Progressing: [] Met: [] Not Met: [] Adjusted  2. Patient will demonstrate increased AROM to 0-120 to allow for proper joint functioning as indicated by patients Functional Deficits. [] Progressing: [] Met: [] Not Met: [] Adjusted  3. Patient will demonstrate an increase in Strength to 4+/5 in BLE to allow for proper functional mobility as indicated by patients Functional Deficits. [] Progressing: [] Met: [] Not Met: [] Adjusted  4. Patient will return to ADL and other functional activities without increased symptoms or restriction.    [] Progressing: [] Met: [] Not Met: [] Adjusted  5. Patient will ascend/descend a flight of stairs independently without pain or giving away in knee. (patient specific functional goal)    [] Progressing: [] Met: [] Not Met: [] Adjusted     Overall Progression Towards Functional goals/ Treatment Progress Update:  [] Patient is progressing as expected towards functional goals listed. [] Progression is slowed due to complexities/Impairments listed. [] Progression has been slowed due to co-morbidities. [x] Plan just implemented, too soon to assess goals progression <30days   [] Goals require adjustment due to lack of progress  [] Patient is not progressing as expected and requires additional follow up with physician  [] Other    Prognosis for POC: [x] Good [] Fair  [] Poor      Patient requires continued skilled intervention: [x] Yes  [] No    Treatment/Activity Tolerance:  [x] Patient able to complete treatment  [] Patient limited by fatigue  [] Patient limited by pain     [] Patient limited by other medical complications  [] Other:       PLAN: See eval  [] Continue per plan of care [] Alter current plan (see comments above)  [x] Plan of care initiated [] Hold pending MD visit [] Discharge      Electronically signed by:  Selvin Lao, PT, DPT, OCS    Note: If patient does not return for scheduled/ recommended follow up visits, this note will serve as a discharge from care along with most recent update on progress.

## 2021-09-02 ENCOUNTER — HOSPITAL ENCOUNTER (OUTPATIENT)
Dept: PHYSICAL THERAPY | Age: 41
Setting detail: THERAPIES SERIES
Discharge: HOME | End: 2021-09-02
Payer: COMMERCIAL

## 2021-09-02 PROCEDURE — 97110 THERAPEUTIC EXERCISES: CPT

## 2021-09-02 PROCEDURE — 97112 NEUROMUSCULAR REEDUCATION: CPT

## 2021-09-02 PROCEDURE — 97530 THERAPEUTIC ACTIVITIES: CPT

## 2021-09-02 NOTE — FLOWSHEET NOTE
[address] [facility], [place]        Physical Therapy Daily Treatment Note  Date:  2021    Patient Name:  Zeus Jimenez"  :  1980  MRN: [id number]  Restrictions/Precautions:    Medical/Treatment Diagnosis Information:  · Diagnosis: M25.561 (ICD-10-CM) - Acute pain of right knee  · Treatment Diagnosis: M25.561 R Knee Pain; M25.361 R Knee Instability; M62.559 Atrophy of unspecified thigh; Insurance/Certification information:  [facility] Information[facility]; [facility] PT visits; auth required  Physician Information:  Referring Practitioner: Dr. Earnest Adame  Has the plan of care been signed (Y/N):        [x]  Yes  []  No     Date of Patient follow up with Physician: 21      Is this a Progress Report:     []  Yes  [x]  No        If Yes:  Date Range for reporting period:  Beginnin/15/21  Ending:     Progress report will be due (10 Rx or 30 days whichever is less):       Recertification will be due (POC Duration  / 90 days whichever is less): 10/15/21         Visit # Insurance Allowable Auth Required   3 30 [facility]    Approved thru 10/15/21 [x]  Yes []  No      OUTCOME MEASURE DATE DEFICIT   [facility] 7/15/21 79% Deficit   [facility] 21 58% Deficit            Latex Allergy:  [x]NO      []YES  Preferred Language for Healthcare:   [x]English       []other:    Pain level:  0/10 at rest, 5/10 at worst (Prolonged walking);     SUBJECTIVE:  Doing well today. No pain or soreness after last PT visit. Has been practicing her walking as instructed and feels more confident. Scheduled follow up with Dr. Earnest Adame for next week.         OBJECTIVE:        7/15/21 deferred  Flexibility L R Comment   Hamstring         Gastroc         ITB         Quad                      21  ROM PROM AROM Overpressure Comment     L R L R L R     Flexion     135 heel slide   105 \"Tight\"  140       Extension     +3 +3                                                21  Strength L R Comment Quad 4 3  Poor+ VMO QS   Hamstring         Gastroc         Hip  flexion         Hip abd         Hip Ext         Hip IR         Hip ER            7/15/21  Special Test Results/Comment   Patellar Apprehension NT   Cavazos's Grind Test NT   Tejas's Unable to test due to lack of ROM   Thesscortez's NT   Joint Line Tenderness (+) MJL   Valgus Laxity (-)   Varus Laxity (-)   Lachmans (+) on R   Drop Back NT   BELA NT   FADDIR NT   Scour NT      7/15/21  Girth L R   Mid Patella 46 46   Suprapatellar 50 48.5   5cm above 58 52.0   15cm above 68 65.5      Reflexes/Sensation: NT              []?Dermatomes/Myotomes intact               []?Reflexes equal and normal bilaterally              []?Other:     Joint mobility:                [x]? Normal: good patellar mobility                      []?Hypo              []?Hyper     Palpation: Moderate TTP at MJL; no TTP at pes anserine infrapatellar fat pads,  LJL tenderness, IT Band and gerdy's tubercle tenderness 8/31/21     Functional Mobility/Transfers: ind but has difficulty getting on/off table     Posture: mild varus     Bandages/Dressings/Incisions: n/a     Gait: (include devices/WB status) Antalgic; no AD used today --> discussed with patient using cruch(es) until able to walk without a limp     Orthopedic Special Tests: see above. Functional testing deferred.        RESTRICTIONS/PRECAUTIONS: ACL Tear / Medial meniscus tear / tibial plateau fracture  Right knee; also on eliquis for blood clot  Exercises/Interventions:  Exercise/Equipment Resistance/Repetitions Other comments   Stretching       Hamstring 3x30\"    Hip Flexor     ITB     Figure 4     Quad     Inclined Calf 3x30\"               ROM       EOB Self-Assist     Sheet Pull 10x10\"    Wall Slide     Manual     Biodex Passive     Recumbent Bike 5' Level 3 Seat 10   ERMI     Hang Weights                Patellar Glides       Medial       Superior       Inferior               Isometrics       Quad sets [id number]\" A/S     Add sets SLR       Supine 3x10 2#   Prone     Abduction 3x10 2#   Adducton     SLR+               Glutes       Bridges     Supine Clams     S/L Clams 2x10 each    Side Stepping       Monster walks               CKC       Weight Shift     Calf raises 3x10 BIodex %WBing   Wall sits 5x15\"    Step ups       Squatting     CC TKE 2x10x5\" holds 3pl   SL DL               PRE       Extension  RANGE: 90-30   Flexion  RANGE: Avail   Leg Press   RANGE: 80-10   Cable Column               Balance       Tandem Stance     Tilt board       SLS 5x10\"     Biodex balance PS L12 3'             Other       Treadmill       Gait Training 5' In clinic; emphasis on not hyperextending knee in stance phase of gait        Manual Interventions          Patient have access to gym? [] Yes  [] No  Patient have equipment at home? [] Yes  [] No       Patient Education:   7/15/21: Patient educated about PT diagnosis, prognosis, and plan of care; educated on role of physical therapy; educated on HEP; educated on anatomy of knee joint; discussed role of PT for ACL tear rehab; discussed rest/ice/elevation for swelling and symptom management; discussed using crutch(es) for ambulation until quad strength improves and she can walk without a limp. HEP:  Patient instructed on HEP on this date with handout provided and all questions answered. Discussions about how to progress sets/reps/resistance as necessary for fatigue and challenge. Patient was instructed to contact PT with any questions or concerns about HEP moving forward. Patient verbally stated she/he understood. Access Code: [email or web address]  URL: [email or web address]  Date: 09/02/2021  Prepared by:  Abram Foss    Exercises  Sitting Heel Slide with Towel - 3 x daily - 7 x weekly - 10 reps - 10 hold  Standing Gastroc Stretch on Step - 2 x daily - 7 x weekly - 3 reps - 30\" hold  Seated Table Hamstring Stretch - 2 x daily - 7 x weekly - 5 reps - 30\" hold  Long Sitting Quad Set - 10 x daily - 7 x weekly - 10 sets - 10\" hold  Active Straight Leg Raise with Quad Set - 1 x daily - 7 x weekly - 3 sets - 10 reps  Standing Heel Raise - 1 x daily - 7 x weekly - 3 sets - 10 reps  Standing Terminal Knee Extension with Resistance - 1 x daily - 7 x weekly - 3 sets - 10 reps - 5 hold  Wall Quarter Squat - 1 x daily - 7 x weekly - 15\" hold  Single Leg Stance - 1 x daily - 7 x weekly - 5 sets - 10 hold  Sidelying Hip Abduction - 1 x daily - 7 x weekly - 3 sets - 10 reps  Clamshell - 1 x daily - 7 x weekly - 3 sets - 10 reps      Therapeutic Exercise and NMR EXR  [x] (45025) Provided verbal/tactile cueing for activities related to strengthening, flexibility, endurance, ROM for improvements in LE, proximal hip, and core control with self care, mobility, lifting, ambulation. [x] (54779) Provided verbal/tactile cueing for activities related to improving balance, coordination, kinesthetic sense, posture, motor skill, proprioception  to assist with LE, proximal hip, and core control in self care, mobility, lifting, ambulation and eccentric single leg control.      NMR and Therapeutic Activities:    [x] (39205 or 27459) Provided verbal/tactile cueing for activities related to improving balance, coordination, kinesthetic sense, posture, motor skill, proprioception and motor activation to allow for proper function of core, proximal hip and LE with self care and ADLs  [] (31311) Gait Re-education- Provided training and instruction to the patient for proper LE, core and proximal hip recruitment and positioning and eccentric body weight control with ambulation re-education including up and down stairs     Home Exercise Program:    [x] (66852) Reviewed/Progressed HEP activities related to strengthening, flexibility, endurance, ROM of core, proximal hip and LE for functional self-care, mobility, lifting and ambulation/stair navigation   [] (71721)Reviewed/Progressed HEP activities related to improving balance, coordination, kinesthetic sense, posture, motor skill, proprioception of core, proximal hip and LE for self care, mobility, lifting, and ambulation/stair navigation      Manual Treatments:  PROM / STM / Oscillations-Mobs:  G-I, II, III, IV (PA's, Inf., Post.)  [x] (89978) Provided manual therapy to mobilize LE, proximal hip and/or LS spine soft tissue/joints for the purpose of modulating pain, promoting relaxation,  increasing ROM, reducing/eliminating soft tissue swelling/inflammation/restriction, improving soft tissue extensibility and allowing for proper ROM for normal function with self care, mobility, lifting and ambulation. Modalities: Declined - discussed ice for home    Charges:  Timed Code Treatment Minutes: 51   Total Treatment Minutes: 23   800-976    [] EVAL (LOW) 99846 (typically 20 minutes face-to-face)  [] EVAL (MOD) 96808 (typically 30 minutes face-to-face)  [] EVAL (HIGH) 50670 (typically 45 minutes face-to-face)  [] RE-EVAL   [x] RV(18577) x  1  [] IONTO  [x] NMR (27986) x   1  [] VASO  [] Manual (09177) x      [] Other:  [x] TA x   1   [] Mech Traction (48990)  [] ES(attended) (57562)      [] ES (un) (73007):     GOALS:   Patient stated goal: Return to full work duties    [] Progressing: [] Met: [] Not Met: [] Adjusted    Therapist goals for Patient:   Short Term Goals: To be achieved in: 2 weeks  1. Independent in HEP and progression per patient tolerance, in order to prevent re-injury. [] Progressing: [] Met: [] Not Met: [] Adjusted   2. Patient will have a decrease in pain to facilitate improvement in movement, function, and ADLs as indicated by Functional Deficits. [] Progressing: [] Met: [] Not Met: [] Adjusted    Long Term Goals: To be achieved in: 12 weeks  1. Disability index score of 39.5% or less for the [facility] to assist with reaching prior level of function. [] Progressing: [] Met: [] Not Met: [] Adjusted  2.  Patient will demonstrate increased AROM to 0-120 to allow for proper joint LE strength, flexibility, endurance and neuromuscular control In order to eliminate pain and safely return her to her PLOF. PLAN: See eval  [] Continue per plan of care [] Alter current plan (see comments above)  [x] Plan of care initiated [] Hold pending MD visit [] Discharge      Electronically signed by:  Mildred Larkin, PT, DPT, OCS    Note: If patient does not return for scheduled/ recommended follow up visits, this note will serve as a discharge from care along with most recent update on progress.

## 2021-09-07 ENCOUNTER — OFFICE VISIT (OUTPATIENT)
Dept: ORTHOPEDIC SURGERY | Age: 41
End: 2021-09-07
Payer: COMMERCIAL

## 2021-09-07 VITALS
HEIGHT: 65 IN | DIASTOLIC BLOOD PRESSURE: 87 MMHG | HEART RATE: 81 BPM | SYSTOLIC BLOOD PRESSURE: 124 MMHG | RESPIRATION RATE: 12 BRPM | BODY MASS INDEX: 34.49 KG/M2 | WEIGHT: 207 LBS

## 2021-09-07 DIAGNOSIS — S83.511A CHRONIC RUPTURE OF ACL OF RIGHT KNEE: ICD-10-CM

## 2021-09-07 DIAGNOSIS — M25.461 KNEE EFFUSION, RIGHT: ICD-10-CM

## 2021-09-07 DIAGNOSIS — M25.561 ACUTE PAIN OF RIGHT KNEE: Primary | ICD-10-CM

## 2021-09-07 PROCEDURE — 99214 OFFICE O/P EST MOD 30 MIN: CPT | Performed by: ORTHOPAEDIC SURGERY

## 2021-09-07 PROCEDURE — G8427 DOCREV CUR MEDS BY ELIG CLIN: HCPCS | Performed by: ORTHOPAEDIC SURGERY

## 2021-09-07 PROCEDURE — 20610 DRAIN/INJ JOINT/BURSA W/O US: CPT | Performed by: ORTHOPAEDIC SURGERY

## 2021-09-07 PROCEDURE — 4004F PT TOBACCO SCREEN RCVD TLK: CPT | Performed by: ORTHOPAEDIC SURGERY

## 2021-09-07 PROCEDURE — G8417 CALC BMI ABV UP PARAM F/U: HCPCS | Performed by: ORTHOPAEDIC SURGERY

## 2021-09-07 RX ORDER — LIDOCAINE HYDROCHLORIDE 10 MG/ML
3 INJECTION, SOLUTION INFILTRATION; PERINEURAL ONCE
Status: COMPLETED | OUTPATIENT
Start: 2021-09-07 | End: 2021-09-07

## 2021-09-07 RX ORDER — TOPIRAMATE 25 MG/1
25 TABLET ORAL 2 TIMES DAILY
COMMUNITY
Start: 2021-08-19 | End: 2022-06-16

## 2021-09-07 RX ORDER — SUMATRIPTAN 100 MG/1
50 TABLET, FILM COATED ORAL
COMMUNITY
Start: 2021-07-27

## 2021-09-07 RX ADMIN — LIDOCAINE HYDROCHLORIDE 3 ML: 10 INJECTION, SOLUTION INFILTRATION; PERINEURAL at 10:32

## 2021-09-07 NOTE — PROGRESS NOTES
Herlinda Bailey returns today follow-up for her right knee. She said recurrent swelling after an episode last week where she felt like her knee shifted. Until that time she was making good progress. She is frustrated. Patient's medications, allergies, past medical, surgical, social and family histories were reviewed and updated as appropriate. Relevant review of systems reviewed. General Exam:    Vitals: Blood pressure 124/87, pulse 81, resp. rate 12, height 5' 5\" (1.651 m), weight 207 lb (93.9 kg), not currently breastfeeding. Constitutional: Patient is adequately groomed with no evidence of malnutrition  Mental Status: The patient is oriented to time, place and person. The patient's mood and affect are appropriate. She is walking with one crutch today. Right knee shows increased anterior translation but a large knee effusion. She has no tenderness. She has no warmth erythema. Range of motion 0 to about 95 degrees. Assessment: Right knee effusion after shifting episode with chronic right ACL tear. Plan: I will aspirate the knee again today. She is now off her blood thinners because of some bleeding complications when she was being treated for DVT. I remain very concerned about proceeding with surgery given her recent DVT. We discussed this at length. Medical decision making today was at least moderate. Procedure: Under sterile technique, the right knee was anesthetized in the suprapatellar pouch. I Then aspirated the right knee of about 55 cc of serosanguineous fluid. It decompressed nicely. She will be fit with a new brace because her old one is worn out. She will continue with rehab and follow-up with me in a month. This note was created using voice recognition software. It has been proofread, but occasionally errors remain. Please disregard these errors. They will be corrected as they are noted.

## 2021-09-09 ENCOUNTER — HOSPITAL ENCOUNTER (OUTPATIENT)
Dept: PHYSICAL THERAPY | Age: 41
Setting detail: THERAPIES SERIES
Discharge: HOME | End: 2021-09-09
Payer: COMMERCIAL

## 2021-09-09 PROCEDURE — 97530 THERAPEUTIC ACTIVITIES: CPT

## 2021-09-09 PROCEDURE — 97112 NEUROMUSCULAR REEDUCATION: CPT

## 2021-09-09 PROCEDURE — 97110 THERAPEUTIC EXERCISES: CPT

## 2021-09-09 NOTE — FLOWSHEET NOTE
[address] [facility], [place]        Physical Therapy Daily Treatment Note  Date:  2021    Patient Name:  Shahid Pérez"  :  1980  MRN: [id number]  Restrictions/Precautions:    Medical/Treatment Diagnosis Information:  · Diagnosis: M25.561 (ICD-10-CM) - Acute pain of right knee  · Treatment Diagnosis: M25.561 R Knee Pain; M25.361 R Knee Instability; M62.559 Atrophy of unspecified thigh; Insurance/Certification information:  PT Insurance Information: [facility]; 30 PT visits; auth required  Physician Information:  Referring Practitioner: Dr. Virgie Rubi  Has the plan of care been signed (Y/N):        [x]  Yes  []  No     Date of Patient follow up with Physician: 21      Is this a Progress Report:     []  Yes  [x]  No        If Yes:  Date Range for reporting period:  Beginnin/15/21  Ending:     Progress report will be due (10 Rx or 30 days whichever is less):       Recertification will be due (POC Duration  / 90 days whichever is less): 10/15/21         Visit # Insurance Allowable Auth Required   4 30 [facility]    Approved thru 10/15/21 [x]  Yes []  No      OUTCOME MEASURE DATE DEFICIT   [facility] 7/15/21 79% Deficit   [facility] 21 58% Deficit            Latex Allergy:  [x]NO      []YES  Preferred Language for Healthcare:   [x]English       []other:    Pain level:  0/10 at rest, 5/10 at worst (Prolonged walking);     SUBJECTIVE:  Doing well today. Fatigue immediately following PT but recovers quickly without pain or soreness. Got fluid drained off knee on Tuesday so knee has felt \"looser\" this week and unsteady. No pain at rest today.         OBJECTIVE:        7/15/21 deferred  Flexibility L R Comment   Hamstring         Gastroc         ITB         Quad                      21  ROM PROM AROM Overpressure Comment     L R L R L R     Flexion     140 ERMI    105 \"Tight\"  140       Extension     +3 +3                                              8/31/21  Strength L R Comment   Quad 4 3  Poor+ VMO QS   Hamstring         Gastroc         Hip  flexion         Hip abd         Hip Ext         Hip IR         Hip ER            7/15/21  Special Test Results/Comment   Patellar Apprehension NT   Cavazos's Grind Test NT   Tejas's Unable to test due to lack of ROM   Thessaly's NT   Joint Line Tenderness (+) MJL   Valgus Laxity (-)   Varus Laxity (-)   Lachmans (+) on R   Drop Back NT   BELA NT   FADDIR NT   Scour NT      7/15/21  Girth L R   Mid Patella 46 46   Suprapatellar 50 48.5   5cm above 58 52.0   15cm above 68 65.5      Reflexes/Sensation: NT              []?Dermatomes/Myotomes intact               []?Reflexes equal and normal bilaterally              []?Other:     Joint mobility:                [x]? Normal: good patellar mobility                      []?Hypo              []?Hyper     Palpation: Moderate TTP at MJL; no TTP at pes anserine infrapatellar fat pads,  LJL tenderness, IT Band and gerdy's tubercle tenderness 8/31/21     Functional Mobility/Transfers: ind but has difficulty getting on/off table     Posture: mild varus     Bandages/Dressings/Incisions: n/a     Gait: (include devices/WB status) Antalgic; no AD used today --> discussed with patient using cruch(es) until able to walk without a limp     Orthopedic Special Tests: see above. Functional testing deferred.        RESTRICTIONS/PRECAUTIONS: ACL Tear / Medial meniscus tear / tibial plateau fracture Right knee; also on eliquis for blood clot  Exercises/Interventions:  Exercise/Equipment Resistance/Repetitions Other comments   Stretching       Hamstring 3x30\"    Hip Flexor     ITB     Figure 4     Quad     Inclined Calf 3x30\"               ROM       EOB Self-Assist        Wall Slide     Manual     Biodex Passive     Recumbent Bike 5' Level 3 Seat 10   ERMI 10x10\"    Hang Weights                Patellar Glides       Medial       Superior       Inferior               Isometrics       Quad sets hold  Long Sitting Quad Set - 10 x daily - 7 x weekly - 10 sets - 10\" hold  Active Straight Leg Raise with Quad Set - 1 x daily - 7 x weekly - 3 sets - 10 reps  Standing Heel Raise - 1 x daily - 7 x weekly - 3 sets - 10 reps  Standing Terminal Knee Extension with Resistance - 1 x daily - 7 x weekly - 3 sets - 10 reps - 5 hold  Wall Quarter Squat - 1 x daily - 7 x weekly - 15\" hold  Single Leg Stance - 1 x daily - 7 x weekly - 5 sets - 10 hold  Sidelying Hip Abduction - 1 x daily - 7 x weekly - 3 sets - 10 reps  Clamshell - 1 x daily - 7 x weekly - 3 sets - 10 reps      Therapeutic Exercise and NMR EXR  [x] (70294) Provided verbal/tactile cueing for activities related to strengthening, flexibility, endurance, ROM for improvements in LE, proximal hip, and core control with self care, mobility, lifting, ambulation. [x] (62477) Provided verbal/tactile cueing for activities related to improving balance, coordination, kinesthetic sense, posture, motor skill, proprioception  to assist with LE, proximal hip, and core control in self care, mobility, lifting, ambulation and eccentric single leg control.      NMR and Therapeutic Activities:    [x] (95918 or 65167) Provided verbal/tactile cueing for activities related to improving balance, coordination, kinesthetic sense, posture, motor skill, proprioception and motor activation to allow for proper function of core, proximal hip and LE with self care and ADLs  [] (24559) Gait Re-education- Provided training and instruction to the patient for proper LE, core and proximal hip recruitment and positioning and eccentric body weight control with ambulation re-education including up and down stairs     Home Exercise Program:    [x] (03605) Reviewed/Progressed HEP activities related to strengthening, flexibility, endurance, ROM of core, proximal hip and LE for functional self-care, mobility, lifting and ambulation/stair navigation   [] (16352)Reviewed/Progressed HEP activities related to improving balance, coordination, kinesthetic sense, posture, motor skill, proprioception of core, proximal hip and LE for self care, mobility, lifting, and ambulation/stair navigation      Manual Treatments:  PROM / STM / Oscillations-Mobs:  G-I, II, III, IV (PA's, Inf., Post.)  [x] (61753) Provided manual therapy to mobilize LE, proximal hip and/or LS spine soft tissue/joints for the purpose of modulating pain, promoting relaxation,  increasing ROM, reducing/eliminating soft tissue swelling/inflammation/restriction, improving soft tissue extensibility and allowing for proper ROM for normal function with self care, mobility, lifting and ambulation. Modalities: Declined - discussed ice for home    Charges:  Timed Code Treatment Minutes: 60   Total Treatment Minutes: 60   200-307    [] EVAL (LOW) 12409 (typically 20 minutes face-to-face)  [] EVAL (MOD) 93387 (typically 30 minutes face-to-face)  [] EVAL (HIGH) 23579 (typically 45 minutes face-to-face)  [] RE-EVAL   [x] RG(45390) x  2  [] IONTO  [x] NMR (01300) x   1  [] VASO  [] Manual (05700) x      [] Other:  [x] TA x   1   [] Mech Traction (70136)  [] ES(attended) (98131)      [] ES (un) (68581):     GOALS:   Patient stated goal: Return to full work duties    [] Progressing: [] Met: [] Not Met: [] Adjusted    Therapist goals for Patient:   Short Term Goals: To be achieved in: 2 weeks  1. Independent in HEP and progression per patient tolerance, in order to prevent re-injury. [] Progressing: [] Met: [] Not Met: [] Adjusted   2. Patient will have a decrease in pain to facilitate improvement in movement, function, and ADLs as indicated by Functional Deficits. [] Progressing: [] Met: [] Not Met: [] Adjusted    Long Term Goals: To be achieved in: 12 weeks  1. Disability index score of 39.5% or less for the [facility] to assist with reaching prior level of function. [] Progressing: [] Met: [] Not Met: [] Adjusted  2.  Patient will demonstrate increased AROM to 0-120 to allow for proper joint functioning as indicated by patients Functional Deficits. [] Progressing: [] Met: [] Not Met: [] Adjusted  3. Patient will demonstrate an increase in Strength to 4+/5 in BLE to allow for proper functional mobility as indicated by patients Functional Deficits. [] Progressing: [] Met: [] Not Met: [] Adjusted  4. Patient will return to ADL and other functional activities without increased symptoms or restriction. [] Progressing: [] Met: [] Not Met: [] Adjusted  5. Patient will ascend/descend a flight of stairs independently without pain or giving away in knee. (patient specific functional goal)    [] Progressing: [] Met: [] Not Met: [] Adjusted     Overall Progression Towards Functional goals/ Treatment Progress Update:  [] Patient is progressing as expected towards functional goals listed. [] Progression is slowed due to complexities/Impairments listed. [] Progression has been slowed due to co-morbidities. [x] Plan just implemented, too soon to assess goals progression <30days   [] Goals require adjustment due to lack of progress  [] Patient is not progressing as expected and requires additional follow up with physician  [] Other    Prognosis for POC: [x] Good [] Fair  [] Poor      Patient requires continued skilled intervention: [x] Yes  [] No    Treatment/Activity Tolerance:  [x] Patient able to complete treatment  [] Patient limited by fatigue  [] Patient limited by pain     [] Patient limited by other medical complications  [] Other: Had full ROM on ERMI today without pain or significant tightness at end range after fluid drained from knee. Having a bit more instability with gait now that fluid is out so focused a bit on gait training today. Cues needed to maintain knee in extension but not to hyperextend in stance phase with noticeable shakiness in quad noted.  Patient educated about quad inhibition and role of quad strength / control to help maintain knee control in

## 2021-09-13 ENCOUNTER — HOSPITAL ENCOUNTER (OUTPATIENT)
Dept: PHYSICAL THERAPY | Age: 41
Setting detail: THERAPIES SERIES
Discharge: HOME | End: 2021-09-13
Payer: COMMERCIAL

## 2021-09-13 PROCEDURE — 97112 NEUROMUSCULAR REEDUCATION: CPT

## 2021-09-13 PROCEDURE — 97110 THERAPEUTIC EXERCISES: CPT

## 2021-09-13 PROCEDURE — 97530 THERAPEUTIC ACTIVITIES: CPT

## 2021-09-13 NOTE — FLOWSHEET NOTE
[address] [facility], [place]        Physical Therapy Daily Treatment Note  Date:  2021    Patient Name:  Alyse West"  :  1980  MRN: [id number]  Restrictions/Precautions:    Medical/Treatment Diagnosis Information:  · Diagnosis: M25.561 (ICD-10-CM) - Acute pain of right knee  · Treatment Diagnosis: M25.561 R Knee Pain; M25.361 R Knee Instability; M62.559 Atrophy of unspecified thigh; Insurance/Certification information:  [place] [facility]; auth required  Physician Information:  Referring Practitioner: Dr. Landry Burgess  Has the plan of care been signed (Y/N):        [x]  Yes  []  No     Date of Patient follow up with Physician: 21      Is this a Progress Report:     []  Yes  [x]  No        If Yes:  Date Range for reporting period:  Beginnin/15/21  Ending:     Progress report will be due (10 Rx or 30 days whichever is less):       Recertification will be due (POC Duration  / 90 days whichever is less): 10/15/21         Visit # Insurance Allowable Auth Required   5 30 [facility]    Approved thru 10/15/21 [x]  Yes []  No      OUTCOME MEASURE DATE DEFICIT   [facility] 7/15/21 79% Deficit   [facility] 21 58% Deficit            Latex Allergy:  [x]NO      []YES  Preferred Language for Healthcare:   [x]English       []other:    Pain level:  0/10 at rest, 5/10 at worst (Prolonged walking);     SUBJECTIVE:  Feels a little tight today but no pain. Small amount of soreness after last visit but states it went away quickly \"I didn't even need to put ice on it\". Walking improving with less feeling of shifting/instability than last week.          OBJECTIVE:        7/15/21 deferred  Flexibility L R Comment   Hamstring         Gastroc         ITB         Quad                      21  ROM PROM AROM Overpressure Comment     L R L R L R     Flexion     140 ERMI    105 \"Tight\"  140       Extension     +3 +3                                   SLR       Supine 3x10 2#   Prone     Abduction 3x10 2#   Adducton     SLR+               Glutes       Bridges     Supine Clams     S/L Clams 2x10 each    Side Stepping       Monster walks               CKC       Weight Shift     Calf raises 3x10; 5# Arminda Pritchard sits 5x15\"    Step ups 2x10; 4\" green step     Squatting     CC TKE 2x10x5\" holds 4pl   SL DL               PRE       Extension 3x10; 5# RANGE: 90-40   Flexion 3x10; 40# DL RANGE: Avail   Leg Press 3x10 DL; 100# (inc NPV) RANGE: 80-10   Cable Column               Balance       Tandem Stance     Tilt board       SLS 3x20\"     Biodex balance PS L10 3'             Other       Treadmill            Manual Interventions          Patient have access to gym? [] Yes  [] No  Patient have equipment at home? [] Yes  [] No       Patient Education:   7/15/21: Patient educated about PT diagnosis, prognosis, and plan of care; educated on role of physical therapy; educated on HEP; educated on anatomy of knee joint; discussed role of PT for ACL tear rehab; discussed rest/ice/elevation for swelling and symptom management; discussed using crutch(es) for ambulation until quad strength improves and she can walk without a limp. HEP:  Patient instructed on HEP on this date with handout provided and all questions answered. Discussions about how to progress sets/reps/resistance as necessary for fatigue and challenge. Patient was instructed to contact PT with any questions or concerns about HEP moving forward. Patient verbally stated she/he understood. Access Code: [email or web address]  URL: [email or web address]  Date: 09/13/2021  Prepared by:  Abram Foss    Exercises  Sitting Heel Slide with Towel - 3 x daily - 7 x weekly - 10 reps - 10 hold  Standing Gastroc Stretch on Step - 2 x daily - 7 x weekly - 3 reps - 30\" hold  Seated Table Hamstring Stretch - 2 x daily - 7 x weekly - 5 reps - 30\" hold  Long Sitting Quad Set - 10 x daily - 7 x weekly - 10 sets - 10\" hold  Active Straight Leg Raise with Quad Set - 1 x daily - 7 x weekly - 3 sets - 10 reps  Sidelying Hip Abduction - 1 x daily - 7 x weekly - 3 sets - 10 reps  Clamshell - 1 x daily - 7 x weekly - 3 sets - 10 reps  Standing Heel Raise - 1 x daily - 7 x weekly - 3 sets - 10 reps  Standing Terminal Knee Extension with Resistance - 1 x daily - 7 x weekly - 3 sets - 10 reps - 5 hold  Wall Quarter Squat - 1 x daily - 7 x weekly - 15\" hold  Single Leg Stance - 1 x daily - 7 x weekly - 3 sets - 20-30 hold  Step Up - 1 x daily - 7 x weekly - 3 sets - 10 reps  Seated Long Arc Quad (90-45 Degree Range) - 1 x daily - 7 x weekly - 3 sets - 10 reps      Therapeutic Exercise and NMR EXR  [x] (47556) Provided verbal/tactile cueing for activities related to strengthening, flexibility, endurance, ROM for improvements in LE, proximal hip, and core control with self care, mobility, lifting, ambulation. [x] (33612) Provided verbal/tactile cueing for activities related to improving balance, coordination, kinesthetic sense, posture, motor skill, proprioception  to assist with LE, proximal hip, and core control in self care, mobility, lifting, ambulation and eccentric single leg control.      NMR and Therapeutic Activities:    [x] (63825 or 81676) Provided verbal/tactile cueing for activities related to improving balance, coordination, kinesthetic sense, posture, motor skill, proprioception and motor activation to allow for proper function of core, proximal hip and LE with self care and ADLs  [] (37826) Gait Re-education- Provided training and instruction to the patient for proper LE, core and proximal hip recruitment and positioning and eccentric body weight control with ambulation re-education including up and down stairs     Home Exercise Program:    [x] (33922) Reviewed/Progressed HEP activities related to strengthening, flexibility, endurance, ROM of core, proximal hip and LE for functional self-care, mobility, lifting and ambulation/stair navigation   [] (44718)Reviewed/Progressed HEP activities related to improving balance, coordination, kinesthetic sense, posture, motor skill, proprioception of core, proximal hip and LE for self care, mobility, lifting, and ambulation/stair navigation      Manual Treatments:  PROM / STM / Oscillations-Mobs:  G-I, II, III, IV (PA's, Inf., Post.)  [x] (70175) Provided manual therapy to mobilize LE, proximal hip and/or LS spine soft tissue/joints for the purpose of modulating pain, promoting relaxation,  increasing ROM, reducing/eliminating soft tissue swelling/inflammation/restriction, improving soft tissue extensibility and allowing for proper ROM for normal function with self care, mobility, lifting and ambulation. Modalities: Declined - discussed ice for home    Charges:  Timed Code Treatment Minutes: 60   Total Treatment Minutes: 60   447-600    [] EVAL (LOW) 01285 (typically 20 minutes face-to-face)  [] EVAL (MOD) 11424 (typically 30 minutes face-to-face)  [] EVAL (HIGH) 23248 (typically 45 minutes face-to-face)  [] RE-EVAL   [x] OI(14538) x  2  [] IONTO  [x] NMR (01450) x   1  [] VASO  [] Manual (29126) x      [] Other:  [x] TA x   1   [] Mech Traction (91983)  [] ES(attended) (58571)      [] ES (un) (66283):     GOALS:   Patient stated goal: Return to full work duties    [] Progressing: [] Met: [] Not Met: [] Adjusted    Therapist goals for Patient:   Short Term Goals: To be achieved in: 2 weeks  1. Independent in HEP and progression per patient tolerance, in order to prevent re-injury. [] Progressing: [] Met: [] Not Met: [] Adjusted   2. Patient will have a decrease in pain to facilitate improvement in movement, function, and ADLs as indicated by Functional Deficits. [] Progressing: [] Met: [] Not Met: [] Adjusted    Long Term Goals: To be achieved in: 12 weeks  1. Disability index score of 39.5% or less for the [facility] to assist with reaching prior level of function.    [] Progressing: [] Met: [] Not Met: [] Adjusted  2. Patient will demonstrate increased AROM to 0-120 to allow for proper joint functioning as indicated by patients Functional Deficits. [] Progressing: [] Met: [] Not Met: [] Adjusted  3. Patient will demonstrate an increase in Strength to 4+/5 in BLE to allow for proper functional mobility as indicated by patients Functional Deficits. [] Progressing: [] Met: [] Not Met: [] Adjusted  4. Patient will return to ADL and other functional activities without increased symptoms or restriction. [] Progressing: [] Met: [] Not Met: [] Adjusted  5. Patient will ascend/descend a flight of stairs independently without pain or giving away in knee. (patient specific functional goal)    [] Progressing: [] Met: [] Not Met: [] Adjusted     Overall Progression Towards Functional goals/ Treatment Progress Update:  [] Patient is progressing as expected towards functional goals listed. [] Progression is slowed due to complexities/Impairments listed. [] Progression has been slowed due to co-morbidities. [x] Plan just implemented, too soon to assess goals progression <30days   [] Goals require adjustment due to lack of progress  [] Patient is not progressing as expected and requires additional follow up with physician  [] Other    Prognosis for POC: [x] Good [] Fair  [] Poor      Patient requires continued skilled intervention: [x] Yes  [] No    Treatment/Activity Tolerance:  [x] Patient able to complete treatment  [] Patient limited by fatigue  [] Patient limited by pain     [] Patient limited by other medical complications  [] Other: Continues to be very challenged and fatigued with current exercise program. Had difficulty extending knee fully on 4\" step up with shaking noted in her quad demonstrating her lack of functional quad strength. She did show much improvement today with SLS control with minimal shaking and no hand taps to bars for support.  Added leg press and HS curl machines with no issues noted in

## 2021-09-16 ENCOUNTER — HOSPITAL ENCOUNTER (OUTPATIENT)
Dept: PHYSICAL THERAPY | Age: 41
Setting detail: THERAPIES SERIES
Discharge: HOME | End: 2021-09-16
Payer: COMMERCIAL

## 2021-09-16 PROCEDURE — 97530 THERAPEUTIC ACTIVITIES: CPT

## 2021-09-16 PROCEDURE — 97016 VASOPNEUMATIC DEVICE THERAPY: CPT

## 2021-09-16 PROCEDURE — 97110 THERAPEUTIC EXERCISES: CPT

## 2021-09-16 PROCEDURE — 97112 NEUROMUSCULAR REEDUCATION: CPT

## 2021-09-16 NOTE — FLOWSHEET NOTE
[address] [facility], [place]        Physical Therapy Daily Treatment Note  Date:  2021    Patient Name:  Jacqueline Pineda"  :  1980  MRN: [id number]  Restrictions/Precautions:    Medical/Treatment Diagnosis Information:  · Diagnosis: M25.561 (ICD-10-CM) - Acute pain of right knee  · Treatment Diagnosis: M25.561 R Knee Pain; M25.361 R Knee Instability; M62.559 Atrophy of unspecified thigh; Insurance/Certification information:  PT Insurance Information: [facility]; 30 PT visits; auth required  Physician Information:  Referring Practitioner: Dr. Latrell Overton  Has the plan of care been signed (Y/N):        [x]  Yes  []  No     Date of Patient follow up with Physician: 21      Is this a Progress Report:     []  Yes  [x]  No        If Yes:  Date Range for reporting period:  Beginnin/15/21  Ending:     Progress report will be due (10 Rx or 30 days whichever is less):       Recertification will be due (POC Duration  / 90 days whichever is less): 10/15/21         Visit # Insurance Allowable Auth Required   6 30 V[facility]Y    Approved thru 10/15/21 [x]  Yes []  No      OUTCOME MEASURE DATE DEFICIT   WOMAC 7/15/21 79% Deficit   WOMAC 21 58% Deficit            Latex Allergy:  [x]NO      []YES  Preferred Language for Healthcare:   [x]English       []other:    Pain level: 1-2/10 at rest, 5/10 at worst (Prolonged walking);     SUBJECTIVE:  Reports onset of bilateral knee pain and swelling yesterday morning following her PT Tuesday afternoon. Had stiffness and difficulty moving her knee and walking. Pain and swelling improved today but still present. Thinks maybe from leg press exercise on Tuesday.          OBJECTIVE:        7/15/21 deferred  Flexibility L R Comment   Hamstring         Gastroc         ITB         Quad                      21  ROM PROM AROM Overpressure Comment     L R L R L R     Flexion     140 ERMI    105 \"Tight\"  140       Extension     +3 +3                                                8/31/21  Strength L R Comment   Quad 4 3  Poor+ VMO QS   Hamstring         Gastroc         Hip  flexion         Hip abd         Hip Ext         Hip IR         Hip ER            7/15/21  Special Test Results/Comment   Patellar Apprehension NT   Cavazos's Grind Test NT   Tejas's Unable to test due to lack of ROM   Thessaly's NT   Joint Line Tenderness (+) MJL   Valgus Laxity (-)   Varus Laxity (-)   Lachmans (+) on R   Drop Back NT   BELA NT   FADDIR NT   Scour NT      7/15/21  Girth L R   Mid Patella 46 46   Suprapatellar 50 48.5   5cm above 58 52.0   15cm above 68 65.5      Reflexes/Sensation: NT              []?Dermatomes/Myotomes intact               []?Reflexes equal and normal bilaterally              []?Other:     Joint mobility:                [x]? Normal: good patellar mobility                      []?Hypo              []?Hyper     Palpation: Moderate TTP at MJL; no TTP at pes anserine infrapatellar fat pads,  LJL tenderness, IT Band and gerdy's tubercle tenderness 8/31/21     Functional Mobility/Transfers: ind but has difficulty getting on/off table     Posture: mild varus     Bandages/Dressings/Incisions: n/a     Gait: (include devices/WB status) Antalgic; no AD used today --> discussed with patient using cruch(es) until able to walk without a limp     Orthopedic Special Tests: see above. Functional testing deferred.        RESTRICTIONS/PRECAUTIONS: ACL Tear / Medial meniscus tear / tibial plateau fracture Right knee; also on eliquis for blood clot  Exercises/Interventions:  Exercise/Equipment Resistance/Repetitions Other comments   Stretching       Hamstring 3x30\"    Hip Flexor     ITB     Figure 4     Quad     Inclined Calf 3x30\"               ROM       EOB Self-Assist     Sheet Pull 10x10\"    Wall Slide     Manual     Biodex Passive     Recumbent Bike 5' Level 3 Seat [address] Weights                Patellar Glides       Medial       Superior Hip Abduction - 1 x daily - 7 x weekly - 3 sets - 10 reps  Clamshell - 1 x daily - 7 x weekly - 3 sets - 10 reps  Standing Heel Raise - 1 x daily - 7 x weekly - 3 sets - 10 reps  Standing Terminal Knee Extension with Resistance - 1 x daily - 7 x weekly - 3 sets - 10 reps - 5 hold  Wall Quarter Squat - 1 x daily - 7 x weekly - 15\" hold  Single Leg Stance - 1 x daily - 7 x weekly - 3 sets - 20-30 hold  Step Up - 1 x daily - 7 x weekly - 3 sets - 10 reps  Seated Long Arc Quad (90-45 Degree Range) - 1 x daily - 7 x weekly - 3 sets - 10 reps      Therapeutic Exercise and NMR EXR  [x] (82238) Provided verbal/tactile cueing for activities related to strengthening, flexibility, endurance, ROM for improvements in LE, proximal hip, and core control with self care, mobility, lifting, ambulation. [x] (23460) Provided verbal/tactile cueing for activities related to improving balance, coordination, kinesthetic sense, posture, motor skill, proprioception  to assist with LE, proximal hip, and core control in self care, mobility, lifting, ambulation and eccentric single leg control.      NMR and Therapeutic Activities:    [x] (14731 or 30817) Provided verbal/tactile cueing for activities related to improving balance, coordination, kinesthetic sense, posture, motor skill, proprioception and motor activation to allow for proper function of core, proximal hip and LE with self care and ADLs  [] (40618) Gait Re-education- Provided training and instruction to the patient for proper LE, core and proximal hip recruitment and positioning and eccentric body weight control with ambulation re-education including up and down stairs     Home Exercise Program:    [x] (64971) Reviewed/Progressed HEP activities related to strengthening, flexibility, endurance, ROM of core, proximal hip and LE for functional self-care, mobility, lifting and ambulation/stair navigation   [] (57748)Reviewed/Progressed HEP activities related to improving balance, coordination, kinesthetic sense, posture, motor skill, proprioception of core, proximal hip and LE for self care, mobility, lifting, and ambulation/stair navigation      Manual Treatments:  PROM / STM / Oscillations-Mobs:  G-I, II, III, IV (PA's, Inf., Post.)  [x] (94227) Provided manual therapy to mobilize LE, proximal hip and/or LS spine soft tissue/joints for the purpose of modulating pain, promoting relaxation,  increasing ROM, reducing/eliminating soft tissue swelling/inflammation/restriction, improving soft tissue extensibility and allowing for proper ROM for normal function with self care, mobility, lifting and ambulation. Modalities: Vaso 15'    Charges:  Timed Code Treatment Minutes: 43   Total Treatment Minutes: 62   250-348    [] EVAL (LOW) 55445 (typically 20 minutes face-to-face)  [] EVAL (MOD) 95592 (typically 30 minutes face-to-face)  [] EVAL (HIGH) 32458 (typically 45 minutes face-to-face)  [] RE-EVAL   [x] EN(18764) x  1  [] IONTO  [x] NMR (27306) x   1  [x] VASO  [] Manual (00296) x      [] Other:  [x] TA x   1   [] Mech Traction (73006)  [] ES(attended) (04529)      [] ES (un) (14362):     GOALS:   Patient stated goal: Return to full work duties    [] Progressing: [] Met: [] Not Met: [] Adjusted    Therapist goals for Patient:   Short Term Goals: To be achieved in: 2 weeks  1. Independent in HEP and progression per patient tolerance, in order to prevent re-injury. [] Progressing: [] Met: [] Not Met: [] Adjusted   2. Patient will have a decrease in pain to facilitate improvement in movement, function, and ADLs as indicated by Functional Deficits. [] Progressing: [] Met: [] Not Met: [] Adjusted    Long Term Goals: To be achieved in: 12 weeks  1. Disability index score of 39.5% or less for the [facility] to assist with reaching prior level of function. [] Progressing: [] Met: [] Not Met: [] Adjusted  2.  Patient will demonstrate increased AROM to 0-120 to allow for proper joint functioning as indicated by patients Functional Deficits. [] Progressing: [] Met: [] Not Met: [] Adjusted  3. Patient will demonstrate an increase in Strength to 4+/5 in BLE to allow for proper functional mobility as indicated by patients Functional Deficits. [] Progressing: [] Met: [] Not Met: [] Adjusted  4. Patient will return to ADL and other functional activities without increased symptoms or restriction. [] Progressing: [] Met: [] Not Met: [] Adjusted  5. Patient will ascend/descend a flight of stairs independently without pain or giving away in knee. (patient specific functional goal)    [] Progressing: [] Met: [] Not Met: [] Adjusted     Overall Progression Towards Functional goals/ Treatment Progress Update:  [] Patient is progressing as expected towards functional goals listed. [] Progression is slowed due to complexities/Impairments listed. [] Progression has been slowed due to co-morbidities. [x] Plan just implemented, too soon to assess goals progression <30days   [] Goals require adjustment due to lack of progress  [] Patient is not progressing as expected and requires additional follow up with physician  [] Other    Prognosis for POC: [x] Good [] Fair  [] Poor      Patient requires continued skilled intervention: [x] Yes  [] No    Treatment/Activity Tolerance:  [x] Patient able to complete treatment  [] Patient limited by fatigue  [] Patient limited by pain     [] Patient limited by other medical complications  [] Other: Modified program today to hold on CKC activities due to increase in joint pain and swelling following last session. Only mild amount of swelling present today but patient was TTP along MJL and LJL. No issues with table exercises today and still is very fatigued and challenged by them. Cues needed during clamshells to avoid posterior trunk rotation compensation.   Continue PT services to return patient's full LE strength, flexibility, endurance and neuromuscular control In order to eliminate pain and safely return her to her PLOF. PLAN: See eval  [] Continue per plan of care [] Alter current plan (see comments above)  [x] Plan of care initiated [] Hold pending MD visit [] Discharge      Electronically signed by:  Selvin Lao, PT, DPT, OCS    Note: If patient does not return for scheduled/ recommended follow up visits, this note will serve as a discharge from care along with most recent update on progress.

## 2021-09-20 ENCOUNTER — HOSPITAL ENCOUNTER (OUTPATIENT)
Dept: PHYSICAL THERAPY | Age: 41
Setting detail: THERAPIES SERIES
Discharge: HOME | End: 2021-09-20
Payer: COMMERCIAL

## 2021-09-20 PROCEDURE — 97530 THERAPEUTIC ACTIVITIES: CPT | Performed by: PHYSICAL THERAPIST

## 2021-09-20 PROCEDURE — 97110 THERAPEUTIC EXERCISES: CPT | Performed by: PHYSICAL THERAPIST

## 2021-09-20 PROCEDURE — 97016 VASOPNEUMATIC DEVICE THERAPY: CPT | Performed by: PHYSICAL THERAPIST

## 2021-09-20 NOTE — FLOWSHEET NOTE
[address] [facility], [place]        Physical Therapy Daily Treatment Note  Date:  2021    Patient Name:  Mendy Gonzalez"  :  1980  MRN: [id number]  Restrictions/Precautions:    Medical/Treatment Diagnosis Information:  · Diagnosis: M25.561 (ICD-10-CM) - Acute pain of right knee  · Treatment Diagnosis: M25.561 R Knee Pain; M25.361 R Knee Instability; M62.559 Atrophy of unspecified thigh; Insurance/Certification information:  PT Insurance Information: [employer]; 30 PT visits; auth required  Physician Information:  Referring Practitioner: Dr. Koko Winston  Has the plan of care been signed (Y/N):        [x]  Yes  []  No     Date of Patient follow up with Physician: 21      Is this a Progress Report:     []  Yes  [x]  No        If Yes:  Date Range for reporting period:  Beginnin/15/21  Ending:     Progress report will be due (10 Rx or 30 days whichever is less):       Recertification will be due (POC Duration  / 90 days whichever is less): 10/15/21         Visit # Insurance Allowable Auth Required    [facility]    Approved thru 10/15/21 [x]  Yes []  No      OUTCOME MEASURE DATE DEFICIT   [facility] 7/15/21 79% Deficit   WOMAC 21 58% Deficit            Latex Allergy:  [x]NO      []YES  Preferred Language for Healthcare:   [x]English       []other:    Pain level:4/10 at rest, 7/10 at worst      SUBJECTIVE:  The patient noted that her knee buckled and gave out yesterday and she went to the floor. Her knee is very soreness and swollen today.           OBJECTIVE:        7/15/21 deferred  Flexibility L R Comment   Hamstring         Gastroc         ITB         Quad                      21  ROM PROM AROM Overpressure Comment     L R L R L R     Flexion     140 ERMI    105 \"Tight\"  140       Extension     +3 +3                                                21  Strength L R Comment   Quad 4 3  Poor+ VMO QS   Hamstring         Gastroc         Hip flexion         Hip abd         Hip Ext         Hip IR         Hip ER            7/15/21  Special Test Results/Comment   Patellar Apprehension NT   Cavazos's Grind Test NT   Tejas's Unable to test due to lack of ROM   Thedunia's NT   Joint Line Tenderness (+) MJL   Valgus Laxity (-)   Varus Laxity (-)   Lachmans (+) on R   Drop Back NT   BELA NT   FADDIR NT   Scour NT      9/20/2021: grossly observed diffuse knee joint swelling     Girth L R   Mid Patella 46 46   Suprapatellar 50 48.5   5cm above 58 52.0   15cm above 68 65.5      Reflexes/Sensation: NT              []?Dermatomes/Myotomes intact               []?Reflexes equal and normal bilaterally              []?Other:     Joint mobility:                [x]? Normal: good patellar mobility                      []?Hypo              []?Hyper     Palpation: Moderate TTP at MJL; no TTP at pes anserine infrapatellar fat pads,  LJL tenderness, IT Band and gerdy's tubercle tenderness 8/31/21     Functional Mobility/Transfers: ind but has difficulty getting on/off table     Posture: mild varus     Bandages/Dressings/Incisions: n/a     Gait: (include devices/WB status) Antalgic- moderate and amb with 1 crutch   Orthopedic Special Tests: see above. Functional testing deferred.        RESTRICTIONS/PRECAUTIONS: ACL Tear / Medial meniscus tear / tibial plateau fracture Right knee; also on eliquis for blood clot  Exercises/Interventions:  Exercise/Equipment Resistance/Repetitions Other comments   Stretching       Hamstring 3x30\"    Hip Flexor     ITB     Figure 4     Quad     Inclined Calf 3x30\"               ROM       EOB Self-Assist     Sheet Pull     Wall Slide     Manual     Biodex Passive     Recumbent Bike 5' Level 3 Seat 10      Hang Weights                Patellar Glides       Medial       Superior       Inferior               Isometrics       Quad sets 10x10\"      Add sets              SLR       Supine 3x10    Prone Abduction 3x10    Adducton     SLR+ Glutes       Bridges     Supine Clams     S/L Clams 3x10 each    Side Stepping       Monster walks               CKC       Weight Shift         Squatting     CC TKE SL DL               PRE       Extension RANGE: Avail   Cable Column               Balance       Tandem Stance     Tilt board                       Other       Treadmill            Manual Interventions          Patient have access to gym? [] Yes  [] No  Patient have equipment at home? [] Yes  [] No       Patient Education:   7/15/21: Patient educated about PT diagnosis, prognosis, and plan of care; educated on role of physical therapy; educated on HEP; educated on anatomy of knee joint; discussed role of PT for ACL tear rehab; discussed rest/ice/elevation for swelling and symptom management; discussed using crutch(es) for ambulation until quad strength improves and she can walk without a limp. HEP:  Patient instructed on HEP on this date with handout provided and all questions answered. Discussions about how to progress sets/reps/resistance as necessary for fatigue and challenge. Patient was instructed to contact PT with any questions or concerns about HEP moving forward. Patient verbally stated she/he understood. Access Code: [email or web address]  URL: [email or web address]  Date: 09/13/2021  Prepared by:  Abram Foss    Exercises  Sitting Heel Slide with Towel - 3 x daily - 7 x weekly - 10 reps - 10 hold  Standing Gastroc Stretch on Step - 2 x daily - 7 x weekly - 3 reps - 30\" hold  Seated Table Hamstring Stretch - 2 x daily - 7 x weekly - 5 reps - 30\" hold  Long Sitting Quad Set - 10 x daily - 7 x weekly - 10 sets - 10\" hold  Active Straight Leg Raise with Quad Set - 1 x daily - 7 x weekly - 3 sets - 10 reps  Sidelying Hip Abduction - 1 x daily - 7 x weekly - 3 sets - 10 reps  Clamshell - 1 x daily - 7 x weekly - 3 sets - 10 reps  Standing Heel Raise - 1 x daily - 7 x weekly - 3 sets - 10 reps  Standing Terminal Knee Extension with Resistance - 1 x daily - 7 x weekly - 3 sets - 10 reps - 5 hold  Wall Quarter Squat - 1 x daily - 7 x weekly - 15\" hold  Single Leg Stance - 1 x daily - 7 x weekly - 3 sets - 20-30 hold  Step Up - 1 x daily - 7 x weekly - 3 sets - 10 reps  Seated Long Arc Quad (90-45 Degree Range) - 1 x daily - 7 x weekly - 3 sets - 10 reps      Therapeutic Exercise and NMR EXR  [x] (49206) Provided verbal/tactile cueing for activities related to strengthening, flexibility, endurance, ROM for improvements in LE, proximal hip, and core control with self care, mobility, lifting, ambulation. [x] (26011) Provided verbal/tactile cueing for activities related to improving balance, coordination, kinesthetic sense, posture, motor skill, proprioception  to assist with LE, proximal hip, and core control in self care, mobility, lifting, ambulation and eccentric single leg control.      NMR and Therapeutic Activities:    [x] (63238 or 47582) Provided verbal/tactile cueing for activities related to improving balance, coordination, kinesthetic sense, posture, motor skill, proprioception and motor activation to allow for proper function of core, proximal hip and LE with self care and ADLs  [] (56682) Gait Re-education- Provided training and instruction to the patient for proper LE, core and proximal hip recruitment and positioning and eccentric body weight control with ambulation re-education including up and down stairs     Home Exercise Program:    [x] (15940) Reviewed/Progressed HEP activities related to strengthening, flexibility, endurance, ROM of core, proximal hip and LE for functional self-care, mobility, lifting and ambulation/stair navigation   [] (36588)Reviewed/Progressed HEP activities related to improving balance, coordination, kinesthetic sense, posture, motor skill, proprioception of core, proximal hip and LE for self care, mobility, lifting, and ambulation/stair navigation      Manual Treatments:  PROM / STM / Oscillations-Mobs:  G-I, II, III, IV (PA's, Inf., Post.)  [x] (82502) Provided manual therapy to mobilize LE, proximal hip and/or LS spine soft tissue/joints for the purpose of modulating pain, promoting relaxation,  increasing ROM, reducing/eliminating soft tissue swelling/inflammation/restriction, improving soft tissue extensibility and allowing for proper ROM for normal function with self care, mobility, lifting and ambulation. Modalities: Vaso 15'    Charges:  Timed Code Treatment Minutes: 45   Total Treatment Minutes: 50       [] EVAL (LOW) 64344 (typically 20 minutes face-to-face)  [] EVAL (MOD) 63521 (typically 30 minutes face-to-face)  [] EVAL (HIGH) 80703 (typically 45 minutes face-to-face)  [] RE-EVAL   [x] SA(63786) x  1  [] IONTO  [] NMR (27585) x     [x] VASO  [] Manual (25272) x      [] Other:  [x] TA x   1   [] Mech Traction (76362)  [] ES(attended) (71622)      [] ES (un) (60117):     GOALS:   Patient stated goal: Return to full work duties    [] Progressing: [] Met: [] Not Met: [] Adjusted    Therapist goals for Patient:   Short Term Goals: To be achieved in: 2 weeks  1. Independent in HEP and progression per patient tolerance, in order to prevent re-injury. [] Progressing: [] Met: [] Not Met: [] Adjusted   2. Patient will have a decrease in pain to facilitate improvement in movement, function, and ADLs as indicated by Functional Deficits. [] Progressing: [] Met: [] Not Met: [] Adjusted    Long Term Goals: To be achieved in: 12 weeks  1. Disability index score of 39.5% or less for the [facility] to assist with reaching prior level of function. [] Progressing: [] Met: [] Not Met: [] Adjusted  2. Patient will demonstrate increased AROM to 0-120 to allow for proper joint functioning as indicated by patients Functional Deficits. [] Progressing: [] Met: [] Not Met: [] Adjusted  3. Patient will demonstrate an increase in Strength to 4+/5 in BLE to allow for proper functional mobility as indicated by patients Functional Deficits. [] Progressing: [] Met: [] Not Met: [] Adjusted  4. Patient will return to ADL and other functional activities without increased symptoms or restriction. [] Progressing: [] Met: [] Not Met: [] Adjusted  5. Patient will ascend/descend a flight of stairs independently without pain or giving away in knee. (patient specific functional goal)    [] Progressing: [] Met: [] Not Met: [] Adjusted     Overall Progression Towards Functional goals/ Treatment Progress Update:  [x] Patient is progressing as expected towards functional goals listed. [] Progression is slowed due to complexities/Impairments listed. [] Progression has been slowed due to co-morbidities. [] Plan just implemented, too soon to assess goals progression <30days   [] Goals require adjustment due to lack of progress  [] Patient is not progressing as expected and requires additional follow up with physician  [] Other    Prognosis for POC: [x] Good [] Fair  [] Poor      Patient requires continued skilled intervention: [x] Yes  [] No    Treatment/Activity Tolerance:  [x] Patient able to complete treatment  [] Patient limited by fatigue  [] Patient limited by pain     [] Patient limited by other medical complications  [] Other: Modified program today due to recent knee giving way episode. Increased swelling, stiffness and pain limited her overall ability to participate in full program. Pt demonstrated in mobility in knee joint at end of session due to decreased stiffness. PLAN: See eval  [] Continue per plan of care [] Alter current plan (see comments above)  [x] Plan of care initiated [] Hold pending MD visit [] Discharge      Electronically signed by:  Yas Talbert PT,     Note: If patient does not return for scheduled/ recommended follow up visits, this note will serve as a discharge from care along with most recent update on progress.

## 2021-09-23 ENCOUNTER — HOSPITAL ENCOUNTER (OUTPATIENT)
Dept: PHYSICAL THERAPY | Age: 41
Setting detail: THERAPIES SERIES
Discharge: HOME | End: 2021-09-23
Payer: COMMERCIAL

## 2021-09-23 NOTE — FLOWSHEET NOTE
[address] [facility], [place]    Physical Therapy  Cancellation/No-show Note  Patient Name:  Geraldine Castaneda  :  1980   Date:  2021  Cancelled visits to date: 2  No-shows to date: 0    For today's appointment patient:  [x]  Cancelled  []  Rescheduled appointment  []  No-show     Reason given by patient:  []  Patient ill  [x]  Conflicting appointment   []  No transportation    []  Conflict with work  []  No reason given  []  Other:     Comments:      Electronically signed by:  Darwin Pillai, PT, DPT, OCS

## 2021-09-27 ENCOUNTER — HOSPITAL ENCOUNTER (OUTPATIENT)
Dept: PHYSICAL THERAPY | Age: 41
Setting detail: THERAPIES SERIES
Discharge: HOME | End: 2021-09-27
Payer: COMMERCIAL

## 2021-09-27 PROCEDURE — 97112 NEUROMUSCULAR REEDUCATION: CPT

## 2021-09-27 PROCEDURE — 97530 THERAPEUTIC ACTIVITIES: CPT

## 2021-09-27 PROCEDURE — 97110 THERAPEUTIC EXERCISES: CPT

## 2021-09-27 NOTE — FLOWSHEET NOTE
[address] [facility], [place]        Physical Therapy Daily Treatment Note  Date:  2021    Patient Name:  Kyle Barriga"  :  1980  MRN: [id number]  Restrictions/Precautions:    Medical/Treatment Diagnosis Information:  · Diagnosis: M25.561 (ICD-10-CM) - Acute pain of right knee  · Treatment Diagnosis: M25.561 R Knee Pain; M25.361 R Knee Instability; M62.559 Atrophy of unspecified thigh; Insurance/Certification information:  PT Insurance Information: [employer]; 30 PT visits; auth required  Physician Information:  Referring Practitioner: Dr. Grace Clemons  Has the plan of care been signed (Y/N):        [x]  Yes  []  No     Date of Patient follow up with Physician: 10/8/21      Is this a Progress Report:     []  Yes  [x]  No        If Yes:  Date Range for reporting period:  Beginnin/15/21  Ending:     Progress report will be due (10 Rx or 30 days whichever is less):       Recertification will be due (POC Duration  / 90 days whichever is less): 10/15/21         Visit # Insurance Allowable Auth Required   8 30 [facility]    Approved thru 10/15/21 [x]  Yes []  No      OUTCOME MEASURE DATE DEFICIT   [facility] 7/15/21 79% Deficit   [facility] 21 58% Deficit            Latex Allergy:  [x]NO      []YES  Preferred Language for Healthcare:   [x]English       []other:    Pain level: 0/10 at rest,     SUBJECTIVE:  Doing better today. Has not had any more instability in her knee in past week. No giving away episodes. Pain/swelling returning to her baseline levels. Reports compliance with HEP. Trying to also ice/elevate 3x/day which seems to help.       OBJECTIVE:        7/15/21 deferred  Flexibility L R Comment   Hamstring         Gastroc         ITB         Quad                      21  ROM PROM AROM Overpressure Comment     L R L R L R     Flexion     140 ERMI    105 \"Tight\"  140       Extension     +3 +3                                                21  Strength L R Comment   Quad 4 3  Poor+ VMO QS   Hamstring         Gastroc         Hip  flexion         Hip abd         Hip Ext         Hip IR         Hip ER            7/15/21  Special Test Results/Comment   Patellar Apprehension NT   Cavazos's Grind Test NT   Tejas's Unable to test due to lack of ROM   Thessaly's NT   Joint Line Tenderness (+) MJL   Valgus Laxity (-)   Varus Laxity (-)   Lachmans (+) on R   Drop Back NT   BELA NT   FADDIR NT   Scour NT          Girth L R   Mid Patella 46 46   Suprapatellar 50 48.5   5cm above 58 52.0   15cm above 68 65.5      Reflexes/Sensation: NT              []?Dermatomes/Myotomes intact               []?Reflexes equal and normal bilaterally              []?Other:     Joint mobility:                [x]? Normal: good patellar mobility                      []?Hypo              []?Hyper     Palpation: Moderate TTP at MJL; no TTP at pes anserine infrapatellar fat pads,  LJL tenderness, IT Band and gerdy's tubercle tenderness 8/31/21     Functional Mobility/Transfers: ind but has difficulty getting on/off table     Posture: mild varus     Bandages/Dressings/Incisions: n/a     Gait: (include devices/WB status) Antalgic- moderate and amb with 1 crutch   Orthopedic Special Tests: see above. Functional testing deferred.        RESTRICTIONS/PRECAUTIONS: ACL Tear / Medial meniscus tear / tibial plateau fracture Right knee; also on eliquis for blood clot  Exercises/Interventions:  Exercise/Equipment Resistance/Repetitions Other comments   Stretching       Hamstring 3x30\"    Hip Flexor     ITB     Figure 4     Quad     Inclined Calf 3x30\"               ROM       EOB Self-Assist        Wall Slide     Manual     Biodex Passive     Recumbent Bike 5' Level 3 Seat 7      Hang Weights                Patellar Glides       Medial       Superior       Inferior               Isometrics       Quad sets 10x10\"      Add sets              SLR       Supine 3x10 1#   Abduction 3x10 1#   Adducton     [employer] 7 x weekly - 3 sets - 10 reps  Standing Terminal Knee Extension with Resistance - 1 x daily - 7 x weekly - 3 sets - 10 reps - 5 hold  Wall Quarter Squat - 1 x daily - 7 x weekly - 15\" hold  Single Leg Stance - 1 x daily - 7 x weekly - 3 sets - 20-30 hold  Step Up - 1 x daily - 7 x weekly - 3 sets - 10 reps  Seated Long Arc Quad (90-45 Degree Range) - 1 x daily - 7 x weekly - 3 sets - 10 reps      Therapeutic Exercise and NMR EXR  [x] (12291) Provided verbal/tactile cueing for activities related to strengthening, flexibility, endurance, ROM for improvements in LE, proximal hip, and core control with self care, mobility, lifting, ambulation. [x] (84266) Provided verbal/tactile cueing for activities related to improving balance, coordination, kinesthetic sense, posture, motor skill, proprioception  to assist with LE, proximal hip, and core control in self care, mobility, lifting, ambulation and eccentric single leg control.      NMR and Therapeutic Activities:    [x] (72576 or 05363) Provided verbal/tactile cueing for activities related to improving balance, coordination, kinesthetic sense, posture, motor skill, proprioception and motor activation to allow for proper function of core, proximal hip and LE with self care and ADLs  [] (64967) Gait Re-education- Provided training and instruction to the patient for proper LE, core and proximal hip recruitment and positioning and eccentric body weight control with ambulation re-education including up and down stairs     Home Exercise Program:    [x] (21247) Reviewed/Progressed HEP activities related to strengthening, flexibility, endurance, ROM of core, proximal hip and LE for functional self-care, mobility, lifting and ambulation/stair navigation   [] (17357)Reviewed/Progressed HEP activities related to improving balance, coordination, kinesthetic sense, posture, motor skill, proprioception of core, proximal hip and LE for self care, mobility, lifting, and ambulation/stair navigation      Manual Treatments:  PROM / STM / Oscillations-Mobs:  G-I, II, III, IV (PA's, Inf., Post.)  [x] (43060) Provided manual therapy to mobilize LE, proximal hip and/or LS spine soft tissue/joints for the purpose of modulating pain, promoting relaxation,  increasing ROM, reducing/eliminating soft tissue swelling/inflammation/restriction, improving soft tissue extensibility and allowing for proper ROM for normal function with self care, mobility, lifting and ambulation. Modalities: None 9/27    Charges:  Timed Code Treatment Minutes: 45   Total Treatment Minutes: 45   245-337     [] EVAL (LOW) 76216 (typically 20 minutes face-to-face)  [] EVAL (MOD) 85580 (typically 30 minutes face-to-face)  [] EVAL (HIGH) 04140 (typically 45 minutes face-to-face)  [] RE-EVAL   [x] ET(00478) x  1  [] IONTO  [x] NMR (79802) x     [] VASO  [] Manual (63635) x      [] Other:  [x] TA x   1   [] Mech Traction (83180)  [] ES(attended) (85168)      [] ES (un) (68334):     GOALS:   Patient stated goal: Return to full work duties    [] Progressing: [] Met: [] Not Met: [] Adjusted    Therapist goals for Patient:   Short Term Goals: To be achieved in: 2 weeks  1. Independent in HEP and progression per patient tolerance, in order to prevent re-injury. [] Progressing: [] Met: [] Not Met: [] Adjusted   2. Patient will have a decrease in pain to facilitate improvement in movement, function, and ADLs as indicated by Functional Deficits. [] Progressing: [] Met: [] Not Met: [] Adjusted    Long Term Goals: To be achieved in: 12 weeks  1. Disability index score of 39.5% or less for the [facility] to assist with reaching prior level of function. [] Progressing: [] Met: [] Not Met: [] Adjusted  2. Patient will demonstrate increased AROM to 0-120 to allow for proper joint functioning as indicated by patients Functional Deficits. [] Progressing: [] Met: [] Not Met: [] Adjusted  3.  Patient will demonstrate an increase in Strength to 4+/5 in BLE to allow for proper functional mobility as indicated by patients Functional Deficits. [] Progressing: [] Met: [] Not Met: [] Adjusted  4. Patient will return to ADL and other functional activities without increased symptoms or restriction. [] Progressing: [] Met: [] Not Met: [] Adjusted  5. Patient will ascend/descend a flight of stairs independently without pain or giving away in knee. (patient specific functional goal)    [] Progressing: [] Met: [] Not Met: [] Adjusted     Overall Progression Towards Functional goals/ Treatment Progress Update:  [x] Patient is progressing as expected towards functional goals listed. [] Progression is slowed due to complexities/Impairments listed. [] Progression has been slowed due to co-morbidities. [] Plan just implemented, too soon to assess goals progression <30days   [] Goals require adjustment due to lack of progress  [] Patient is not progressing as expected and requires additional follow up with physician  [] Other    Prognosis for POC: [x] Good [] Fair  [] Poor      Patient requires continued skilled intervention: [x] Yes  [] No    Treatment/Activity Tolerance:  [x] Patient able to complete treatment  [] Patient limited by fatigue  [] Patient limited by pain     [] Patient limited by other medical complications  [] Other: Much improved tolerance to visit today. No complaint of knee pain throughout session. Swelling back to baseline in her knee. Able to demonstrate good technique with unweighted SLR so progressed to 1# today. Very challenged with clamshell exercise on her side with addition of green theraband this visit. Moderate shakiness during tandem stance on airex pad showing continued lack of dynamic knee control particularly on unstable surfaces. Will continue to progress patient per tolerance to address on-going deficits in ROM, strength, flexibility, endurance and neuromuscular control to allow for safe return to PLOF.         PLAN: See eval  [] Continue per plan of care [] Alter current plan (see comments above)  [x] Plan of care initiated [] Hold pending MD visit [] Discharge      Electronically signed by:  Anabel Sandhu, PT, DPT, OCS    Note: If patient does not return for scheduled/ recommended follow up visits, this note will serve as a discharge from care along with most recent update on progress.

## 2021-09-30 ENCOUNTER — HOSPITAL ENCOUNTER (OUTPATIENT)
Dept: PHYSICAL THERAPY | Age: 41
Setting detail: THERAPIES SERIES
Discharge: HOME | End: 2021-09-30
Payer: COMMERCIAL

## 2021-09-30 PROCEDURE — 97530 THERAPEUTIC ACTIVITIES: CPT

## 2021-09-30 PROCEDURE — 97112 NEUROMUSCULAR REEDUCATION: CPT

## 2021-09-30 PROCEDURE — 97110 THERAPEUTIC EXERCISES: CPT

## 2021-09-30 NOTE — FLOWSHEET NOTE
[address] [facility], [place]        Physical Therapy Daily Treatment Note  Date:  2021    Patient Name:  Richardson Smith"  :  1980  MRN: [id number]  Restrictions/Precautions:    Medical/Treatment Diagnosis Information:  · Diagnosis: M25.561 (ICD-10-CM) - Acute pain of right knee  · Treatment Diagnosis: M25.561 R Knee Pain; M25.361 R Knee Instability; M62.559 Atrophy of unspecified thigh; Insurance/Certification information:  PT Insurance Information: [employer]; 30 PT visits; auth required  Physician Information:  Referring Practitioner: Dr. Radha Amaya  Has the plan of care been signed (Y/N):        [x]  Yes  []  No     Date of Patient follow up with Physician: 10/8/21      Is this a Progress Report:     []  Yes  [x]  No        If Yes:  Date Range for reporting period:  Beginnin/15/21  Ending:     Progress report will be due (10 Rx or 30 days whichever is less):       Recertification will be due (POC Duration  / 90 days whichever is less): 10/15/21         Visit # Insurance Allowable Auth Required    [facility]    Approved thru 10/15/21 [x]  Yes []  No      OUTCOME MEASURE DATE DEFICIT   [facility] 7/15/21 79% Deficit   [facility] 21 58% Deficit            Latex Allergy:  [x]NO      []YES  Preferred Language for Healthcare:   [x]English       []other:    Pain level: 0/10 at rest,     SUBJECTIVE:  Felt good after last visit - thinks swelling going down. No new issues to report.        OBJECTIVE:        7/15/21 deferred  Flexibility L R Comment   Hamstring         Gastroc         ITB         Quad                      21  ROM PROM AROM Overpressure Comment     L R L R L R     Flexion     140 ERMI    105 \"Tight\"  140       Extension     +3 +3                                                21  Strength L R Comment   Quad 4 3  Poor+ VMO QS   Hamstring         Gastroc         Hip  flexion         Hip abd         Hip Ext         Hip IR         Hip ER            7/15/21  Special Test Results/Comment   Patellar Apprehension NT   Cavazos's Grind Test NT   Tejas's Unable to test due to lack of ROM   Thejacyaly's NT   Joint Line Tenderness (+) MJL   Valgus Laxity (-)   Varus Laxity (-)   Lachmans (+) on R   Drop Back NT   BELA NT   FADDIR NT   Scour NT          Girth L R   Mid Patella 46 46   Suprapatellar 50 48.5   5cm above 58 52.0   15cm above 68 65.5      Reflexes/Sensation: NT              []?Dermatomes/Myotomes intact               []?Reflexes equal and normal bilaterally              []?Other:     Joint mobility:                [x]? Normal: good patellar mobility                      []?Hypo              []?Hyper     Palpation: Moderate TTP at MJL; no TTP at pes anserine infrapatellar fat pads,  LJL tenderness, IT Band and gerdy's tubercle tenderness 8/31/21     Functional Mobility/Transfers: ind but has difficulty getting on/off table     Posture: mild varus     Bandages/Dressings/Incisions: n/a     Gait: (include devices/WB status) Antalgic- moderate and amb with 1 crutch   Orthopedic Special Tests: see above. Functional testing deferred.        RESTRICTIONS/PRECAUTIONS: ACL Tear / Medial meniscus tear / tibial plateau fracture Right knee; also on eliquis for blood clot  Exercises/Interventions:  Exercise/Equipment Resistance/Repetitions Other comments   Stretching       Hamstring 3x30\"    Hip Flexor     ITB     Figure 4     Quad     Inclined Calf 3x30\"               ROM       EOB Self-Assist        Wall Slide     Manual     Biodex Passive     Recumbent Bike 5' Level 3 Seat 7      Hang Weights                Patellar Glides       Medial       Superior       Inferior               Isometrics           Add sets              SLR       Adducton     SLR+               Glutes       Bridges     Supine Clams     S/L Clams 3x10 R Only Green   Side Stepping       Monster walks               CKC       Weight Shift     Calf raises 3x10;      Squatting     CC TKE 2x10x5\" holds 4pl   SL DL               PRE       Extension 3x10; 10# DL RANGE: 90-40   Flexion 3x10; 35# DL RANGE: Avail   Leg Press 3x10 DL; 85# RANGE: 80-10   Cable Column               Balance       Tandem Stance 2x20\" each Airex   Tilt board       SLS 5x15\"                 Other       Treadmill            Manual Interventions          Patient have access to gym? [] Yes  [] No  Patient have equipment at home? [] Yes  [] No       Patient Education:   7/15/21: Patient educated about PT diagnosis, prognosis, and plan of care; educated on role of physical therapy; educated on HEP; educated on anatomy of knee joint; discussed role of PT for ACL tear rehab; discussed rest/ice/elevation for swelling and symptom management; discussed using crutch(es) for ambulation until quad strength improves and she can walk without a limp. HEP:  Patient instructed on HEP on this date with handout provided and all questions answered. Discussions about how to progress sets/reps/resistance as necessary for fatigue and challenge. Patient was instructed to contact PT with any questions or concerns about HEP moving forward. Patient verbally stated she/he understood. Access Code: [email or web address]  URL: [employer]/  Date: 09/13/2021  Prepared by:  Abram Foss    Exercises  Sitting Heel Slide with Towel - 3 x daily - 7 x weekly - 10 reps - 10 hold  Standing Gastroc Stretch on Step - 2 x daily - 7 x weekly - 3 reps - 30\" hold  Seated Table Hamstring Stretch - 2 x daily - 7 x weekly - 5 reps - 30\" hold  Long Sitting Quad Set - 10 x daily - 7 x weekly - 10 sets - 10\" hold  Active Straight Leg Raise with Quad Set - 1 x daily - 7 x weekly - 3 sets - 10 reps  Sidelying Hip Abduction - 1 x daily - 7 x weekly - 3 sets - 10 reps  Clamshell - 1 x daily - 7 x weekly - 3 sets - 10 reps  Standing Heel Raise - 1 x daily - 7 x weekly - 3 sets - 10 reps  Standing Terminal Knee Extension with Resistance - 1 x daily - 7 x weekly - 3 sets - 10 reps - 5 hold  Wall Quarter Squat - 1 x daily - 7 x weekly - 15\" hold  Single Leg Stance - 1 x daily - 7 x weekly - 3 sets - 20-30 hold  Step Up - 1 x daily - 7 x weekly - 3 sets - 10 reps  Seated Long Arc Quad (90-45 Degree Range) - 1 x daily - 7 x weekly - 3 sets - 10 reps      Therapeutic Exercise and NMR EXR  [x] (05555) Provided verbal/tactile cueing for activities related to strengthening, flexibility, endurance, ROM for improvements in LE, proximal hip, and core control with self care, mobility, lifting, ambulation. [x] (47354) Provided verbal/tactile cueing for activities related to improving balance, coordination, kinesthetic sense, posture, motor skill, proprioception  to assist with LE, proximal hip, and core control in self care, mobility, lifting, ambulation and eccentric single leg control.      NMR and Therapeutic Activities:    [x] (31456 or 09575) Provided verbal/tactile cueing for activities related to improving balance, coordination, kinesthetic sense, posture, motor skill, proprioception and motor activation to allow for proper function of core, proximal hip and LE with self care and ADLs  [] (66513) Gait Re-education- Provided training and instruction to the patient for proper LE, core and proximal hip recruitment and positioning and eccentric body weight control with ambulation re-education including up and down stairs     Home Exercise Program:    [x] (20613) Reviewed/Progressed HEP activities related to strengthening, flexibility, endurance, ROM of core, proximal hip and LE for functional self-care, mobility, lifting and ambulation/stair navigation   [] (65849)Reviewed/Progressed HEP activities related to improving balance, coordination, kinesthetic sense, posture, motor skill, proprioception of core, proximal hip and LE for self care, mobility, lifting, and ambulation/stair navigation      Manual Treatments:  PROM / STM / Oscillations-Mobs:  G-I, II, III, IV (PA's, Inf., Post.)  [x] (26522) Provided manual therapy to mobilize LE, proximal hip and/or LS spine soft tissue/joints for the purpose of modulating pain, promoting relaxation,  increasing ROM, reducing/eliminating soft tissue swelling/inflammation/restriction, improving soft tissue extensibility and allowing for proper ROM for normal function with self care, mobility, lifting and ambulation. Modalities: None 9/27    Charges:  Timed Code Treatment Minutes: 40   Total Treatment Minutes: 40   536-325     [] EVAL (LOW) 21028 (typically 20 minutes face-to-face)  [] EVAL (MOD) 59192 (typically 30 minutes face-to-face)  [] EVAL (HIGH) 20144 (typically 45 minutes face-to-face)  [] RE-EVAL   [x] UK(03211) x  1  [] IONTO  [x] NMR (85083) x     [] VASO  [] Manual (32004) x      [] Other:  [x] TA x   1   [] Mech Traction (18546)  [] ES(attended) (88498)      [] ES (un) (69700):     GOALS:   Patient stated goal: Return to full work duties    [] Progressing: [] Met: [] Not Met: [] Adjusted    Therapist goals for Patient:   Short Term Goals: To be achieved in: 2 weeks  1. Independent in HEP and progression per patient tolerance, in order to prevent re-injury. [] Progressing: [] Met: [] Not Met: [] Adjusted   2. Patient will have a decrease in pain to facilitate improvement in movement, function, and ADLs as indicated by Functional Deficits. [] Progressing: [] Met: [] Not Met: [] Adjusted    Long Term Goals: To be achieved in: 12 weeks  1. Disability index score of 39.5% or less for the [facility] to assist with reaching prior level of function. [] Progressing: [] Met: [] Not Met: [] Adjusted  2. Patient will demonstrate increased AROM to 0-120 to allow for proper joint functioning as indicated by patients Functional Deficits. [] Progressing: [] Met: [] Not Met: [] Adjusted  3. Patient will demonstrate an increase in Strength to 4+/5 in BLE to allow for proper functional mobility as indicated by patients Functional Deficits.    [] Progressing: [] Met: [] Not Met: [] Adjusted  4. Patient will return to ADL and other functional activities without increased symptoms or restriction. [] Progressing: [] Met: [] Not Met: [] Adjusted  5. Patient will ascend/descend a flight of stairs independently without pain or giving away in knee. (patient specific functional goal)    [] Progressing: [] Met: [] Not Met: [] Adjusted     Overall Progression Towards Functional goals/ Treatment Progress Update:  [x] Patient is progressing as expected towards functional goals listed. [] Progression is slowed due to complexities/Impairments listed. [] Progression has been slowed due to co-morbidities. [] Plan just implemented, too soon to assess goals progression <30days   [] Goals require adjustment due to lack of progress  [] Patient is not progressing as expected and requires additional follow up with physician  [] Other    Prognosis for POC: [x] Good [] Fair  [] Poor      Patient requires continued skilled intervention: [x] Yes  [] No    Treatment/Activity Tolerance:  [x] Patient able to complete treatment  [] Patient limited by fatigue  [] Patient limited by pain     [] Patient limited by other medical complications  [] Other: Added back in PRE machines today without issue. Significant weakness still present as evident by quad fatigue with just 10# on PRE extensions. Showing improvement in neuromuscular control with ability to do SL balance today without tapping table. Will continue to progress patient per tolerance to address on-going deficits in ROM, strength, flexibility, endurance and neuromuscular control to allow for safe return to PLOF.         PLAN: See eval  [] Continue per plan of care [] Alter current plan (see comments above)  [x] Plan of care initiated [] Hold pending MD visit [] Discharge      Electronically signed by:  Alejandro Skaggs, PT, DPT, OCS    Note: If patient does not return for scheduled/ recommended follow up visits, this note will serve as a discharge from care along with most recent update on progress.

## 2021-10-04 ENCOUNTER — HOSPITAL ENCOUNTER (OUTPATIENT)
Dept: PHYSICAL THERAPY | Age: 41
Setting detail: THERAPIES SERIES
Discharge: HOME | End: 2021-10-04
Payer: COMMERCIAL

## 2021-10-04 PROCEDURE — 97110 THERAPEUTIC EXERCISES: CPT

## 2021-10-04 PROCEDURE — 97112 NEUROMUSCULAR REEDUCATION: CPT

## 2021-10-04 PROCEDURE — 97530 THERAPEUTIC ACTIVITIES: CPT

## 2021-10-04 NOTE — FLOWSHEET NOTE
[address] and [facility], [place]        Physical Therapy Daily Treatment Note  Date:  10/4/2021    Patient Name:  Tonya Ayers"  :  1980  MRN: [id number]  Restrictions/Precautions:    Medical/Treatment Diagnosis Information:  · Diagnosis: M25.561 (ICD-10-CM) - Acute pain of right knee  · Treatment Diagnosis: M25.561 R Knee Pain; M25.361 R Knee Instability; M62.559 Atrophy of unspecified thigh; Insurance/Certification information:  PT Insurance Information: [facility]; 30 PT visits; auth required  Physician Information:  Referring Practitioner: Dr. Cisneros   Has the plan of care been signed (Y/N):        [x]  Yes  []  No     Date of Patient follow up with Physician: 10/8/21      Is this a Progress Report:     []  Yes  [x]  No        If Yes:  Date Range for reporting period:  Beginnin/15/21  Ending:     Progress report will be due (10 Rx or 30 days whichever is less):       Recertification will be due (POC Duration  / 90 days whichever is less): 10/15/21         Visit # Insurance Allowable Auth Required   10 30 VPCY    Approved thru 10/15/21 [x]  Yes []  No      OUTCOME MEASURE DATE DEFICIT   WOMAC 7/15/21 79% Deficit   WOMAC 21 58% Deficit            Latex Allergy:  [x]NO      []YES  Preferred Language for Healthcare:   [x]English       []other:    Pain level: 0/10 at rest,     SUBJECTIVE:  Feeling stronger and more stability in knee. No pain after last visit.       OBJECTIVE:        7/15/21 deferred  Flexibility L R Comment   Hamstring         Gastroc         ITB         Quad                      21  ROM PROM AROM Overpressure Comment     L R L R L R     Flexion     140 ERMI    105 \"Tight\"  140       Extension     +3 +3                                                21  Strength L R Comment   Quad 4 3  Poor+ VMO QS   Hamstring         Gastroc         Hip  flexion         Hip abd         Hip Ext         Hip IR         Hip ER          7/15/21  Special Test Results/Comment   Patellar Apprehension NT   Cavazos's Grind Test NT   Tejas's Unable to test due to lack of ROM   Thessaly's NT   Joint Line Tenderness (+) MJL   Valgus Laxity (-)   Varus Laxity (-)   Lachmans (+) on R   Drop Back NT   BELA NT   FADDIR NT   Scour NT          Girth L R   Mid Patella 46 46   Suprapatellar 50 48.5   5cm above 58 52.0   15cm above 68 65.5      Reflexes/Sensation: NT              []?Dermatomes/Myotomes intact               []?Reflexes equal and normal bilaterally              []?Other:     Joint mobility:                [x]? Normal: good patellar mobility                      []?Hypo              []?Hyper     Palpation: Moderate TTP at MJL; no TTP at pes anserine infrapatellar fat pads,  LJL tenderness, IT Band and gerdy's tubercle tenderness 8/31/21     Functional Mobility/Transfers: ind but has difficulty getting on/off table     Posture: mild varus     Bandages/Dressings/Incisions: n/a     Gait: (include devices/WB status) Antalgic- moderate and amb with 1 crutch   Orthopedic Special Tests: see above. Functional testing deferred.        RESTRICTIONS/PRECAUTIONS: ACL Tear / Medial meniscus tear / tibial plateau fracture Right knee; also on eliquis for blood clot  Exercises/Interventions:  Exercise/Equipment Resistance/Repetitions Other comments   Stretching       Hamstring 3x30\"    Hip Flexor     ITB     Figure 4     Quad     Inclined Calf 3x30\"               ROM       EOB Self-Assist        Wall Slide     Manual     Biodex Passive     Recumbent Bike 5' Level 3 Seat 7      Hang Weights                Patellar Glides       Medial       Superior       Inferior               Isometrics           Add sets              SLR       Supine 3x10 1#   Prone 3x10 off EOB 1#   Abduction 3x10 1#   Adducton     SLR+               Glutes       Bridges     Supine Clams     S/L Clams 3x10 R Only Green   Side Stepping       Monster walks               CKC       Weight Shift Calf raises 3x10;  NPV    Squatting NPV    CC TKE 2x10x5\" holds 5pl   SL DL               PRE       Extension 3x10 + 1x5; 10# DL RANGE: 90-40   Flexion 4x10; 35# DL RANGE: Avail   Leg Press 4x10 DL; 85# RANGE: 80-10   Cable Column               Balance       Tilt board x20 taps, x20\" hold Media/lateral   SLS 5x10\" Airex               Other       Treadmill            Manual Interventions          Patient have access to gym? [] Yes  [] No  Patient have equipment at home? [] Yes  [] No       Patient Education:   7/15/21: Patient educated about PT diagnosis, prognosis, and plan of care; educated on role of physical therapy; educated on HEP; educated on anatomy of knee joint; discussed role of PT for ACL tear rehab; discussed rest/ice/elevation for swelling and symptom management; discussed using crutch(es) for ambulation until quad strength improves and she can walk without a limp. HEP:  Patient instructed on HEP on this date with handout provided and all questions answered. Discussions about how to progress sets/reps/resistance as necessary for fatigue and challenge. Patient was instructed to contact PT with any questions or concerns about HEP moving forward. Patient verbally stated she/he understood. Access Code: [email or web address]  URL: [employer]/  Date: 09/13/2021  Prepared by:  Abram Foss    Exercises  Sitting Heel Slide with Towel - 3 x daily - 7 x weekly - 10 reps - 10 hold  Standing Gastroc Stretch on Step - 2 x daily - 7 x weekly - 3 reps - 30\" hold  Seated Table Hamstring Stretch - 2 x daily - 7 x weekly - 5 reps - 30\" hold  Long Sitting Quad Set - 10 x daily - 7 x weekly - 10 sets - 10\" hold  Active Straight Leg Raise with Quad Set - 1 x daily - 7 x weekly - 3 sets - 10 reps  Sidelying Hip Abduction - 1 x daily - 7 x weekly - 3 sets - 10 reps  Clamshell - 1 x daily - 7 x weekly - 3 sets - 10 reps  Standing Heel Raise - 1 x daily - 7 x weekly - 3 sets - 10 reps  Standing Terminal Knee Oscillations-Mobs:  G-I, II, III, IV (PA's, Inf., Post.)  [x] (56995) Provided manual therapy to mobilize LE, proximal hip and/or LS spine soft tissue/joints for the purpose of modulating pain, promoting relaxation,  increasing ROM, reducing/eliminating soft tissue swelling/inflammation/restriction, improving soft tissue extensibility and allowing for proper ROM for normal function with self care, mobility, lifting and ambulation. Modalities: None     Charges:  Timed Code Treatment Minutes: 40   Total Treatment Minutes: 40   218-305     [] EVAL (LOW) 74598 (typically 20 minutes face-to-face)  [] EVAL (MOD) 29538 (typically 30 minutes face-to-face)  [] EVAL (HIGH) 58379 (typically 45 minutes face-to-face)  [] RE-EVAL   [x] CQ(31223) x  1  [] IONTO  [x] NMR (83103) x     [] VASO  [] Manual (48856) x      [] Other:  [x] TA x   1   [] Mech Traction (72404)  [] ES(attended) (94972)      [] ES (un) (31380):     GOALS:   Patient stated goal: Return to full work duties    [] Progressing: [] Met: [] Not Met: [] Adjusted    Therapist goals for Patient:   Short Term Goals: To be achieved in: 2 weeks  1. Independent in HEP and progression per patient tolerance, in order to prevent re-injury. [] Progressing: [] Met: [] Not Met: [] Adjusted   2. Patient will have a decrease in pain to facilitate improvement in movement, function, and ADLs as indicated by Functional Deficits. [] Progressing: [] Met: [] Not Met: [] Adjusted    Long Term Goals: To be achieved in: 12 weeks  1. Disability index score of 39.5% or less for the [facility] to assist with reaching prior level of function. [] Progressing: [] Met: [] Not Met: [] Adjusted  2. Patient will demonstrate increased AROM to 0-120 to allow for proper joint functioning as indicated by patients Functional Deficits. [] Progressing: [] Met: [] Not Met: [] Adjusted  3.  Patient will demonstrate an increase in Strength to 4+/5 in BLE to allow for proper functional mobility as PT, DPT, OCS    Note: If patient does not return for scheduled/ recommended follow up visits, this note will serve as a discharge from care along with most recent update on progress.

## 2021-10-07 ENCOUNTER — HOSPITAL ENCOUNTER (OUTPATIENT)
Dept: PHYSICAL THERAPY | Age: 41
Setting detail: THERAPIES SERIES
Discharge: HOME | End: 2021-10-07
Payer: COMMERCIAL

## 2021-10-07 PROCEDURE — 97112 NEUROMUSCULAR REEDUCATION: CPT

## 2021-10-07 PROCEDURE — 97530 THERAPEUTIC ACTIVITIES: CPT

## 2021-10-07 PROCEDURE — 97110 THERAPEUTIC EXERCISES: CPT

## 2021-10-07 NOTE — PLAN OF CARE
[address], [place]     Physical Therapy Re-Certification Plan of Care    Dear  Dr. Faythe Lesch,    We had the pleasure of treating the following patient for physical therapy services at [address] [place] [address]. A summary of our findings can be found in the updated assessment below. This includes our plan of care. If you have any questions or concerns regarding these findings, please do not hesitate to contact me at [phone]. Thank you for the referral.     Physician Signature:________________________________Date:__________________  By signing above (or electronic signature), therapists plan is approved by physician      Overall Response to Treatment:   [x]Patient is responding well to treatment and improvement is noted with regards  to goals   []Patient should continue to improve in reasonable time if they continue HEP   []Patient has plateaued and is no longer responding to skilled PT intervention    []Patient is getting worse and would benefit from return to referring MD   []Patient unable to adhere to initial POC   [x]Other: Progressing well. Has restored full AROM without pain/stiffness. Strength improving in BLE but mild quad and hip weakness remains. Beginning to ambulate without her crutch but is hesitant at times. Walks slowly to avoid knee giving away but does show good gait mechanics and no instability in visits. Recommend patient continue PT 2x/week for 8 more weeks to work on her strength, endurance, and dynamic control of her knee to allow her a safe return to work and PLOF.     Date range of Visits: 7/15/21-10/7/21  Total Visits: 11      Physical Therapy Daily Treatment Note  Date:  10/7/2021    Patient Name:  Heavenly Burns"  :  1980  MRN: [id number]  Restrictions/Precautions:    Medical/Treatment Diagnosis Information:  · Diagnosis: M25.561 (ICD-10-CM) - Acute pain of right knee  · Treatment Diagnosis: M25.561 R Knee Pain; M25.361 R Knee Instability; M62.559 Atrophy of unspecified thigh; Insurance/Certification information:  PT Insurance Information: [employer]; 30 PT visits; auth required  Physician Information:  Referring Practitioner: Dr. Corinna Evans  Has the plan of care been signed (Y/N):        [x]  Yes  []  No     Date of Patient follow up with Physician: 10/8/21      Is this a Progress Report:     [x]  Yes  []  No        If Yes:  Date Range for reporting period:  Beginnin/15/21  Ending: 10/7/21    Progress report will be due (10 Rx or 30 days whichever is less):       Recertification will be due (POC Duration  / 90 days whichever is less): 21        Visit # Insurance Allowable Auth Required    VPCY    Approved thru 10/15/21 [x]  Yes []  No      OUTCOME MEASURE DATE DEFICIT   WOMAC 7/15/21 79% Deficit   WOMAC 21 58% Deficit   WOMAC 10/7/21 50% Deficit            Latex Allergy:  [x]NO      []YES  Preferred Language for Healthcare:   [x]English       []other:    Pain level: 0/10 at rest,     SUBJECTIVE:  Continues to do well. Walking more without crutch - presents today without it in hand. Had one moment of feeling of her knee about to give out but it did not.       OBJECTIVE:        7/15/21 deferred  Flexibility L R Comment   Hamstring         Gastroc         ITB         Quad                      10/7/21  ROM PROM AROM Overpressure Comment     L R L R L R     Flexion    145   140       Extension     +3 +3                                                10/7/21  Strength L R Comment   Quad 5 4    Hamstring 4+ 4+     Gastroc         Hip  flexion         Hip abd 5 4+     Hip Ext 4+ 4+     Hip IR         Hip ER            10/7/21  Special Test Results/Comment   Patellar Apprehension NT   Cavazos's Grind Test NT   Tejas's Unable to test due to lack of ROM   Thessaly's NT   Joint Line Tenderness Neg   Valgus Laxity (-)   Varus Laxity (-)   Lachmans (+) on R   Drop Back NT   BELA NT   FADDIR NT   Scour NT        Girth L R   Mid Patella 46 46   Suprapatellar 50 48.5   5cm above 58 52.0   15cm above 68 65.5      Reflexes/Sensation: NT              []?Dermatomes/Myotomes intact               []?Reflexes equal and normal bilaterally              []?Other:     Joint mobility:                [x]? Normal: good patellar mobility                      []?Hypo              []?Hyper     Palpation: Mild TTP at MJL; no TTP at pes anserine infrapatellar fat pads,  LJL tenderness, IT Band and gerdy's tubercle tenderness 10/7/21     Functional Mobility/Transfers: ind 10/7/21     Posture: mild varus     Bandages/Dressings/Incisions: n/a     Gait: (include devices/WB status) slow, deliberate, but without AD 10/7/21    Orthopedic Special Tests: see above. Functional testing deferred.        RESTRICTIONS/PRECAUTIONS: ACL Tear / Medial meniscus tear / tibial plateau fracture Right knee; also on eliquis for blood clot  Exercises/Interventions:  Exercise/Equipment Resistance/Repetitions Other comments   Stretching       Hamstring 3x30\"    Hip Flexor     ITB     Figure 4     Quad     Inclined Calf 3x30\"               ROM       EOB Self-Assist        Wall Slide     Manual     Biodex Passive     Recumbent Bike 5' Level 3 Seat 7      Hang Weights                Patellar Glides       Medial       Superior       Inferior               Isometrics           Add sets              SLR       Supine 3x10 1#   Prone 3x10 off EOB 1#   Abduction 3x10 1#   Adducton     SLR+               Glutes       Bridges     Supine Clams     S/L Clams 3x10 R Only Green   Side Stepping       Monster walks               CKC       Weight Shift     Calf raises 3x10;  NPV    Squatting 2x10    CC TKE 2x10x5\" holds 5pl   SL DL               PRE       Extension 3x10; 10# DL RANGE: 90-40   Flexion 3x10; 40# DL RANGE: Avail   Leg Press 3x10 DL; 90# RANGE: 80-10   Cable Column               Balance       Tilt board x20 taps, x20\" hold Media/lateral   SLS 5x10\" Airex Other       Treadmill            Manual Interventions          Patient have access to gym? [] Yes  [] No  Patient have equipment at home? [] Yes  [] No       Patient Education:   7/15/21: Patient educated about PT diagnosis, prognosis, and plan of care; educated on role of physical therapy; educated on HEP; educated on anatomy of knee joint; discussed role of PT for ACL tear rehab; discussed rest/ice/elevation for swelling and symptom management; discussed using crutch(es) for ambulation until quad strength improves and she can walk without a limp. HEP:  Patient instructed on HEP on this date with handout provided and all questions answered. Discussions about how to progress sets/reps/resistance as necessary for fatigue and challenge. Patient was instructed to contact PT with any questions or concerns about HEP moving forward. Patient verbally stated she/he understood. Access Code: [email or web address]  URL: [employer]/  Date: 09/13/2021  Prepared by:  [facility]    Exercises  Sitting Heel Slide with Towel - 3 x daily - 7 x weekly - 10 reps - 10 hold  Standing Gastroc Stretch on Step - 2 x daily - 7 x weekly - 3 reps - 30\" hold  Seated Table Hamstring Stretch - 2 x daily - 7 x weekly - 5 reps - 30\" hold  Long Sitting Quad Set - 10 x daily - 7 x weekly - 10 sets - 10\" hold  Active Straight Leg Raise with Quad Set - 1 x daily - 7 x weekly - 3 sets - 10 reps  Sidelying Hip Abduction - 1 x daily - 7 x weekly - 3 sets - 10 reps  Clamshell - 1 x daily - 7 x weekly - 3 sets - 10 reps  Standing Heel Raise - 1 x daily - 7 x weekly - 3 sets - 10 reps  Standing Terminal Knee Extension with Resistance - 1 x daily - 7 x weekly - 3 sets - 10 reps - 5 hold  Wall Quarter Squat - 1 x daily - 7 x weekly - 15\" hold  Single Leg Stance - 1 x daily - 7 x weekly - 3 sets - 20-30 hold  Step Up - 1 x daily - 7 x weekly - 3 sets - 10 reps  Seated Long Arc Quad (90-45 Degree Range) - 1 x daily - 7 x weekly - 3 sets - 10 reps      Therapeutic Exercise and NMR EXR  [x] (31365) Provided verbal/tactile cueing for activities related to strengthening, flexibility, endurance, ROM for improvements in LE, proximal hip, and core control with self care, mobility, lifting, ambulation. [x] (80904) Provided verbal/tactile cueing for activities related to improving balance, coordination, kinesthetic sense, posture, motor skill, proprioception  to assist with LE, proximal hip, and core control in self care, mobility, lifting, ambulation and eccentric single leg control.      NMR and Therapeutic Activities:    [x] (06233 or 96985) Provided verbal/tactile cueing for activities related to improving balance, coordination, kinesthetic sense, posture, motor skill, proprioception and motor activation to allow for proper function of core, proximal hip and LE with self care and ADLs  [] (04344) Gait Re-education- Provided training and instruction to the patient for proper LE, core and proximal hip recruitment and positioning and eccentric body weight control with ambulation re-education including up and down stairs     Home Exercise Program:    [x] (95004) Reviewed/Progressed HEP activities related to strengthening, flexibility, endurance, ROM of core, proximal hip and LE for functional self-care, mobility, lifting and ambulation/stair navigation   [] (08027)Reviewed/Progressed HEP activities related to improving balance, coordination, kinesthetic sense, posture, motor skill, proprioception of core, proximal hip and LE for self care, mobility, lifting, and ambulation/stair navigation      Manual Treatments:  PROM / STM / Oscillations-Mobs:  G-I, II, III, IV (PA's, Inf., Post.)  [x] (10737) Provided manual therapy to mobilize LE, proximal hip and/or LS spine soft tissue/joints for the purpose of modulating pain, promoting relaxation,  increasing ROM, reducing/eliminating soft tissue swelling/inflammation/restriction, improving soft tissue extensibility and allowing for proper ROM for normal function with self care, mobility, lifting and ambulation. Modalities: None     Charges:  Timed Code Treatment Minutes: 50   Total Treatment Minutes: 50   013-417     [] EVAL (LOW) 59903 (typically 20 minutes face-to-face)  [] EVAL (MOD) 16647 (typically 30 minutes face-to-face)  [] EVAL (HIGH) 84599 (typically 45 minutes face-to-face)  [] RE-EVAL   [x] NO(05483) x  1  [] IONTO  [x] NMR (65993) x     [] VASO  [] Manual (56350) x      [] Other:  [x] TA x   1   [] Mech Traction (18363)  [] ES(attended) (24988)      [] ES (un) (19515):     GOALS:   Patient stated goal: Return to full work duties    [x] Progressing: [] Met: [] Not Met: [] Adjusted    Therapist goals for Patient:   Short Term Goals: To be achieved in: 2 weeks  1. Independent in HEP and progression per patient tolerance, in order to prevent re-injury. [] Progressing: [x] Met: [] Not Met: [] Adjusted   2. Patient will have a decrease in pain to facilitate improvement in movement, function, and ADLs as indicated by Functional Deficits. [] Progressing: [x] Met: [] Not Met: [] Adjusted    Long Term Goals: To be achieved in: 12 weeks  1. Disability index score of 39.5% or less for the [facility] to assist with reaching prior level of function. [x] Progressing: [] Met: [] Not Met: [] Adjusted  2. Patient will demonstrate increased AROM to 0-120 to allow for proper joint functioning as indicated by patients Functional Deficits. [] Progressing: [x] Met: [] Not Met: [] Adjusted  3. Patient will demonstrate an increase in Strength to 4+/5 in BLE to allow for proper functional mobility as indicated by patients Functional Deficits. [] Progressing: [x] Met: [] Not Met: [] Adjusted  4. Patient will return to ADL and other functional activities without increased symptoms or restriction. [x] Progressing: [] Met: [] Not Met: [] Adjusted  5.  Patient will ascend/descend a flight of stairs independently without pain or giving away in knee. (patient specific functional goal)    [x] Progressing: [] Met: [] Not Met: [] Adjusted     Overall Progression Towards Functional goals/ Treatment Progress Update:  [x] Patient is progressing as expected towards functional goals listed. [] Progression is slowed due to complexities/Impairments listed. [] Progression has been slowed due to co-morbidities. [] Plan just implemented, too soon to assess goals progression <30days   [] Goals require adjustment due to lack of progress  [] Patient is not progressing as expected and requires additional follow up with physician  [] Other    Prognosis for POC: [x] Good [] Fair  [] Poor      Patient requires continued skilled intervention: [x] Yes  [] No    Treatment/Activity Tolerance:  [x] Patient able to complete treatment  [] Patient limited by fatigue  [] Patient limited by pain     [] Patient limited by other medical complications  [] Other: See above. Will continue to progress patient per tolerance to address on-going deficits in ROM, strength, flexibility, endurance and neuromuscular control to allow for safe return to PLOF. PLAN: 2x.week for 12 more weeks 10/7/21  [x] Continue per plan of care [] Alter current plan (see comments above)  [] Plan of care initiated [] Hold pending MD visit [] Discharge      Electronically signed by:  Chris Olguin, PT, DPT, OCS    Note: If patient does not return for scheduled/ recommended follow up visits, this note will serve as a discharge from care along with most recent update on progress.

## 2021-10-08 ENCOUNTER — TELEPHONE (OUTPATIENT)
Dept: ORTHOPEDIC SURGERY | Age: 41
End: 2021-10-08

## 2021-10-08 ENCOUNTER — OFFICE VISIT (OUTPATIENT)
Dept: ORTHOPEDIC SURGERY | Age: 41
End: 2021-10-08
Payer: COMMERCIAL

## 2021-10-08 VITALS
HEART RATE: 65 BPM | DIASTOLIC BLOOD PRESSURE: 90 MMHG | HEIGHT: 65 IN | SYSTOLIC BLOOD PRESSURE: 134 MMHG | BODY MASS INDEX: 34.49 KG/M2 | WEIGHT: 207 LBS

## 2021-10-08 DIAGNOSIS — M25.561 ACUTE PAIN OF RIGHT KNEE: Primary | ICD-10-CM

## 2021-10-08 DIAGNOSIS — S83.511A CHRONIC RUPTURE OF ACL OF RIGHT KNEE: ICD-10-CM

## 2021-10-08 PROCEDURE — G8417 CALC BMI ABV UP PARAM F/U: HCPCS | Performed by: ORTHOPAEDIC SURGERY

## 2021-10-08 PROCEDURE — G8427 DOCREV CUR MEDS BY ELIG CLIN: HCPCS | Performed by: ORTHOPAEDIC SURGERY

## 2021-10-08 PROCEDURE — 4004F PT TOBACCO SCREEN RCVD TLK: CPT | Performed by: ORTHOPAEDIC SURGERY

## 2021-10-08 PROCEDURE — 99213 OFFICE O/P EST LOW 20 MIN: CPT | Performed by: ORTHOPAEDIC SURGERY

## 2021-10-08 PROCEDURE — G8484 FLU IMMUNIZE NO ADMIN: HCPCS | Performed by: ORTHOPAEDIC SURGERY

## 2021-10-08 RX ORDER — TOPIRAMATE 50 MG/1
TABLET, FILM COATED ORAL
COMMUNITY
Start: 2021-09-01 | End: 2022-06-16

## 2021-10-08 RX ORDER — ALLOPURINOL 300 MG/1
TABLET ORAL
COMMUNITY
Start: 2021-10-04 | End: 2022-06-16

## 2021-10-08 RX ORDER — APIXABAN 5 MG/1
TABLET, FILM COATED ORAL
COMMUNITY
Start: 2021-09-19 | End: 2021-11-08

## 2021-10-08 NOTE — PROGRESS NOTES
Creston Baumgarten \"Kim\" returns today for her right knee. In general she is doing better but she still does not really trust her knee all the way. She is concerned that it is going to feel unsteady. General Exam:    Vitals: Blood pressure (!) 134/90, pulse 65, height 5' 5\" (1.651 m), weight 207 lb (93.9 kg), not currently breastfeeding. Constitutional: Patient is adequately groomed with no evidence of malnutrition  Mental Status: The patient is oriented to time, place and person. The patient's mood and affect are appropriate. She walks with a normal gait today. Right knee has mildly increased anterior translation but no effusion. Range of motion 0 to 120 degrees. Calf is soft. Assessment: Overall I think she is making good progress. She still not a good candidate for ACL surgery given her size, activity level, and the fact that she has had a blood clot recently. Plan: I believe she would benefit from a ACL functional brace. She will get measured today and will try to get that approved through her insurance. She can resume standing work once that gets fit. He has any concerns about her calf she should follow-up with her primary care provider. Follow-up with me in 4 weeks if she continues therapy. I want her to try to walk without her crutch.

## 2021-10-08 NOTE — TELEPHONE ENCOUNTER
ACL Functional Brace Measurements per [facility]:    Thigh: 26 in  [address]: 20.5 in  Calf: 17.75in

## 2021-10-08 NOTE — LETTER
[facility]  [address] [facility] [place] [address]  Phone: [phone]  Fax: [phone]    Yvette Birmingham MD        October 8, 2021     Patient: Kelley Escobar   YOB: 1980   Date of Visit: 10/8/2021       To Whom It May Concern: It is my medical opinion that Kelley Escobar may return to light duty immediately with the following restrictions: sitting only. Patient will be fit with a custom knee brace. She may resume full duty once she has the right knee brace. If you have any questions or concerns, please don't hesitate to call.     Sincerely,          Yvette Birmingham MD

## 2021-10-11 ENCOUNTER — HOSPITAL ENCOUNTER (OUTPATIENT)
Dept: PHYSICAL THERAPY | Age: 41
Setting detail: THERAPIES SERIES
Discharge: HOME | End: 2021-10-11
Payer: COMMERCIAL

## 2021-10-11 PROCEDURE — 97110 THERAPEUTIC EXERCISES: CPT

## 2021-10-11 PROCEDURE — 97112 NEUROMUSCULAR REEDUCATION: CPT

## 2021-10-11 PROCEDURE — 97530 THERAPEUTIC ACTIVITIES: CPT

## 2021-10-14 ENCOUNTER — HOSPITAL ENCOUNTER (OUTPATIENT)
Dept: PHYSICAL THERAPY | Age: 41
Setting detail: THERAPIES SERIES
Discharge: HOME | End: 2021-10-14
Payer: COMMERCIAL

## 2021-10-14 NOTE — FLOWSHEET NOTE
[address] [facility]    [facility] Therapy  Cancellation/No-show Note  Patient Name:  Humphrey Horne  :  1980   Date:  10/14/2021  Cancelled visits to date: 2  No-shows to date: 1    For today's appointment patient:  []  Cancelled  []  Rescheduled appointment  [x]  No-show     Reason given by patient:  []  Patient ill  []  Conflicting appointment   []  No transportation    []  Conflict with work  [x]  No reason given  []  Other:     Comments:      Electronically signed by:  Amanda Stevens, PT, DPT, OCS

## 2021-10-18 ENCOUNTER — HOSPITAL ENCOUNTER (OUTPATIENT)
Dept: PHYSICAL THERAPY | Age: 41
Setting detail: THERAPIES SERIES
Discharge: HOME | End: 2021-10-18
Payer: COMMERCIAL

## 2021-10-18 NOTE — FLOWSHEET NOTE
[facility] [address] [facility], [place]    Physical Therapy  Cancellation/No-show Note  Patient Name:  Jean-Paul Mcknight  :  1980   Date:  10/18/2021  Cancelled visits to date: 3  No-shows to date: 1    For today's appointment patient:  [x]  Cancelled  []  Rescheduled appointment  []  No-show     Reason given by patient:  []  Patient ill  []  Conflicting appointment   []  No transportation    []  Conflict with work  []  No reason given  [x]  Other: Further PT visits not yet authorized so visit canceled; scheduled for later this week pending authorization.     Comments:      Electronically signed by:  Antoinette Palacio, PT, DPT, OCS

## 2021-10-19 ENCOUNTER — TELEPHONE (OUTPATIENT)
Dept: ORTHOPEDIC SURGERY | Age: 41
End: 2021-10-19

## 2021-10-19 NOTE — TELEPHONE ENCOUNTER
Called patient to discuss approval of knee brace through primary insurance. Left vm. Advsied that brace is estimated to be covered at 100% and that I will order to BTO office today. Will call to schedule a fitting once it has arrived in shipment.

## 2021-10-21 ENCOUNTER — HOSPITAL ENCOUNTER (OUTPATIENT)
Dept: PHYSICAL THERAPY | Age: 41
Setting detail: THERAPIES SERIES
Discharge: HOME | End: 2021-10-21
Payer: COMMERCIAL

## 2021-10-21 ENCOUNTER — TELEPHONE (OUTPATIENT)
Dept: ORTHOPEDIC SURGERY | Age: 41
End: 2021-10-21

## 2021-10-21 PROCEDURE — 97112 NEUROMUSCULAR REEDUCATION: CPT

## 2021-10-21 PROCEDURE — 97530 THERAPEUTIC ACTIVITIES: CPT

## 2021-10-21 PROCEDURE — 97110 THERAPEUTIC EXERCISES: CPT

## 2021-10-21 NOTE — TELEPHONE ENCOUNTER
I received a call from our BTO office that patient was in office to  brace. Explained to  that patient needs to be fit by DME staff and would need to schedule appointment, as was directed on the message left on patient's VM by our staff. No DME staff at that location at that time. Offered patient to come back tomorrow for fitting, but patient declined due to a .   gave patient [facility] phone number and patient was told to ask for Gerardo Macedo to schedule appointment. I will also have Misti Antonio try to follow up with patient with a phone call.

## 2021-10-21 NOTE — FLOWSHEET NOTE
[address] [facility], [place]        Physical Therapy Daily Treatment Note  Date:  10/21/2021    Patient Name:  Chinedu Pollard"  :  1980  MRN: [id number]  Restrictions/Precautions:    Medical/Treatment Diagnosis Information:  · Diagnosis: M25.561 (ICD-10-CM) - Acute pain of right knee  · Treatment Diagnosis: M25.561 R Knee Pain; M25.361 R Knee Instability; M62.559 Atrophy of unspecified thigh; Insurance/Certification information:  [facility]; [facility] visits; auth required  Physician Information:  Referring Practitioner: Dr. Deb Smith  Has the plan of care been signed (Y/N):        [x]  Yes  []  No     Date of Patient follow up with Physician: 10/8/21      Is this a Progress Report:     []  Yes  [x]  No        If Yes:  Date Range for reporting period:  Beginnin/15/21  Ending:     Progress report will be due (10 Rx or 30 days whichever is less):       Recertification will be due (POC Duration  / 90 days whichever is less): 21        Visit # Insurance Allowable Auth Required   1 of 20      12 previous 20 PT approved 10/20-      30 VPCY [x]  Yes []  No      OUTCOME MEASURE DATE DEFICIT   WOMAC 7/15/21 79% Deficit   WOMAC 21 58% Deficit   WOMAC 10/7/21 50% Deficit            Latex Allergy:  [x]NO      []YES  Preferred Language for Healthcare:   [x]English       []other:    Pain level: 0/10 at rest,     SUBJECTIVE:  Doing well. No longer using crutch except long distance walking. Knee feeling good. No pain or popping.  Got approved for new brace and getting fit today after PT.       OBJECTIVE:        7/15/21 deferred  Flexibility L R Comment   Hamstring         Gastroc         ITB         Quad                      10/7/21  ROM PROM AROM Overpressure Comment     L R L R L R     Flexion    145   140       Extension     +3 +3                                                10/7/21  Strength L R Comment   Quad 5 4    Hamstring 4+ 4+     Gastroc         Hip  flexion         Hip abd 5 4+     Hip Ext 4+ 4+     Hip IR         Hip ER            10/7/21  Special Test Results/Comment   Patellar Apprehension NT   Cavazos's Grind Test NT   Tejas's Unable to test due to lack of ROM   Thedunia's NT   Joint Line Tenderness Neg   Valgus Laxity (-)   Varus Laxity (-)   Lachmans (+) on R   Drop Back NT   BELA NT   FADDIR NT   Scour NT          Girth L R   Mid Patella 46 46   Suprapatellar 50 48.5   5cm above 58 52.0   15cm above 68 65.5      Reflexes/Sensation: NT              []?Dermatomes/Myotomes intact               []?Reflexes equal and normal bilaterally              []?Other:     Joint mobility:                [x]? Normal: good patellar mobility                      []?Hypo              []?Hyper     Palpation: Mild TTP at MJL; no TTP at pes anserine infrapatellar fat pads,  LJL tenderness, IT Band and gerdy's tubercle tenderness 10/7/21     Functional Mobility/Transfers: ind 10/7/21     Posture: mild varus     Bandages/Dressings/Incisions: n/a     Gait: (include devices/WB status) slow, deliberate, but without AD 10/7/21    Orthopedic Special Tests: see above. Functional testing deferred.        RESTRICTIONS/PRECAUTIONS: ACL Tear / Medial meniscus tear / tibial plateau fracture Right knee; also on eliquis for blood clot  Exercises/Interventions:  Exercise/Equipment Resistance/Repetitions Other comments   Stretching       Hamstring 3x30\"    Hip Flexor     ITB     Figure 4     Quad     Inclined Calf 3x30\"               ROM       EOB Self-Assist        Wall Slide     Manual     Biodex Passive     Recumbent Bike 5' Level 5 Seat 7      Hang Weights                Patellar Glides       Medial       Superior       Inferior               Isometrics           Add sets              SLR  HEP     Adducton     SLR+               Glutes       Bridges     Supine Clams     Side Stepping       Monster walks       Steam Boats 3x10 each leg: Abduction, Extension Green loop at ankles           CKC       Weight Shift     Wall sits 5xfatigue     Squatting 3x10 10# Kettlebell goblet squat   CC TKE 2x10x5\" holds 5pl   Stool scoot 1 lap down/back             PRE       Extension 3x10; 15# DL RANGE: 90-40   Flexion 3x10 + 1x5; 40# DL RANGE: Avail   Leg Press 5x10 DL; 100#  Increase NPV RANGE: 80-10   Cable Column               Balance       Tilt board 10x10\" holds in squat Medial/lateral + Anterior/posterior   SLS 3x10 chest presses with 2# med ball Airex   Biodex balance Maze, L10, Medium x 1             Other       Treadmill            Manual Interventions          Patient have access to gym? [] Yes  [] No  Patient have equipment at home? [] Yes  [] No       Patient Education:   7/15/21: Patient educated about PT diagnosis, prognosis, and plan of care; educated on role of physical therapy; educated on HEP; educated on anatomy of knee joint; discussed role of PT for ACL tear rehab; discussed rest/ice/elevation for swelling and symptom management; discussed using crutch(es) for ambulation until quad strength improves and she can walk without a limp. HEP:  Patient instructed on HEP on this date with handout provided and all questions answered. Discussions about how to progress sets/reps/resistance as necessary for fatigue and challenge. Patient was instructed to contact PT with any questions or concerns about HEP moving forward. Patient verbally stated she/he understood. Access Code: [email or web address]  URL: [email or web address]  Date: 09/13/2021  Prepared by:  Abram Foss    Exercises  Sitting Heel Slide with Towel - 3 x daily - 7 x weekly - 10 reps - 10 hold  Standing Gastroc Stretch on Step - 2 x daily - 7 x weekly - 3 reps - 30\" hold  Seated Table Hamstring Stretch - 2 x daily - 7 x weekly - 5 reps - 30\" hold  Long Sitting Quad Set - 10 x daily - 7 x weekly - 10 sets - 10\" hold  Active Straight Leg Raise with Quad Set - 1 x daily - 7 x weekly - 3 sets - 10 reps  Sidelying Hip Abduction - 1 x daily - 7 x weekly - 3 sets - 10 reps  Clamshell - 1 x daily - 7 x weekly - 3 sets - 10 reps  Standing Heel Raise - 1 x daily - 7 x weekly - 3 sets - 10 reps  Standing Terminal Knee Extension with Resistance - 1 x daily - 7 x weekly - 3 sets - 10 reps - 5 hold  Wall Quarter Squat - 1 x daily - 7 x weekly - 15\" hold  Single Leg Stance - 1 x daily - 7 x weekly - 3 sets - 20-30 hold  Step Up - 1 x daily - 7 x weekly - 3 sets - 10 reps  Seated Long Arc Quad (90-45 Degree Range) - 1 x daily - 7 x weekly - 3 sets - 10 reps      Therapeutic Exercise and NMR EXR  [x] (80411) Provided verbal/tactile cueing for activities related to strengthening, flexibility, endurance, ROM for improvements in LE, proximal hip, and core control with self care, mobility, lifting, ambulation. [x] (74259) Provided verbal/tactile cueing for activities related to improving balance, coordination, kinesthetic sense, posture, motor skill, proprioception  to assist with LE, proximal hip, and core control in self care, mobility, lifting, ambulation and eccentric single leg control.      NMR and Therapeutic Activities:    [x] (99238 or 96414) Provided verbal/tactile cueing for activities related to improving balance, coordination, kinesthetic sense, posture, motor skill, proprioception and motor activation to allow for proper function of core, proximal hip and LE with self care and ADLs  [] (32857) Gait Re-education- Provided training and instruction to the patient for proper LE, core and proximal hip recruitment and positioning and eccentric body weight control with ambulation re-education including up and down stairs     Home Exercise Program:    [x] (89997) Reviewed/Progressed HEP activities related to strengthening, flexibility, endurance, ROM of core, proximal hip and LE for functional self-care, mobility, lifting and ambulation/stair navigation   [] (76064)Reviewed/Progressed HEP activities related to improving balance, coordination, kinesthetic sense, posture, motor skill, proprioception of core, proximal hip and LE for self care, mobility, lifting, and ambulation/stair navigation      Manual Treatments:  PROM / STM / Oscillations-Mobs:  G-I, II, III, IV (PA's, Inf., Post.)  [x] (80756) Provided manual therapy to mobilize LE, proximal hip and/or LS spine soft tissue/joints for the purpose of modulating pain, promoting relaxation,  increasing ROM, reducing/eliminating soft tissue swelling/inflammation/restriction, improving soft tissue extensibility and allowing for proper ROM for normal function with self care, mobility, lifting and ambulation. Modalities: Declined    Charges:  Timed Code Treatment Minutes: 40   Total Treatment Minutes: 40   [id number]    [] EVAL (LOW) 75095 (typically 20 minutes face-to-face)  [] EVAL (MOD) 84313 (typically 30 minutes face-to-face)  [] EVAL (HIGH) 20321 (typically 45 minutes face-to-face)  [] RE-EVAL   [x] BF(16928) x  1  [] IONTO  [x] NMR (75313) x     [] VASO  [] Manual (64227) x      [] Other:  [x] TA x   1   [] Mech Traction (39353)  [] ES(attended) (99819)      [] ES (un) (23262):     GOALS:   Patient stated goal: Return to full work duties    [x] Progressing: [] Met: [] Not Met: [] Adjusted    Therapist goals for Patient:   Short Term Goals: To be achieved in: 2 weeks  1. Independent in HEP and progression per patient tolerance, in order to prevent re-injury. [] Progressing: [x] Met: [] Not Met: [] Adjusted   2. Patient will have a decrease in pain to facilitate improvement in movement, function, and ADLs as indicated by Functional Deficits. [] Progressing: [x] Met: [] Not Met: [] Adjusted    Long Term Goals: To be achieved in: 12 weeks  1. Disability index score of 39.5% or less for the [facility] to assist with reaching prior level of function. [x] Progressing: [] Met: [] Not Met: [] Adjusted  2.  Patient will demonstrate increased AROM to 0-120 to allow for proper joint functioning

## 2021-10-25 ENCOUNTER — HOSPITAL ENCOUNTER (OUTPATIENT)
Dept: PHYSICAL THERAPY | Age: 41
Setting detail: THERAPIES SERIES
Discharge: HOME | End: 2021-10-25
Payer: COMMERCIAL

## 2021-10-25 PROCEDURE — 97110 THERAPEUTIC EXERCISES: CPT

## 2021-10-25 PROCEDURE — 97112 NEUROMUSCULAR REEDUCATION: CPT

## 2021-10-25 PROCEDURE — 97530 THERAPEUTIC ACTIVITIES: CPT

## 2021-10-25 NOTE — FLOWSHEET NOTE
[address] [facility], [place]        Physical Therapy Daily Treatment Note  Date:  10/25/2021    Patient Name:  Mandie Newman"  :  1980  MRN: [id number]  Restrictions/Precautions:    Medical/Treatment Diagnosis Information:  · Diagnosis: M25.561 (ICD-10-CM) - Acute pain of right knee  · Treatment Diagnosis: M25.561 R Knee Pain; M25.361 R Knee Instability; M62.559 Atrophy of unspecified thigh; Insurance/Certification information:  [facility]; [facility] PT visits; auth required  Physician Information:  Referring Practitioner: Dr. Jn Garrett  Has the plan of care been signed (Y/N):        [x]  Yes  []  No     Date of Patient follow up with Physician: 10/8/21      Is this a Progress Report:     []  Yes  [x]  No        If Yes:  Date Range for reporting period:  Beginnin/15/21  Ending:     Progress report will be due (10 Rx or 30 days whichever is less): 10/8/04      Recertification will be due (POC Duration  / 90 days whichever is less): 21        Visit # Insurance Allowable Auth Required   2 of 20      12 previous 20 PT approved 10/20-      30 VPCY [x]  Yes []  No      OUTCOME MEASURE DATE DEFICIT   WOMAC 7/15/21 79% Deficit   WOMAC 21 58% Deficit   WOMAC 10/7/21 50% Deficit            Latex Allergy:  [x]NO      []YES  Preferred Language for Healthcare:   [x]English       []other:    Pain level: 0/10 at rest,     SUBJECTIVE:  Having increased pain in posterior knee today. Started two days ago without HAL. Feels like a \"pull\" behind knee. No swelling in leg and no pain in calf. Denies any change in activity this weekend.        OBJECTIVE:        7/15/21 deferred  Flexibility L R Comment   Hamstring         Gastroc         ITB         Quad                      10/7/21  ROM PROM AROM Overpressure Comment     L R L R L R     Flexion    145   140       Extension     +3 +3                                                10/7/21  Strength L R Comment   Quad 5 4    Hamstring 4+ 4+     Gastroc         Hip  flexion         Hip abd 5 4+     Hip Ext 4+ 4+     Hip IR         Hip ER            10/7/21  Special Test Results/Comment   Patellar Apprehension NT   Cavazos's Grind Test NT   Tejas's Unable to test due to lack of ROM   Thessaly's NT   Joint Line Tenderness Neg   Valgus Laxity (-)   Varus Laxity (-)   Lachmans (+) on R   Drop Back NT   BELA NT   FADDIR NT   Scour NT          Girth L R   Mid Patella 46 46   Suprapatellar 50 48.5   5cm above 58 52.0   15cm above 68 65.5      Reflexes/Sensation: NT              []?Dermatomes/Myotomes intact               []?Reflexes equal and normal bilaterally              []?Other:     Joint mobility:                [x]? Normal: good patellar mobility                      []?Hypo              []?Hyper     Palpation: Mild TTP at MJL; no TTP at pes anserine infrapatellar fat pads,  LJL tenderness, IT Band and gerdy's tubercle tenderness 10/7/21     Functional Mobility/Transfers: ind 10/7/21     Posture: mild varus     Bandages/Dressings/Incisions: n/a     Gait: (include devices/WB status) slow, deliberate, but without AD 10/7/21    Orthopedic Special Tests: see above. Functional testing deferred.        RESTRICTIONS/PRECAUTIONS: ACL Tear / Medial meniscus tear / tibial plateau fracture Right knee; also on eliquis for blood clot  Exercises/Interventions:  Exercise/Equipment Resistance/Repetitions Other comments   Stretching       Hamstring 3x30\"    Hip Flexor     ITB     Figure 4     Quad     Inclined Calf 3x30\"       Self STM 5' stick; to hamstrings            ROM       EOB Self-Assist        Wall Slide     Manual     Biodex Passive     Recumbent Bike 5' Level 5 Seat 7      Hang Weights                Patellar Glides       Medial       Superior       Inferior               Isometrics           Add sets              SLR  HEP     Adducton     SLR+               Glutes       Bridges     Supine Clams     Side Stepping Monster walks        NPV        CKC       Weight Shift     NPV    Squatting 3x10 +10\" Holds last rep 10# Kettlebell goblet squat   CC TKE 2x10x5\" holds 6pl               PRE       Extension 3x10; 20# DL RANGE: 90-40   Leg Press 3x10 + 5 extra DL; 110# RANGE: 80-10   Cable Column               Balance       NPVSLS 3x10 chest presses + OH press with 2# med ball Airex               Other       Treadmill            Manual Interventions          Patient have access to gym? [] Yes  [] No  Patient have equipment at home? [] Yes  [] No       Patient Education:   7/15/21: Patient educated about PT diagnosis, prognosis, and plan of care; educated on role of physical therapy; educated on HEP; educated on anatomy of knee joint; discussed role of PT for ACL tear rehab; discussed rest/ice/elevation for swelling and symptom management; discussed using crutch(es) for ambulation until quad strength improves and she can walk without a limp. HEP:  Patient instructed on HEP on this date with handout provided and all questions answered. Discussions about how to progress sets/reps/resistance as necessary for fatigue and challenge. Patient was instructed to contact PT with any questions or concerns about HEP moving forward. Patient verbally stated she/he understood. Access Code: [email or web address]  URL: [email or web address]  Date: 09/13/2021  Prepared by:  Abram Foss    Exercises  Sitting Heel Slide with Towel - 3 x daily - 7 x weekly - 10 reps - 10 hold  Standing Gastroc Stretch on Step - 2 x daily - 7 x weekly - 3 reps - 30\" hold  Seated Table Hamstring Stretch - 2 x daily - 7 x weekly - 5 reps - 30\" hold  Long Sitting Quad Set - 10 x daily - 7 x weekly - 10 sets - 10\" hold  Active Straight Leg Raise with Quad Set - 1 x daily - 7 x weekly - 3 sets - 10 reps  Sidelying Hip Abduction - 1 x daily - 7 x weekly - 3 sets - 10 reps  Clamshell - 1 x daily - 7 x weekly - 3 sets - 10 reps  Standing Heel Raise - 1 x daily - 7 x weekly - 3 Manual Treatments:  PROM / STM / Oscillations-Mobs:  G-I, II, III, IV (PA's, Inf., Post.)  [x] (50011) Provided manual therapy to mobilize LE, proximal hip and/or LS spine soft tissue/joints for the purpose of modulating pain, promoting relaxation,  increasing ROM, reducing/eliminating soft tissue swelling/inflammation/restriction, improving soft tissue extensibility and allowing for proper ROM for normal function with self care, mobility, lifting and ambulation. Modalities: Declined    Charges:  Timed Code Treatment Minutes: 40   Total Treatment Minutes: 87   250-197    [] EVAL (LOW) 76957 (typically 20 minutes face-to-face)  [] EVAL (MOD) 90910 (typically 30 minutes face-to-face)  [] EVAL (HIGH) 10464 (typically 45 minutes face-to-face)  [] RE-EVAL   [x] BS(48607) x  1  [] IONTO  [x] NMR (93493) x     [] VASO  [] Manual (28392) x      [] Other:  [x] TA x   1   [] Mech Traction (18731)  [] ES(attended) (98815)      [] ES (un) (35642):     GOALS:   Patient stated goal: Return to full work duties    [x] Progressing: [] Met: [] Not Met: [] Adjusted    Therapist goals for Patient:   Short Term Goals: To be achieved in: 2 weeks  1. Independent in HEP and progression per patient tolerance, in order to prevent re-injury. [] Progressing: [x] Met: [] Not Met: [] Adjusted   2. Patient will have a decrease in pain to facilitate improvement in movement, function, and ADLs as indicated by Functional Deficits. [] Progressing: [x] Met: [] Not Met: [] Adjusted    Long Term Goals: To be achieved in: 12 weeks  1. Disability index score of 39.5% or less for the [facility] to assist with reaching prior level of function. [x] Progressing: [] Met: [] Not Met: [] Adjusted  2. Patient will demonstrate increased AROM to 0-120 to allow for proper joint functioning as indicated by patients Functional Deficits. [] Progressing: [x] Met: [] Not Met: [] Adjusted  3.  Patient will demonstrate an increase in Strength to 4+/5 in BLE Continue per plan of care [] Alter current plan (see comments above)  [] Plan of care initiated [] Hold pending MD visit [] Discharge      Electronically signed by:  Yuki Craft, PT, DPT, OCS    Note: If patient does not return for scheduled/ recommended follow up visits, this note will serve as a discharge from care along with most recent update on progress.

## 2021-10-25 NOTE — TELEPHONE ENCOUNTER
Spoke with patient today. Advised that brace was ordered and are awaiting its arrival in shipment. Discussed that it should be coming in soon, but that we are seeing delays in  [employer] shipments coming out of [place]. Advised that once I get brace in my possession I'll call patient to schedule a brace fitting, lasting about 15-20 minutes. Discussed that we can arrange this around PT appt. Or a day/time that is convenient for patient.

## 2021-10-26 ENCOUNTER — TELEPHONE (OUTPATIENT)
Dept: ORTHOPEDIC SURGERY | Age: 41
End: 2021-10-26

## 2021-10-26 NOTE — TELEPHONE ENCOUNTER
Left message for Lorna Joseph that patient is getting her knee brace on Thursday from [address] [facility].

## 2021-10-26 NOTE — TELEPHONE ENCOUNTER
Called patient to let her know knee brace arrived in shipment today. Patient will be fit on Thursday 10/28 at 2pm at [facility].

## 2021-10-26 NOTE — TELEPHONE ENCOUNTER
Medical Facility Question     Facility Name: [facility] Name: [address] [place] [address] Number: [phone]  Request or Information: PATIENT NEEDS A PRESCRIPTION FOR [employer]OOTS FULL FORCE FUNCTIONAL KNEE BRACE -  - NEEDS TO FAXED TO:    [address] [facility] [phone]

## 2021-10-28 DIAGNOSIS — S83.511A CHRONIC RUPTURE OF ACL OF RIGHT KNEE: ICD-10-CM

## 2021-10-28 DIAGNOSIS — M25.561 ACUTE PAIN OF RIGHT KNEE: ICD-10-CM

## 2021-10-28 PROCEDURE — L1845 KO DOUBLE UPRIGHT PRE CST: HCPCS | Performed by: ORTHOPAEDIC SURGERY

## 2021-11-01 ENCOUNTER — HOSPITAL ENCOUNTER (OUTPATIENT)
Dept: PHYSICAL THERAPY | Age: 41
Setting detail: THERAPIES SERIES
Discharge: HOME | End: 2021-11-01
Payer: COMMERCIAL

## 2021-11-01 NOTE — FLOWSHEET NOTE
[facility] [address] [place] [address]    Physical Therapy  Cancellation/No-show Note  Patient Name:  Marysol Herr  :  1980   Date:  2021  Cancelled visits to date: 4  No-shows to date: 1    For today's appointment patient:  [x]  Cancelled  []  Rescheduled appointment  []  No-show     Reason given by patient:  []  Patient ill  []  Conflicting appointment   []  No transportation    []  Conflict with work  []  No reason given  [x]  Other: Son is sick    Comments:      Electronically signed by:  Andrew Vu, PT, DPT, OCS

## 2021-11-04 ENCOUNTER — HOSPITAL ENCOUNTER (OUTPATIENT)
Dept: PHYSICAL THERAPY | Age: 41
Setting detail: THERAPIES SERIES
Discharge: HOME | End: 2021-11-04
Payer: COMMERCIAL

## 2021-11-04 NOTE — FLOWSHEET NOTE
[facility] [address] [place] [address]    Physical Therapy  Cancellation/No-show Note  Patient Name:  Marysol Herr  :  1980   Date:  2021  Cancelled visits to date: 5  No-shows to date: 1    For today's appointment patient:  [x]  Cancelled  []  Rescheduled appointment  []  No-show     Reason given by patient:  [x]  Patient ill  []  Conflicting appointment   []  No transportation    []  Conflict with work  []  No reason given  []  Other:    Comments:      Electronically signed by:  Andrew Vu, PT, DPT, OCS

## 2021-11-07 ENCOUNTER — APPOINTMENT (OUTPATIENT)
Dept: GENERAL RADIOLOGY | Age: 41
End: 2021-11-07
Payer: COMMERCIAL

## 2021-11-07 PROCEDURE — 99283 EMERGENCY DEPT VISIT LOW MDM: CPT

## 2021-11-07 PROCEDURE — 72040 X-RAY EXAM NECK SPINE 2-3 VW: CPT

## 2021-11-07 ASSESSMENT — PAIN SCALES - GENERAL: PAINLEVEL_OUTOF10: 10

## 2021-11-08 ENCOUNTER — HOSPITAL ENCOUNTER (EMERGENCY)
Age: 41
Discharge: HOME | End: 2021-11-08
Attending: EMERGENCY MEDICINE
Payer: COMMERCIAL

## 2021-11-08 ENCOUNTER — HOSPITAL ENCOUNTER (OUTPATIENT)
Dept: PHYSICAL THERAPY | Age: 41
Setting detail: THERAPIES SERIES
Discharge: HOME | End: 2021-11-08
Payer: COMMERCIAL

## 2021-11-08 VITALS
DIASTOLIC BLOOD PRESSURE: 94 MMHG | BODY MASS INDEX: 36.11 KG/M2 | TEMPERATURE: 97.9 F | OXYGEN SATURATION: 100 % | WEIGHT: 217 LBS | HEART RATE: 63 BPM | RESPIRATION RATE: 14 BRPM | SYSTOLIC BLOOD PRESSURE: 139 MMHG

## 2021-11-08 DIAGNOSIS — M62.838 TRAPEZIUS MUSCLE SPASM: ICD-10-CM

## 2021-11-08 DIAGNOSIS — M54.2 NECK PAIN: Primary | ICD-10-CM

## 2021-11-08 RX ORDER — NAPROXEN 500 MG/1
500 TABLET ORAL 2 TIMES DAILY
Qty: 20 TABLET | Refills: 0 | Status: SHIPPED | OUTPATIENT
Start: 2021-11-08 | End: 2022-06-16

## 2021-11-08 RX ORDER — CYCLOBENZAPRINE HCL 10 MG
10 TABLET ORAL NIGHTLY PRN
Qty: 10 TABLET | Refills: 0 | Status: SHIPPED | OUTPATIENT
Start: 2021-11-08 | End: 2021-11-18

## 2021-11-08 NOTE — ED PROVIDER NOTES
eMERGENCY dEPARTMENT eNCOUnter      Pt Name: Kelley Escobar  MRN: [id number]  [place] 1980  Date of evaluation: 11/7/2021  Provider: Jerald Perez MD     [address]       Chief Complaint   Patient presents with    Neck Pain     Began around 3, no known injury         HISTORY OF PRESENT ILLNESS   (Location/Symptom, Timing/Onset,Context/Setting, Quality, Duration, Modifying Factors, Severity) Note limiting factors. HPI    Kelley Escobar is a 42 y.o. female who presents to the emergency department with neck pain around 3:00. Known injury. Patient states movement makes it worse. Patient denies any numbness or tingling sensation. Patient denies any chest pain. There has been no sore throat. She has no headache. The pain is mostly in the trapezius area. Patient states movement makes it worse. Nursing Notes were reviewed. REVIEW OFSYSTEMS    (2+ for level 4; 10+ for level 5)   Review of Systems    General: No fevers, chills or night sweats, No weight loss    Head:  No Sore throat,  No Ear Pain    Chest:  Nontender. No Cough, No SOB,  Chest Pain    GI: No abdominal pain or vomiting    : No dysuria or hematuria    Musculoskeletal: No unrelenting pain or night pain    Neurologic: No bowel or bladder incontinence, No saddle anesthesia, No leg weakness    All other systems reviewed and are negative.         PAST MEDICAL HISTORY     Past Medical History:   Diagnosis Date    Cerebral artery occlusion with cerebral infarction ([facility] [address] [facility]     Hypertension     MI, old     Pilonidal cyst     Sickle cell trait ([facility] [address] [facility]     Vertigo        SURGICAL HISTORY       Past Surgical History:   Procedure Laterality Date    PILONIDAL CYST EXCISION         CURRENT MEDICATIONS       Discharge Medication List as of 11/8/2021  1:17 AM      CONTINUE these medications which have NOT CHANGED    Details   AYR 0.65 % nasal spray DAWHistorical Med      !! topiramate (TOPAMAX) 50 MG tablet TAKE 1.5 TABLETS BY MOUTH 2 (TWO) TIMES DAILY. Historical Med      SUMAtriptan (IMITREX) 100 MG tablet Take 50 mg by mouthHistorical Med      !! topiramate (TOPAMAX) 25 MG tablet Take 25 mg by mouth 2 times dailyHistorical Med      levETIRAcetam (KEPPRA) 1000 MG tablet Take 1,000 mg by mouth 2 times dailyHistorical Med       !! - Potential duplicate medications found. Please discuss with provider. ALLERGIES     Other    FAMILY HISTORY     History reviewed. No pertinent family history. SOCIAL HISTORY       Social History     Socioeconomic History    Marital status: Single     Spouse name: None    Number of children: None    Years of education: None    Highest education level: None   Occupational History    None   Tobacco Use    Smoking status: Current Every Day Smoker     Packs/day: 1.00     Types: Cigars    Smokeless tobacco: Never Used   Vaping Use    Vaping Use: Never used   Substance and Sexual Activity    Alcohol use: No     Comment: occ    Drug use: Yes     Types: Marijuana Vicki Swartz)     Comment: occ    Sexual activity: Not Currently     Comment: occasional   Other Topics Concern    None   Social History Narrative    ** Merged History Encounter **         ** Merged History Encounter **          Social Determinants of Health     Financial Resource Strain:     Difficulty of Paying Living Expenses: Not on file   Food Insecurity:     Worried About Running Out of Food in the Last Year: Not on file    Angelita of Food in the Last Year: Not on file   Transportation Needs:     Lack of Transportation (Medical): Not on file    Lack of Transportation (Non-Medical):  Not on file   Physical Activity:     Days of Exercise per Week: Not on file    Minutes of Exercise per Session: Not on file   Stress:     Feeling of Stress : Not on file   Social Connections:     Frequency of Communication with Friends and Family: Not on file    Frequency of Social Gatherings with Friends and Family: Not on file    Attends Yarsanism Services: Not on file   [facility] Cr[employer] or Organizations: Not on file    Attends Club or Organization Meetings: Not on file    Marital Status: Not on file   Intimate Partner Violence:     Fear of Current or Ex-Partner: Not on file    Emotionally Abused: Not on file    Physically Abused: Not on file    Sexually Abused: Not on file   Housing Stability:     Unable to Pay for Housing in the Last Year: Not on file    Number of [place] in the Last Year: Not on file    Unstable Housing in the Last Year: Not on file       SCREENINGS           PHYSICAL EXAM    (up to 7 for level 4, 8 or more for level 5)     ED Triage Vitals [11/07/21 2124]   BP Temp Temp Source Pulse Resp SpO2 Height Weight   (!) 168/101 97.9 °F (36.6 °C) Oral 94 16 100 % -- 217 lb (98.4 kg)       Physical Exam    General: Alert and awake ×3. Nontoxic appearance. Well-developed well-nourished 50-year-old female no distress  HEENT: Normocephalic atraumatic. Neck is supple. Airway intact. No adenopathy. Tenderness to palpation of trapezius reproducible. No midline tenderness. Turning her head makes it worse. Cardiac: Regular rate and rhythm with no murmurs rubs or gallops  Pulmonary: Lungs are clear in all lung fields. No wheezing. No Rales. Abdomen: Soft and nontender. Negative hepatosplenomegaly. Bowel sounds are active  Extremities: Moving all extremities. No calf tenderness. Peripheral pulses all intact  Skin: No skin lesions. No rashes  Neurologic: Cranial nerves II through XII was grossly intact. Nonfocal neurological exam  Psychiatric: Patient is pleasant. Mood is appropriate. DIAGNOSTIC RESULTS     EKG (Per Emergency Physician):       RADIOLOGY (Per Emergency Physician): Interpretation per the Radiologist below, if available at the time of this note:  No results found.     ED BEDSIDE ULTRASOUND:   Performed by ED Physician - none    LABS:  Labs Reviewed - No data to display     All other labs were within normal range or not returned as of this dictation. Procedures      EMERGENCY DEPARTMENT COURSE and DIFFERENTIAL DIAGNOSIS/MDM:   Vitals:    Vitals:    11/07/21 2124 11/08/21 0117   BP: (!) 168/101 (!) 139/94   Pulse: 94 63   Resp: 16 14   Temp: 97.9 °F (36.6 °C)    TempSrc: Oral    SpO2: 100% 100%   Weight: 217 lb (98.4 kg)        Medications - No data to display    MDM. Patient is a healthy 50-year-old with reproducible neck pain. Is is mostly tender at the trapezius. No focal weakness. Exam is reassuring. Patient placed on muscle relaxers. Patient will follow up. REVAL:         CRITICAL CARE TIME   Total CriticalCare time was 0 minutes, excluding separately reportable procedures. There was a high probability of clinically significant/life threatening deterioration in the patient's condition which required my urgent intervention. CONSULTS:  None    PROCEDURES:  Unless otherwise noted below, none     [unfilled]    FINAL IMPRESSION      1. Neck pain    2. Trapezius muscle spasm          DISPOSITION/PLAN   DISPOSITION Decision To Discharge 11/08/2021 01:00:32 AM      PATIENT REFERRED TO:  Marko Ojeda 54  [address]  [phone]    Schedule an appointment as soon as possible for a visit in 1 week  As needed, If symptoms worsen      DISCHARGE MEDICATIONS:  Discharge Medication List as of 11/8/2021  1:17 AM      START taking these medications    Details   cyclobenzaprine (FLEXERIL) 10 MG tablet Take 1 tablet by mouth nightly as needed for Muscle spasms, Disp-10 tablet, R-0Print      naproxen (NAPROSYN) 500 MG tablet Take 1 tablet by mouth 2 times daily for 20 doses, Disp-20 tablet, R-0Print                (Please note:  Portions of this note were completed with a voice recognition program.Efforts were made to edit the dictations but occasionally words and phrases are mis-transcribed.)  Form v2016. J.5-cn    OZIEL MEDLEY MD (electronically signed)  Emergency Medicine Provider        Israel Crum MD  11/11/21 2030

## 2021-11-08 NOTE — Clinical Note
Madie Yeung was seen and treated in our emergency department on 11/7/2021. She may return to work on 11/09/2021. If you have any questions or concerns, please don't hesitate to call.       Edelmira Martines MD

## 2021-11-08 NOTE — FLOWSHEET NOTE
[address]    Physical Therapy  Cancellation/No-show Note  Patient Name:  Chelly Babb  :  1980   Date:  2021  Cancelled visits to date: 10  No-shows to date: 1    For today's appointment patient:  [x]  Cancelled  []  Rescheduled appointment  []  No-show     Reason given by patient:  [x]  Patient ill  []  Conflicting appointment   []  No transportation    []  Conflict with work  []  No reason given  []  Other:    Comments:      Electronically signed by:  Jody Amador, PT, DPT, OCS

## 2021-11-11 ENCOUNTER — HOSPITAL ENCOUNTER (OUTPATIENT)
Dept: PHYSICAL THERAPY | Age: 41
Setting detail: THERAPIES SERIES
Discharge: HOME | End: 2021-11-11
Payer: COMMERCIAL

## 2021-11-11 ENCOUNTER — OFFICE VISIT (OUTPATIENT)
Dept: ORTHOPEDIC SURGERY | Age: 41
End: 2021-11-11
Payer: COMMERCIAL

## 2021-11-11 VITALS — WEIGHT: 217 LBS | HEIGHT: 65 IN | BODY MASS INDEX: 36.15 KG/M2

## 2021-11-11 DIAGNOSIS — S83.511A CHRONIC RUPTURE OF ACL OF RIGHT KNEE: Primary | ICD-10-CM

## 2021-11-11 PROCEDURE — 99212 OFFICE O/P EST SF 10 MIN: CPT | Performed by: ORTHOPAEDIC SURGERY

## 2021-11-11 PROCEDURE — G8427 DOCREV CUR MEDS BY ELIG CLIN: HCPCS | Performed by: ORTHOPAEDIC SURGERY

## 2021-11-11 PROCEDURE — G8484 FLU IMMUNIZE NO ADMIN: HCPCS | Performed by: ORTHOPAEDIC SURGERY

## 2021-11-11 PROCEDURE — 4004F PT TOBACCO SCREEN RCVD TLK: CPT | Performed by: ORTHOPAEDIC SURGERY

## 2021-11-11 PROCEDURE — 97530 THERAPEUTIC ACTIVITIES: CPT

## 2021-11-11 PROCEDURE — G8417 CALC BMI ABV UP PARAM F/U: HCPCS | Performed by: ORTHOPAEDIC SURGERY

## 2021-11-11 PROCEDURE — 97110 THERAPEUTIC EXERCISES: CPT

## 2021-11-11 RX ORDER — AMOXICILLIN AND CLAVULANATE POTASSIUM 875; 125 MG/1; MG/1
TABLET, FILM COATED ORAL
COMMUNITY
Start: 2021-10-27 | End: 2022-06-16

## 2021-11-11 NOTE — LETTER
[facility] [address] [place] [address] Nan Tabares [address]  Phone: [phone]  Fax: [phone]    Verner Harts, MD        November 11, 2021     Patient: Marionette Schlatter   YOB: 1980   Date of Visit: 11/11/2021       To Whom It May Concern: It is my medical opinion that Marionette Schlatter may return to work on 11/12/2021 with the following restrictions: Must wear knee brace. If you have any questions or concerns, please don't hesitate to call.     Sincerely,          Verner Harts, MD

## 2021-11-11 NOTE — PROGRESS NOTES
Nikolay Dominguez" is today to follow-up her chronic right ACL injury. I spoke with her therapist to confirm she is doing well no longer having significant pain or episodes of instability. She has been fit with a functional brace that is giving her some trouble in terms of fit. She feels like she is ready to resume full duty work. General Exam:    Vitals: Height 5' 5\" (1.651 m), weight 217 lb (98.4 kg), not currently breastfeeding. Constitutional: Patient is adequately groomed with no evidence of malnutrition  Mental Status: The patient is oriented to time, place and person. The patient's mood and affect are appropriate. Right knee today has no effusion. She has full motion. She has increased anterior translation but it is less bothersome and she has no joint line tenderness. Her brace fit was reviewed by the athletic training staff in the office today and she is comfortable with it. I am going to allow her to resume full duty work wearing her brace. Follow-up with me on an as-needed basis.

## 2021-11-11 NOTE — PLAN OF CARE
[facility] [address] [place] [address]     Physical Therapy Re-Certification Plan of Care    Dear  Dr. Roselyn Hoffman,    We had the pleasure of treating the following patient for physical therapy services at [address] [place] [address]. A summary of our findings can be found in the updated assessment below. This includes our plan of care. If you have any questions or concerns regarding these findings, please do not hesitate to contact me at [phone]. Thank you for the referral.     Physician Signature:________________________________Date:__________________  By signing above (or electronic signature), therapists plan is approved by physician      Overall Response to Treatment:   []Patient is responding well to treatment and improvement is noted with regards  to goals   []Patient should continue to improve in reasonable time if they continue HEP   []Patient has plateaued and is no longer responding to skilled PT intervention    []Patient is getting worse and would benefit from return to referring MD   []Patient unable to adhere to initial POC   []Other: Patient has responded well to PT for conservative treatment of torn ACL. She has made great improvement in strength and ROM and has eliminated any pain in her knee as well as instability. She has returned to work on seated duties and is ready to move towards standing duties with brace. She has missed last two weeks of PT due to being sick and still today had lower energy and stamina. Discussed possibility of transitioning to gym program in next 1-2 months - she is agreeable. Recommend patient continue PT1-2x/week for additional 6 weeks to continue to restore full BLE strength, endurance, and neuromuscular control of RLE in order to safely return patient to her PLOF.      Date range of Visits: 7/15/21-11/11/21  Total Visits: 15      Physical Therapy Daily Treatment Note  Date:  11/11/2021    Patient Name:  Elian Mariscal \"Kim\"  :  1980  MRN: [id number]  Restrictions/Precautions:    Medical/Treatment Diagnosis Information:  · Diagnosis: M25.561 (ICD-10-CM) - Acute pain of right knee  · Treatment Diagnosis: M25.561 R Knee Pain; M25.361 R Knee Instability; M62.559 Atrophy of unspecified thigh; Insurance/Certification information:  PT Insurance Information: [employer]; 30 PT visits; auth required  Physician Information:  Referring Practitioner: Dr. Verónica Valencia  Has the plan of care been signed (Y/N):        [x]  Yes  []  No     Date of Patient follow up with Physician: 21      Is this a Progress Report:     []  Yes  [x]  No        If Yes:  Date Range for reporting period:  Beginnin/15/21  Endin21    Progress report will be due (10 Rx or 30 days whichever is less):       Recertification will be due (POC Duration  / 90 days whichever is less): 21        Visit # Insurance Allowable Auth Required   3 of 20      12 previous 20 PT approved 10/20-      30 VPCY [x]  Yes []  No      OUTCOME MEASURE DATE DEFICIT   WOMAC 7/15/21 79% Deficit   WOMAC 21 58% Deficit   WOMAC 10/7/21 50% Deficit   WOMAC 21 52% Deficit            Latex Allergy:  [x]NO      []YES  Preferred Language for Healthcare:   [x]English       []other:    Pain level: 0/10 at rest,     SUBJECTIVE:  Doing well. No pain in knee at rest or activity. Has been feeling sick over past two weeks and missed PT - trying to keep up with HEP. Denies instability with day to day walking.          OBJECTIVE:        7/15/21 deferred  Flexibility L R Comment   Hamstring         Gastroc         ITB         Quad                      21  ROM PROM AROM Overpressure Comment     L R L R L R     Flexion    140  140       Extension     +3 +3                                                21  Strength L R Comment   Quad 5 4+    Hamstring 5 5     Gastroc         Hip  flexion        Hip abd 5 5     Hip Ext 4+ 4+     Hip IR         Hip ER          10/7/21  Special Test Results/Comment   Patellar Apprehension NT   Cavazos's Grind Test NT   Tejas's Unable to test due to lack of ROM   Thedunia's NT   Joint Line Tenderness Neg   Valgus Laxity (-)   Varus Laxity (-)   Lachmans (+) on R   Drop Back NT   BELA NT   FADDIR NT   Scour NT          Girth L R   Mid Patella 46 46   Suprapatellar 50 48.5   5cm above 58 52.0   15cm above 68 65.5      Reflexes/Sensation: NT              []?Dermatomes/Myotomes intact               []?Reflexes equal and normal bilaterally              []?Other:     Joint mobility:                [x]? Normal: good patellar mobility                      []?Hypo              []?Hyper     Palpation: no TTP 11/11/21     Functional Mobility/Transfers: ind 10/7/21     Posture: mild varus     Bandages/Dressings/Incisions: n/a     Gait: (include devices/WB status) WNL 11/11/21    Orthopedic Special Tests: see above. Functional testing deferred.        RESTRICTIONS/PRECAUTIONS: ACL Tear / Medial meniscus tear / tibial plateau fracture Right knee; also on eliquis for blood clot  Exercises/Interventions:  Exercise/Equipment Resistance/Repetitions Other comments   Stretching       Hamstring 3x30\"    Hip Flexor     ITB     Figure 4     Quad     Inclined Calf 3x30\"                  ROM       EOB Self-Assist        Wall Slide     Manual     Biodex Passive     Recumbent Bike 5' Level 5 Seat 7      Hang Weights                Patellar Glides       Medial       Superior       Inferior               Isometrics           Add sets              SLR      Supine 3x10 2#   Prone 3x10 off EOB 2#   Abduction 3x10 2#   Adducton     SLR+               Glutes       Bridges     Supine Clams     S/L Clams 3x10 Both Green   Side Stepping       Monster walks        NPV        CKC       Weight Shift     NPV    Squatting 3x10 +10\" Holds last rep 10# Kettlebell goblet squat   CC TKE 2x10x5\" holds 6pl               PRE       Cable Column               Balance Other       Treadmill            Manual Interventions          Patient have access to gym? [] Yes  [] No  Patient have equipment at home? [] Yes  [] No       Patient Education:   7/15/21: Patient educated about PT diagnosis, prognosis, and plan of care; educated on role of physical therapy; educated on HEP; educated on anatomy of knee joint; discussed role of PT for ACL tear rehab; discussed rest/ice/elevation for swelling and symptom management; discussed using crutch(es) for ambulation until quad strength improves and she can walk without a limp. HEP:  Patient instructed on HEP on this date with handout provided and all questions answered. Discussions about how to progress sets/reps/resistance as necessary for fatigue and challenge. Patient was instructed to contact PT with any questions or concerns about HEP moving forward. Patient verbally stated she/he understood. Access Code: [email or web address]  URL: [email or web address]  Date: 09/13/2021  Prepared by:  [facility]    Exercises  Sitting Heel Slide with Towel - 3 x daily - 7 x weekly - 10 reps - 10 hold  Standing Gastroc Stretch on Step - 2 x daily - 7 x weekly - 3 reps - 30\" hold  Seated Table Hamstring Stretch - 2 x daily - 7 x weekly - 5 reps - 30\" hold  Long Sitting Quad Set - 10 x daily - 7 x weekly - 10 sets - 10\" hold  Active Straight Leg Raise with Quad Set - 1 x daily - 7 x weekly - 3 sets - 10 reps  Sidelying Hip Abduction - 1 x daily - 7 x weekly - 3 sets - 10 reps  Clamshell - 1 x daily - 7 x weekly - 3 sets - 10 reps  Standing Heel Raise - 1 x daily - 7 x weekly - 3 sets - 10 reps  Standing Terminal Knee Extension with Resistance - 1 x daily - 7 x weekly - 3 sets - 10 reps - 5 hold  Wall Quarter Squat - 1 x daily - 7 x weekly - 15\" hold  Single Leg Stance - 1 x daily - 7 x weekly - 3 sets - 20-30 hold  Step Up - 1 x daily - 7 x weekly - 3 sets - 10 reps  Seated Long Arc Quad (90-45 Degree Range) - 1 x daily - 7 x weekly - 3 sets - 10 reps      Therapeutic Exercise and NMR EXR  [x] (84395) Provided verbal/tactile cueing for activities related to strengthening, flexibility, endurance, ROM for improvements in LE, proximal hip, and core control with self care, mobility, lifting, ambulation. [x] (65027) Provided verbal/tactile cueing for activities related to improving balance, coordination, kinesthetic sense, posture, motor skill, proprioception  to assist with LE, proximal hip, and core control in self care, mobility, lifting, ambulation and eccentric single leg control.      NMR and Therapeutic Activities:    [x] (90016 or 77398) Provided verbal/tactile cueing for activities related to improving balance, coordination, kinesthetic sense, posture, motor skill, proprioception and motor activation to allow for proper function of core, proximal hip and LE with self care and ADLs  [] (57785) Gait Re-education- Provided training and instruction to the patient for proper LE, core and proximal hip recruitment and positioning and eccentric body weight control with ambulation re-education including up and down stairs     Home Exercise Program:    [x] (44748) Reviewed/Progressed HEP activities related to strengthening, flexibility, endurance, ROM of core, proximal hip and LE for functional self-care, mobility, lifting and ambulation/stair navigation   [] (32254)Reviewed/Progressed HEP activities related to improving balance, coordination, kinesthetic sense, posture, motor skill, proprioception of core, proximal hip and LE for self care, mobility, lifting, and ambulation/stair navigation      Manual Treatments:  PROM / STM / Oscillations-Mobs:  G-I, II, III, IV (PA's, Inf., Post.)  [x] (70389) Provided manual therapy to mobilize LE, proximal hip and/or LS spine soft tissue/joints for the purpose of modulating pain, promoting relaxation,  increasing ROM, reducing/eliminating soft tissue swelling/inflammation/restriction, improving soft tissue extensibility and giving away in knee. (patient specific functional goal)    [x] Progressing: [] Met: [] Not Met: [] Adjusted     Overall Progression Towards Functional goals/ Treatment Progress Update:  [x] Patient is progressing as expected towards functional goals listed. [] Progression is slowed due to complexities/Impairments listed. [] Progression has been slowed due to co-morbidities. [] Plan just implemented, too soon to assess goals progression <30days   [] Goals require adjustment due to lack of progress  [] Patient is not progressing as expected and requires additional follow up with physician  [] Other    Prognosis for POC: [x] Good [] Fair  [] Poor      Patient requires continued skilled intervention: [x] Yes  [] No    Treatment/Activity Tolerance:  [x] Patient able to complete treatment  [] Patient limited by fatigue  [] Patient limited by pain     [] Patient limited by other medical complications  [] Other:See above. PLAN: 1-2x. week for 6 more weeks 11/11/21  [x] Continue per plan of care [] Alter current plan (see comments above)  [] Plan of care initiated [] Hold pending MD visit [] Discharge      Electronically signed by:  Juvenal Tejeda, PT, DPT, OCS    Note: If patient does not return for scheduled/ recommended follow up visits, this note will serve as a discharge from care along with most recent update on progress.

## 2021-11-15 ENCOUNTER — HOSPITAL ENCOUNTER (OUTPATIENT)
Dept: PHYSICAL THERAPY | Age: 41
Setting detail: THERAPIES SERIES
Discharge: HOME | End: 2021-11-15
Payer: COMMERCIAL

## 2021-11-18 ENCOUNTER — HOSPITAL ENCOUNTER (OUTPATIENT)
Dept: PHYSICAL THERAPY | Age: 41
Setting detail: THERAPIES SERIES
Discharge: HOME | End: 2021-11-18
Payer: COMMERCIAL

## 2021-11-18 NOTE — FLOWSHEET NOTE
[facility] [address] [place] [address]    Physical Therapy  Cancellation/No-show Note  Patient Name:  Sarah Acosta  :  1980   Date:  2021  Cancelled visits to date: 7  No-shows to date: 2    For today's appointment patient:  [x]  Cancelled  []  Rescheduled appointment  []  No-show     Reason given by patient:  []  Patient ill  []  Conflicting appointment   []  No transportation    []  Conflict with work  [x]  No reason given  []  Other:      Comments:      Electronically signed by:  Joey Quan, PT, DPT, OCS

## 2021-11-29 ENCOUNTER — HOSPITAL ENCOUNTER (OUTPATIENT)
Dept: PHYSICAL THERAPY | Age: 41
Setting detail: THERAPIES SERIES
Discharge: HOME | End: 2021-11-29
Payer: COMMERCIAL

## 2021-11-29 PROCEDURE — 97530 THERAPEUTIC ACTIVITIES: CPT

## 2021-11-29 PROCEDURE — 97110 THERAPEUTIC EXERCISES: CPT

## 2021-11-29 NOTE — FLOWSHEET NOTE
[address] [facility], [place]          Physical Therapy Daily Treatment Note  Date:  2021    Patient Name:  Garrick Daigle"  :  1980  MRN: [id number]  Restrictions/Precautions:    Medical/Treatment Diagnosis Information:  · Diagnosis: M25.561 (ICD-10-CM) - Acute pain of right knee  · Treatment Diagnosis: M25.561 R Knee Pain; M25.361 R Knee Instability; M62.559 Atrophy of unspecified thigh; Insurance/Certification information:  PT Insurance Information: [facility]; 30 PT visits; auth required  Physician Information:  Referring Practitioner: Dr. Jamie Aaron  Has the plan of care been signed (Y/N):        []  Yes  [x]  No     Date of Patient follow up with Physician:  As needed      Is this a Progress Report:     []  Yes  [x]  No        If Yes:  Date Range for reporting period:  Beginnin/15/21  Ending:     Progress report will be due (10 Rx or 30 days whichever is less):       Recertification will be due (POC Duration  / 90 days whichever is less): 21        Visit # Insurance Allowable Auth Required    of       12 previous 20 PT approved 10/20-      30 VPCY [x]  Yes []  No      OUTCOME MEASURE DATE DEFICIT   WOMAC 7/15/21 79% Deficit   WOMAC 21 58% Deficit   WOMAC 10/7/21 50% Deficit   WOMAC 21 52% Deficit            Latex Allergy:  [x]NO      []YES  Preferred Language for Healthcare:   [x]English       []other:    Pain level: 0/10 at rest,     SUBJECTIVE:  Doing well. Has returned to full work duties including standing work with no limitations during her shifts. She does notice knee swelling at the end of her work shift but she ices/elevates before bed and it is resolved by the next day.  Reports compliance with HEP over the past two weeks despite not being in PT.       OBJECTIVE:        7/15/21 deferred  Flexibility L R Comment   Hamstring         Gastroc         ITB         Quad                      21  ROM PROM AROM Overpressure Comment     L R L R L R     Flexion    140  140       Extension     +3 +3                                                11/11/21  Strength L R Comment   Quad 5 4+    Hamstring 5 5     Gastroc         Hip  flexion        Hip abd 5 5     Hip Ext 4+ 4+     Hip IR         Hip ER            10/7/21  Special Test Results/Comment   Patellar Apprehension NT   Cavazos's Grind Test NT   Tejas's Unable to test due to lack of ROM   Thessaly's NT   Joint Line Tenderness Neg   Valgus Laxity (-)   Varus Laxity (-)   Lachmans (+) on R   Drop Back NT   BELA NT   FADDIR NT   Scour NT          Girth L R   Mid Patella 46 46   Suprapatellar 50 48.5   5cm above 58 52.0   15cm above 68 65.5      Reflexes/Sensation: NT              []?Dermatomes/Myotomes intact               []?Reflexes equal and normal bilaterally              []?Other:     Joint mobility:                [x]? Normal: good patellar mobility                      []?Hypo              []?Hyper     Palpation: no TTP 11/11/21     Functional Mobility/Transfers: ind 10/7/21     Posture: mild varus     Bandages/Dressings/Incisions: n/a     Gait: (include devices/WB status) WNL 11/11/21    Orthopedic Special Tests: see above. Functional testing deferred.        RESTRICTIONS/PRECAUTIONS: ACL Tear / Medial meniscus tear / tibial plateau fracture Right knee; also on eliquis for blood clot  Exercises/Interventions:  Exercise/Equipment Resistance/Repetitions Other comments   Stretching       Hamstring 3x30\"    Hip Flexor     ITB     Figure 4     Quad     Inclined Calf 3x30\"                  ROM       EOB Self-Assist        Wall Slide     Manual     Biodex Passive     Recumbent Bike 5' Level 5 Seat 7      Hang Weights                Patellar Glides       Medial       Superior       Inferior               Isometrics           Add sets              SLR      Supine 3x10 2#   Prone 3x10 off EOB 2#   Abduction 3x10 2#   Adducton     SLR+               Glutes       [employer] 10 reps  Standing Heel Raise - 1 x daily - 7 x weekly - 3 sets - 10 reps  Standing Terminal Knee Extension with Resistance - 1 x daily - 7 x weekly - 3 sets - 10 reps - 5 hold  Wall Quarter Squat - 1 x daily - 7 x weekly - 15\" hold  Single Leg Stance - 1 x daily - 7 x weekly - 3 sets - 20-30 hold  Step Up - 1 x daily - 7 x weekly - 3 sets - 10 reps  Seated Long Arc Quad (90-45 Degree Range) - 1 x daily - 7 x weekly - 3 sets - 10 reps      Therapeutic Exercise and NMR EXR  [x] (89118) Provided verbal/tactile cueing for activities related to strengthening, flexibility, endurance, ROM for improvements in LE, proximal hip, and core control with self care, mobility, lifting, ambulation. [x] (70074) Provided verbal/tactile cueing for activities related to improving balance, coordination, kinesthetic sense, posture, motor skill, proprioception  to assist with LE, proximal hip, and core control in self care, mobility, lifting, ambulation and eccentric single leg control.      NMR and Therapeutic Activities:    [x] (74720 or 27147) Provided verbal/tactile cueing for activities related to improving balance, coordination, kinesthetic sense, posture, motor skill, proprioception and motor activation to allow for proper function of core, proximal hip and LE with self care and ADLs  [] (37657) Gait Re-education- Provided training and instruction to the patient for proper LE, core and proximal hip recruitment and positioning and eccentric body weight control with ambulation re-education including up and down stairs     Home Exercise Program:    [x] (12960) Reviewed/Progressed HEP activities related to strengthening, flexibility, endurance, ROM of core, proximal hip and LE for functional self-care, mobility, lifting and ambulation/stair navigation   [] (33950)Reviewed/Progressed HEP activities related to improving balance, coordination, kinesthetic sense, posture, motor skill, proprioception of core, proximal hip and LE for self care, mobility, lifting, and ambulation/stair navigation      Manual Treatments:  PROM / STM / Oscillations-Mobs:  G-I, II, III, IV (PA's, Inf., Post.)  [x] (61706) Provided manual therapy to mobilize LE, proximal hip and/or LS spine soft tissue/joints for the purpose of modulating pain, promoting relaxation,  increasing ROM, reducing/eliminating soft tissue swelling/inflammation/restriction, improving soft tissue extensibility and allowing for proper ROM for normal function with self care, mobility, lifting and ambulation. Modalities: Declined    Charges:  Timed Code Treatment Minutes: 40   Total Treatment Minutes: 40   209-164    [] EVAL (LOW) 29445 (typically 20 minutes face-to-face)  [] EVAL (MOD) 11373 (typically 30 minutes face-to-face)  [] EVAL (HIGH) 62442 (typically 45 minutes face-to-face)  [] RE-EVAL   [x] CL(98310) x  2  [] IONTO  [] NMR (25144) x     [] VASO  [] Manual (03257) x      [] Other:  [x] TA x   1   [] Mech Traction (89511)  [] ES(attended) (76143)      [] ES (un) (00029):     GOALS:   Patient stated goal: Return to full work duties    [x] Progressing: [] Met: [] Not Met: [] Adjusted    Therapist goals for Patient:   Short Term Goals: To be achieved in: 2 weeks  1. Independent in HEP and progression per patient tolerance, in order to prevent re-injury. [] Progressing: [x] Met: [] Not Met: [] Adjusted   2. Patient will have a decrease in pain to facilitate improvement in movement, function, and ADLs as indicated by Functional Deficits. [] Progressing: [x] Met: [] Not Met: [] Adjusted    Long Term Goals: To be achieved in: 12 weeks  1. Disability index score of 39.5% or less for the [facility] to assist with reaching prior level of function. [x] Progressing: [] Met: [] Not Met: [] Adjusted  2. Patient will demonstrate increased AROM to 0-120 to allow for proper joint functioning as indicated by patients Functional Deficits. [] Progressing: [x] Met: [] Not Met: [] Adjusted  3.  Patient will demonstrate an increase in Strength to 4+/5 in BLE to allow for proper functional mobility as indicated by patients Functional Deficits. [] Progressing: [x] Met: [] Not Met: [] Adjusted  4. Patient will return to ADL and other functional activities without increased symptoms or restriction. [] Progressing: [x] Met: [] Not Met: [] Adjusted  5. Patient will ascend/descend a flight of stairs independently without pain or giving away in knee. (patient specific functional goal)    [x] Progressing: [] Met: [] Not Met: [] Adjusted     Overall Progression Towards Functional goals/ Treatment Progress Update:  [x] Patient is progressing as expected towards functional goals listed. [] Progression is slowed due to complexities/Impairments listed. [] Progression has been slowed due to co-morbidities. [] Plan just implemented, too soon to assess goals progression <30days   [] Goals require adjustment due to lack of progress  [] Patient is not progressing as expected and requires additional follow up with physician  [] Other    Prognosis for POC: [x] Good [] Fair  [] Poor      Patient requires continued skilled intervention: [x] Yes  [] No    Treatment/Activity Tolerance:  [x] Patient able to complete treatment  [] Patient limited by fatigue  [] Patient limited by pain     [] Patient limited by other medical complications  [] Other: a bit fatigued today coming straight from work. Despite fatigue,w as able to add back in machines without any increase in pain in knee. Reviewed importance of rest/ice/elevation after work shifts to help manage swelling so that she is fully recovered by the next day. She verbalized understanding. Will continue to progress back to full program as fatigue allows. Continue PT to improve patient's LE strength, endurance, and neuromuscular control of knee in order to decrease pain and instability in her knee. PLAN: 1-2x. week for 6 more weeks 11/11/21  [x] Continue per plan of care [] Alter current plan (see comments above)  [] Plan of care initiated [] Hold pending MD visit [] Discharge      Electronically signed by:  Chris Olguin, PT, DPT, OCS    Note: If patient does not return for scheduled/ recommended follow up visits, this note will serve as a discharge from care along with most recent update on progress.

## 2021-12-02 ENCOUNTER — APPOINTMENT (OUTPATIENT)
Dept: PHYSICAL THERAPY | Age: 41
End: 2021-12-02
Payer: COMMERCIAL

## 2021-12-06 ENCOUNTER — HOSPITAL ENCOUNTER (OUTPATIENT)
Dept: PHYSICAL THERAPY | Age: 41
Setting detail: THERAPIES SERIES
Discharge: HOME | End: 2021-12-06
Payer: COMMERCIAL

## 2021-12-06 NOTE — FLOWSHEET NOTE
[facility] [address] [place] [address]    Physical Therapy  Cancellation/No-show Note  Patient Name:  Jean-Paul Mcknight  :  1980   Date:  2021  Cancelled visits to date: 6  No-shows to date: 2    For today's appointment patient:  [x]  Cancelled  []  Rescheduled appointment  []  No-show     Reason given by patient:  [x]  Patient ill  []  Conflicting appointment   []  No transportation    []  Conflict with work  []  No reason given  [x]  Other: Patient called to cancel - stated she was at work and having a seizure. PT returned call to check on patient to be sure she was safe / with support and she said she was fine, sitting down with her work supervisor near by and just waiting on her epilepsy medication to kick in. Instructed patient to focus on slow breathing to help calm stress/nerves. Also asked that patient call PT clinic back within the hour to confirm she was feeling better and safely at home.      Comments:      Electronically signed by:  Antoinette Palacio, PT, DPT, OCS

## 2021-12-13 ENCOUNTER — HOSPITAL ENCOUNTER (OUTPATIENT)
Dept: PHYSICAL THERAPY | Age: 41
Setting detail: THERAPIES SERIES
Discharge: HOME | End: 2021-12-13
Payer: COMMERCIAL

## 2021-12-13 NOTE — FLOWSHEET NOTE
[address] [place], [place] [address], [place]    Physical Therapy  Cancellation/No-show Note  Patient Name:  Brian Gonzales  :  1980   Date:  2021  Cancelled visits to date: 5  No-shows to date: 2     For today's appointment patient:  [x]  Cancelled  []  Rescheduled appointment  []  No-show     Reason given by patient:  []  Patient ill  []  Conflicting appointment   []  No transportation    [x]  Conflict with work  []  No reason given  [x]  Other: Patient called and left voicemail stating she has a mandatory work meeting.       Comments:      Electronically signed by:  Barry Escalante DPT

## 2021-12-16 ENCOUNTER — HOSPITAL ENCOUNTER (OUTPATIENT)
Dept: PHYSICAL THERAPY | Age: 41
Setting detail: THERAPIES SERIES
Discharge: HOME | End: 2021-12-16
Payer: COMMERCIAL

## 2021-12-16 PROCEDURE — 97110 THERAPEUTIC EXERCISES: CPT

## 2021-12-16 NOTE — FLOWSHEET NOTE
[address] [facility], [place]          Physical Therapy Daily Treatment Note  Date:  2021    Patient Name:  Ronan Quevedo"  :  1980  MRN: [id number]  Restrictions/Precautions:    Medical/Treatment Diagnosis Information:  · Diagnosis: M25.561 (ICD-10-CM) - Acute pain of right knee  · Treatment Diagnosis: M25.561 R Knee Pain; M25.361 R Knee Instability; M62.559 Atrophy of unspecified thigh; Insurance/Certification information:  PT Insurance Information: [facility]; 30 PT visits; auth required  Physician Information:  Referring Practitioner: Dr. Horton Failing  Has the plan of care been signed (Y/N):        []  Yes  [x]  No     Date of Patient follow up with Physician:  As needed      Is this a Progress Report:     []  Yes  [x]  No        If Yes:  Date Range for reporting period:  Beginnin/15/21  Ending:     Progress report will be due (10 Rx or 30 days whichever is less):       Recertification will be due (POC Duration  / 90 days whichever is less): 21        Visit # Insurance Allowable Auth Required   5 of 20      12 previous 20 PT approved 10/20-      30 VPCY [x]  Yes []  No      OUTCOME MEASURE DATE DEFICIT   WOMAC 7/15/21 79% Deficit   WOMAC 21 58% Deficit   WOMAC 10/7/21 50% Deficit   WOMAC 21 52% Deficit            Latex Allergy:  [x]NO      []YES  Preferred Language for Healthcare:   [x]English       []other:    Pain level: 0/10 at rest,     SUBJECTIVE:  Doing well. Feeling less fatigue at work as she adjusts back to full duty. No longer gets swelling after her work  Shifts. No shifting, giving away, or instability in knee. No pain at rest today. Reports compliance with HEP 3x/week. Has not joined a gym yet but has been looking into a few options.         OBJECTIVE:        7/15/21 deferred  Flexibility L R Comment   Hamstring         Gastroc         ITB         Quad                      21  ROM PROM AROM Overpressure Comment     L R L R L R     Flexion    140  140       Extension     +3 +3                                                11/11/21  Strength L R Comment   Quad 5 4+    Hamstring 5 5     Gastroc         Hip  flexion        Hip abd 5 5     Hip Ext 4+ 4+     Hip IR         Hip ER            10/7/21  Special Test Results/Comment   Patellar Apprehension NT   Cavazos's Grind Test NT   Tejas's Unable to test due to lack of ROM   Thessaly's NT   Joint Line Tenderness Neg   Valgus Laxity (-)   Varus Laxity (-)   Lachmans (+) on R   Drop Back NT   BELA NT   FADDIR NT   Scour NT          Girth L R   Mid Patella 46 46   Suprapatellar 50 48.5   5cm above 58 52.0   15cm above 68 65.5      Reflexes/Sensation: NT              []?Dermatomes/Myotomes intact               []?Reflexes equal and normal bilaterally              []?Other:     Joint mobility:                [x]? Normal: good patellar mobility                      []?Hypo              []?Hyper     Palpation: no TTP 11/11/21     Functional Mobility/Transfers: ind 10/7/21     Posture: mild varus     Bandages/Dressings/Incisions: n/a     Gait: (include devices/WB status) WNL 11/11/21    Orthopedic Special Tests: see above. Functional testing deferred.        RESTRICTIONS/PRECAUTIONS: ACL Tear / Medial meniscus tear / tibial plateau fracture Right knee; also on eliquis for blood clot  Exercises/Interventions:  Exercise/Equipment Resistance/Repetitions Other comments   Stretching       Hamstring 3x30\"    Hip Flexor     ITB     Figure 4     Quad     Inclined Calf 3x30\"                  ROM       EOB Self-Assist        Wall Slide     Manual     [employer] Passive     Recumbent Bike 5' Level 5 Seat 7      Hang Weights                Patellar Glides       Medial       Superior       Inferior               Isometrics           Add sets              SLR      Supine 3x10 2#   Prone 3x10 off EOB 2#   Abduction 3x10 2#   Adducton     SLR+               Glutes       Bridges 2x10 Red   Supine Clams     S/L Clams 3x10 Both Red   Side Stepping       Monster walks                CKC       Weight Shift         Squatting 3x10 +10\" Holds last rep 15# Kettlebell goblet squat               PRE       Extension 3x10; 30# DL RANGE: 90-40   Flexion 3x10 ; 45# DL RANGE: Avail   Leg Press 3x10  DL; 120# RANGE: 80-10   Cable Column               Balance       SLS 3x20\" Airex               Other       Treadmill            Manual Interventions          Patient have access to gym? [] Yes  [] No  Patient have equipment at home? [] Yes  [] No       Patient Education:   7/15/21: Patient educated about PT diagnosis, prognosis, and plan of care; educated on role of physical therapy; educated on HEP; educated on anatomy of knee joint; discussed role of PT for ACL tear rehab; discussed rest/ice/elevation for swelling and symptom management; discussed using crutch(es) for ambulation until quad strength improves and she can walk without a limp. HEP:  Patient instructed on HEP on this date with handout provided and all questions answered. Discussions about how to progress sets/reps/resistance as necessary for fatigue and challenge. Patient was instructed to contact PT with any questions or concerns about HEP moving forward. Patient verbally stated she/he understood. Access Code: [email or web address]  URL: [email or web address]  Date: 09/13/2021  Prepared by:  Abram Foss    Exercises  Sitting Heel Slide with Towel - 3 x daily - 7 x weekly - 10 reps - 10 hold  Standing Gastroc Stretch on Step - 2 x daily - 7 x weekly - 3 reps - 30\" hold  Seated Table Hamstring Stretch - 2 x daily - 7 x weekly - 5 reps - 30\" hold  Long Sitting Quad Set - 10 x daily - 7 x weekly - 10 sets - 10\" hold  Active Straight Leg Raise with Quad Set - 1 x daily - 7 x weekly - 3 sets - 10 reps  Sidelying Hip Abduction - 1 x daily - 7 x weekly - 3 sets - 10 reps  Clamshell - 1 x daily - 7 x weekly - 3 sets - 10 reps  Standing Heel Raise - 1 x daily - 7 x weekly - 3 sets - 10 reps  Standing Terminal Knee Extension with Resistance - 1 x daily - 7 x weekly - 3 sets - 10 reps - 5 hold  Wall Quarter Squat - 1 x daily - 7 x weekly - 15\" hold  Single Leg Stance - 1 x daily - 7 x weekly - 3 sets - 20-30 hold  Step Up - 1 x daily - 7 x weekly - 3 sets - 10 reps  Seated Long Arc Quad (90-45 Degree Range) - 1 x daily - 7 x weekly - 3 sets - 10 reps      Therapeutic Exercise and NMR EXR  [x] (98828) Provided verbal/tactile cueing for activities related to strengthening, flexibility, endurance, ROM for improvements in LE, proximal hip, and core control with self care, mobility, lifting, ambulation. [x] (56598) Provided verbal/tactile cueing for activities related to improving balance, coordination, kinesthetic sense, posture, motor skill, proprioception  to assist with LE, proximal hip, and core control in self care, mobility, lifting, ambulation and eccentric single leg control.      NMR and Therapeutic Activities:    [x] (00276 or 86392) Provided verbal/tactile cueing for activities related to improving balance, coordination, kinesthetic sense, posture, motor skill, proprioception and motor activation to allow for proper function of core, proximal hip and LE with self care and ADLs  [] (62771) Gait Re-education- Provided training and instruction to the patient for proper LE, core and proximal hip recruitment and positioning and eccentric body weight control with ambulation re-education including up and down stairs     Home Exercise Program:    [x] (74388) Reviewed/Progressed HEP activities related to strengthening, flexibility, endurance, ROM of core, proximal hip and LE for functional self-care, mobility, lifting and ambulation/stair navigation   [] (51459)Reviewed/Progressed HEP activities related to improving balance, coordination, kinesthetic sense, posture, motor skill, proprioception of core, proximal hip and LE for self care, mobility, lifting, and ambulation/stair navigation Manual Treatments:  PROM / STM / Oscillations-Mobs:  G-I, II, III, IV (PA's, Inf., Post.)  [x] (59532) Provided manual therapy to mobilize LE, proximal hip and/or LS spine soft tissue/joints for the purpose of modulating pain, promoting relaxation,  increasing ROM, reducing/eliminating soft tissue swelling/inflammation/restriction, improving soft tissue extensibility and allowing for proper ROM for normal function with self care, mobility, lifting and ambulation. Modalities: Declined    Charges:  Timed Code Treatment Minutes: 30   Total Treatment Minutes: 46   080-040    [] EVAL (LOW) 14920 (typically 20 minutes face-to-face)  [] EVAL (MOD) 64064 (typically 30 minutes face-to-face)  [] EVAL (HIGH) 42635 (typically 45 minutes face-to-face)  [] RE-EVAL   [x] PY(94743) x  2  [] IONTO  [] NMR (51545) x     [] VASO  [] Manual (17744) x      [] Other:  [] TA x   1   [] Mech Traction (14560)  [] ES(attended) (60153)      [] ES (un) (61533):     GOALS:   Patient stated goal: Return to full work duties    [x] Progressing: [] Met: [] Not Met: [] Adjusted    Therapist goals for Patient:   Short Term Goals: To be achieved in: 2 weeks  1. Independent in HEP and progression per patient tolerance, in order to prevent re-injury. [] Progressing: [x] Met: [] Not Met: [] Adjusted   2. Patient will have a decrease in pain to facilitate improvement in movement, function, and ADLs as indicated by Functional Deficits. [] Progressing: [x] Met: [] Not Met: [] Adjusted    Long Term Goals: To be achieved in: 12 weeks  1. Disability index score of 39.5% or less for the [facility] to assist with reaching prior level of function. [x] Progressing: [] Met: [] Not Met: [] Adjusted  2. Patient will demonstrate increased AROM to 0-120 to allow for proper joint functioning as indicated by patients Functional Deficits. [] Progressing: [x] Met: [] Not Met: [] Adjusted  3.  Patient will demonstrate an increase in Strength to 4+/5 in BLE to allow for proper functional mobility as indicated by patients Functional Deficits. [] Progressing: [x] Met: [] Not Met: [] Adjusted  4. Patient will return to ADL and other functional activities without increased symptoms or restriction. [] Progressing: [x] Met: [] Not Met: [] Adjusted  5. Patient will ascend/descend a flight of stairs independently without pain or giving away in knee. (patient specific functional goal)    [x] Progressing: [] Met: [] Not Met: [] Adjusted     Overall Progression Towards Functional goals/ Treatment Progress Update:  [x] Patient is progressing as expected towards functional goals listed. [] Progression is slowed due to complexities/Impairments listed. [] Progression has been slowed due to co-morbidities. [] Plan just implemented, too soon to assess goals progression <30days   [] Goals require adjustment due to lack of progress  [] Patient is not progressing as expected and requires additional follow up with physician  [] Other    Prognosis for POC: [x] Good [] Fair  [] Poor      Patient requires continued skilled intervention: [x] Yes  [] No    Treatment/Activity Tolerance:  [x] Patient able to complete treatment  [] Patient limited by fatigue  [] Patient limited by pain     [] Patient limited by other medical complications  [] Other: Limited visit today due to arrive 15 minutes late. Increased weight son PRE machines showing improvement in BLE strength. Very challenged by bridge addition showing on-going hip/glute weakness. Overall progressing well with her HEP and has demonstrated good dynamic control of her knee. Plan on discharge next week to HEP / gym program. Continue PT to improve patient's LE strength, endurance, and neuromuscular control of knee in order to decrease pain and instability in her knee. PLAN: 1-2x. week for 6 more weeks 11/11/21  [x] Continue per plan of care [] Alter current plan (see comments above)  [] Plan of care initiated [] Hold pending MD visit [] Discharge      Electronically signed by:  Alejandro Age, PT, DPT, OCS    Note: If patient does not return for scheduled/ recommended follow up visits, this note will serve as a discharge from care along with most recent update on progress.

## 2021-12-20 ENCOUNTER — HOSPITAL ENCOUNTER (OUTPATIENT)
Dept: PHYSICAL THERAPY | Age: 41
Setting detail: THERAPIES SERIES
Discharge: HOME | End: 2021-12-20
Payer: COMMERCIAL

## 2021-12-20 PROCEDURE — 97530 THERAPEUTIC ACTIVITIES: CPT

## 2021-12-20 PROCEDURE — 97110 THERAPEUTIC EXERCISES: CPT

## 2021-12-20 NOTE — FLOWSHEET NOTE
[address] [facility], [place]          Physical Therapy Daily Treatment Note  Date:  2021    Patient Name:  Leslie Springer"  :  1980  MRN: [id number]  Restrictions/Precautions:    Medical/Treatment Diagnosis Information:  · Diagnosis: M25.561 (ICD-10-CM) - Acute pain of right knee  · Treatment Diagnosis: M25.561 R Knee Pain; M25.361 R Knee Instability; M62.559 Atrophy of unspecified thigh; Insurance/Certification information:  PT Insurance Information: [facility]; 30 PT visits; auth required  Physician Information:  Referring Practitioner: Dr. Mary Beth Ojeda  Has the plan of care been signed (Y/N):        []  Yes  [x]  No     Date of Patient follow up with Physician:  As needed      Is this a Progress Report:     []  Yes  [x]  No        If Yes:  Date Range for reporting period:  Beginnin/15/21  Ending:     Progress report will be due (10 Rx or 30 days whichever is less):       Recertification will be due (POC Duration  / 90 days whichever is less): 21        Visit # Insurance Allowable Auth Required    of       12 previous 20 PT approved 10/20-      30 VPCY [x]  Yes []  No      OUTCOME MEASURE DATE DEFICIT   WOMAC 7/15/21 79% Deficit   WOMAC 21 58% Deficit   WOMAC 10/7/21 50% Deficit   WOMAC 21 52% Deficit            Latex Allergy:  [x]NO      []YES  Preferred Language for Healthcare:   [x]English       []other:    Pain level: 0/10 at rest,     SUBJECTIVE:  No issues in knee after last visit. Feeling very fatigued today from work.          OBJECTIVE:        7/15/21 deferred  Flexibility L R Comment   Hamstring         Gastroc         ITB         Quad                      21  ROM PROM AROM Overpressure Comment     L R L R L R     Flexion    140  140       Extension     +3 +3                                                21  Strength L R Comment   Quad 5 4+    Hamstring 5 5     Gastroc         Hip  flexion        Hip abd 5 5     Hip Ext 4+ 4+     Hip IR         Hip ER            10/7/21  Special Test Results/Comment   Patellar Apprehension NT   Cavazos's Grind Test NT   Tejas's Unable to test due to lack of ROM   Janelle's NT   Joint Line Tenderness Neg   Valgus Laxity (-)   Varus Laxity (-)   Lachmans (+) on R   Drop Back NT   BELA NT   FADDIR NT   Scour NT          Girth L R   Mid Patella 46 46   Suprapatellar 50 48.5   5cm above 58 52.0   15cm above 68 65.5      Reflexes/Sensation: NT              []?Dermatomes/Myotomes intact               []?Reflexes equal and normal bilaterally              []?Other:     Joint mobility:                [x]? Normal: good patellar mobility                      []?Hypo              []?Hyper     Palpation: no TTP 11/11/21     Functional Mobility/Transfers: ind 10/7/21     Posture: mild varus     Bandages/Dressings/Incisions: n/a     Gait: (include devices/WB status) WNL 11/11/21    Orthopedic Special Tests: see above. Functional testing deferred.        RESTRICTIONS/PRECAUTIONS: ACL Tear / Medial meniscus tear / tibial plateau fracture Right knee; also on eliquis for blood clot  Exercises/Interventions:  Exercise/Equipment Resistance/Repetitions Other comments   Stretching       Hamstring 3x30\"    Hip Flexor     ITB     Figure 4     Quad     Inclined Calf 3x30\"                  ROM       EOB Self-Assist        Wall Slide     Manual     Biodex Passive     Recumbent Bike 5' Level 5 Seat 7      Hang Weights                Patellar Glides       Medial       Superior       Inferior               Isometrics           Add sets              SLR      Adducton     SLR+               Glutes       Bridges 3x10 Red   Supine Clams     S/L Clams 3x10 Both Red   Side Stepping       Monster walks                CKC       Weight Shift         Squatting 3x10 +10\" Holds last rep 15# Kettlebell goblet squat               PRE       Extension 3x10; 30# DL RANGE: 90-40   Flexion 3x10 ; 45# DL RANGE: Avail   Leg Press 3x10  DL; 120# RANGE: 80-10   Cable Column               Balance       SLS 3x20\" Airex               Other       Treadmill            Manual Interventions          Patient have access to gym? [] Yes  [] No  Patient have equipment at home? [] Yes  [] No       Patient Education:   7/15/21: Patient educated about PT diagnosis, prognosis, and plan of care; educated on role of physical therapy; educated on HEP; educated on anatomy of knee joint; discussed role of PT for ACL tear rehab; discussed rest/ice/elevation for swelling and symptom management; discussed using crutch(es) for ambulation until quad strength improves and she can walk without a limp. HEP:  Patient instructed on HEP on this date with handout provided and all questions answered. Discussions about how to progress sets/reps/resistance as necessary for fatigue and challenge. Patient was instructed to contact PT with any questions or concerns about HEP moving forward. Patient verbally stated she/he understood. Access Code: [email or web address]  URL: [email or web address]  Date: 09/13/2021  Prepared by:  [facility]    Exercises  Sitting Heel Slide with Towel - 3 x daily - 7 x weekly - 10 reps - 10 hold  Standing Gastroc Stretch on Step - 2 x daily - 7 x weekly - 3 reps - 30\" hold  Seated Table Hamstring Stretch - 2 x daily - 7 x weekly - 5 reps - 30\" hold  Long Sitting Quad Set - 10 x daily - 7 x weekly - 10 sets - 10\" hold  Active Straight Leg Raise with Quad Set - 1 x daily - 7 x weekly - 3 sets - 10 reps  Sidelying Hip Abduction - 1 x daily - 7 x weekly - 3 sets - 10 reps  Clamshell - 1 x daily - 7 x weekly - 3 sets - 10 reps  Standing Heel Raise - 1 x daily - 7 x weekly - 3 sets - 10 reps  Standing Terminal Knee Extension with Resistance - 1 x daily - 7 x weekly - 3 sets - 10 reps - 5 hold  Wall Quarter Squat - 1 x daily - 7 x weekly - 15\" hold  Single Leg Stance - 1 x daily - 7 x weekly - 3 sets - 20-30 hold  Step Up - 1 x daily - 7 x weekly - 3 sets - 10 reps  Seated Long Arc Quad (90-45 Degree Range) - 1 x daily - 7 x weekly - 3 sets - 10 reps      Therapeutic Exercise and NMR EXR  [x] (47469) Provided verbal/tactile cueing for activities related to strengthening, flexibility, endurance, ROM for improvements in LE, proximal hip, and core control with self care, mobility, lifting, ambulation. [x] (93355) Provided verbal/tactile cueing for activities related to improving balance, coordination, kinesthetic sense, posture, motor skill, proprioception  to assist with LE, proximal hip, and core control in self care, mobility, lifting, ambulation and eccentric single leg control.      NMR and Therapeutic Activities:    [x] (72262 or 84875) Provided verbal/tactile cueing for activities related to improving balance, coordination, kinesthetic sense, posture, motor skill, proprioception and motor activation to allow for proper function of core, proximal hip and LE with self care and ADLs  [] (63115) Gait Re-education- Provided training and instruction to the patient for proper LE, core and proximal hip recruitment and positioning and eccentric body weight control with ambulation re-education including up and down stairs     Home Exercise Program:    [x] (17379) Reviewed/Progressed HEP activities related to strengthening, flexibility, endurance, ROM of core, proximal hip and LE for functional self-care, mobility, lifting and ambulation/stair navigation   [] (40659)Reviewed/Progressed HEP activities related to improving balance, coordination, kinesthetic sense, posture, motor skill, proprioception of core, proximal hip and LE for self care, mobility, lifting, and ambulation/stair navigation      Manual Treatments:  PROM / STM / Oscillations-Mobs:  G-I, II, III, IV (PA's, Inf., Post.)  [x] (80504) Provided manual therapy to mobilize LE, proximal hip and/or LS spine soft tissue/joints for the purpose of modulating pain, promoting relaxation,  increasing ROM, reducing/eliminating soft tissue swelling/inflammation/restriction, improving soft tissue extensibility and allowing for proper ROM for normal function with self care, mobility, lifting and ambulation. Modalities: Declined    Charges:  Timed Code Treatment Minutes: 40   Total Treatment Minutes: 40   [id number]    [] EVAL (LOW) 02405 (typically 20 minutes face-to-face)  [] EVAL (MOD) 86940 (typically 30 minutes face-to-face)  [] EVAL (HIGH) 56202 (typically 45 minutes face-to-face)  [] RE-EVAL   [x] FELIBERTO(44071) x  2  [] IONTO  [] NMR (52481) x     [] VASO  [] Manual (19957) x      [] Other:  [x] TA x   1   [] Mech Traction (38971)  [] ES(attended) (26035)      [] ES (un) (72255):     GOALS:   Patient stated goal: Return to full work duties    [x] Progressing: [] Met: [] Not Met: [] Adjusted    Therapist goals for Patient:   Short Term Goals: To be achieved in: 2 weeks  1. Independent in HEP and progression per patient tolerance, in order to prevent re-injury. [] Progressing: [x] Met: [] Not Met: [] Adjusted   2. Patient will have a decrease in pain to facilitate improvement in movement, function, and ADLs as indicated by Functional Deficits. [] Progressing: [x] Met: [] Not Met: [] Adjusted    Long Term Goals: To be achieved in: 12 weeks  1. Disability index score of 39.5% or less for the [facility] to assist with reaching prior level of function. [x] Progressing: [] Met: [] Not Met: [] Adjusted  2. Patient will demonstrate increased AROM to 0-120 to allow for proper joint functioning as indicated by patients Functional Deficits. [] Progressing: [x] Met: [] Not Met: [] Adjusted  3. Patient will demonstrate an increase in Strength to 4+/5 in BLE to allow for proper functional mobility as indicated by patients Functional Deficits. [] Progressing: [x] Met: [] Not Met: [] Adjusted  4. Patient will return to ADL and other functional activities without increased symptoms or restriction.    [] Progressing: [x] Met: [] Not Met: [] Adjusted  5. Patient will ascend/descend a flight of stairs independently without pain or giving away in knee. (patient specific functional goal)    [x] Progressing: [] Met: [] Not Met: [] Adjusted     Overall Progression Towards Functional goals/ Treatment Progress Update:  [x] Patient is progressing as expected towards functional goals listed. [] Progression is slowed due to complexities/Impairments listed. [] Progression has been slowed due to co-morbidities. [] Plan just implemented, too soon to assess goals progression <30days   [] Goals require adjustment due to lack of progress  [] Patient is not progressing as expected and requires additional follow up with physician  [] Other    Prognosis for POC: [x] Good [] Fair  [] Poor      Patient requires continued skilled intervention: [x] Yes  [] No    Treatment/Activity Tolerance:  [x] Patient able to complete treatment  [] Patient limited by fatigue  [] Patient limited by pain     [] Patient limited by other medical complications  [] Other: Tolerated vistt well today with no pain in knee. Fatigues quickly with current program so progressions limited. Will focus on new exercises at next visit to give patient variation for HEP/gym program. Did review cardio machines for gym program today as well as goal of strength program for LE to be done 3-4x/week. Plan on discharge next visit to  [address] / gym program. Continue PT to improve patient's LE strength, endurance, and neuromuscular control of knee in order to decrease pain and instability in her knee. PLAN: 1-2x. week for 6 more weeks 11/11/21  [x] Continue per plan of care [] Alter current plan (see comments above)  [] Plan of care initiated [] Hold pending MD visit [] Discharge      Electronically signed by:  Ying Srivastava, PT, DPT, OCS    Note: If patient does not return for scheduled/ recommended follow up visits, this note will serve as a discharge from care along with most recent update on progress.

## 2021-12-23 ENCOUNTER — HOSPITAL ENCOUNTER (OUTPATIENT)
Dept: PHYSICAL THERAPY | Age: 41
Setting detail: THERAPIES SERIES
Discharge: HOME | End: 2021-12-23
Payer: COMMERCIAL

## 2022-01-24 ENCOUNTER — TELEPHONE (OUTPATIENT)
Dept: ORTHOPEDIC SURGERY | Age: 42
End: 2022-01-24

## 2022-01-24 NOTE — TELEPHONE ENCOUNTER
E-mailed the request for medical records for audit to [phone] with [facility].

## 2022-05-06 NOTE — FLOWSHEET NOTE
"     Follow Up Office Visit      Date: 2022   Patient Name: Joaquín Sinha  : 1983   MRN: [id number]     Chief Complaint:    Chief Complaint   Patient presents with   • Asthma       History of Present Illness: Joaquín Sinha is a 38 y.o. male who is here today to follow up with need for albuterol MDI refill.  He has mild intermittent asthma, triggered by environmental factors.  His current inhaler has lasted him since 2019 so he really has a low incidence of exacerbations.  He denies any other problems.  He has been doing well.  No recent injury or illness.      Subjective      Review of systems:  Review of Systems   Constitutional: Negative for fever.   HENT: Negative for trouble swallowing.    Respiratory: Negative for cough, shortness of breath and wheezing.    Cardiovascular: Negative for chest pain and leg swelling.   Gastrointestinal: Negative for abdominal pain.   Musculoskeletal: Negative for arthralgias.        I have reviewed and the following portions of the patient's history were updated as appropriate: past family history, past medical history, past social history, past surgical history and problem list.    Medications:     Current Outpatient Medications:   •  albuterol sulfate HFA (ProAir HFA) 108 (90 Base) MCG/ACT inhaler, Inhale 2 puffs Every 6 (Six) Hours As Needed for Wheezing., Disp: 8 g, Rfl: 3  •  buPROPion XL (WELLBUTRIN XL) 150 MG 24 hr tablet, Take 150 mg by mouth 2 (Two) Times a Day., Disp: , Rfl:   •  buPROPion XL (WELLBUTRIN XL) 300 MG 24 hr tablet, TAKE 1 TABLET BY MOUTH EVERY DAY WITH 150 MG PILL, Disp: , Rfl:   •  propranolol (INDERAL) 20 MG tablet, Take 1 tablet by mouth Daily for 30 days., Disp: 90 tablet, Rfl: 4    Allergies:   Allergies   Allergen Reactions   • Sulfa Antibiotics        Objective     Vital Signs:   Vitals:    22 0852   BP: 124/72   Pulse: 74   Resp: 20   Temp: 99 °F (37.2 °C)   SpO2: 98%   Weight: 81.2 kg (179 lb)   Height: 177.8 cm (70\") " [address] [facility], [place]        Physical Therapy Daily Treatment Note  Date:  10/11/2021    Patient Name:  Mandie Newman"  :  1980  MRN: [id number]  Restrictions/Precautions:    Medical/Treatment Diagnosis Information:  · Diagnosis: M25.561 (ICD-10-CM) - Acute pain of right knee  · Treatment Diagnosis: M25.561 R Knee Pain; M25.361 R Knee Instability; M62.559 Atrophy of unspecified thigh; Insurance/Certification information:  PT Insurance Information: [employer]; 30 PT visits; auth required  Physician Information:  Referring Practitioner: Dr. Jn Garrett  Has the plan of care been signed (Y/N):        [x]  Yes  []  No     Date of Patient follow up with Physician: 10/8/21      Is this a Progress Report:     []  Yes  [x]  No        If Yes:  Date Range for reporting period:  Beginnin/15/21  Ending:     Progress report will be due (10 Rx or 30 days whichever is less):       Recertification will be due (POC Duration  / 90 days whichever is less): 21        Visit # Insurance Allowable Auth Required    30 VPCY    Approved thru 10/15/21 [x]  Yes []  No      OUTCOME MEASURE DATE DEFICIT   WOMAC 7/15/21 79% Deficit   WOMAC 21 58% Deficit   WOMAC 10/7/21 50% Deficit            Latex Allergy:  [x]NO      []YES  Preferred Language for Healthcare:   [x]English       []other:    Pain level: 0/10 at rest,     SUBJECTIVE:  Doing well. Weaning herself off crutch at home and even didn't bring it to work today. Getting new brace from Dr. Richmond Balbuena to begin standing/walking at work.      OBJECTIVE:        7/15/21 deferred  Flexibility L R Comment   Hamstring         Gastroc         ITB         Quad                      10/7/21  ROM PROM AROM Overpressure Comment     L R L R L R     Flexion    145   140       Extension     +3 +3                                                10/7/21  Strength L R Comment   Quad 5 4    Hamstring 4+ 4+     Gastroc         PainSc: 0-No pain     Body mass index is 25.68 kg/m².   BMI is >= 25 and < 30. (Overweight) The following options were offered after discussion: weight loss educational material (shared in after visit summary)      Physical Exam:   Physical Exam  Vitals and nursing note reviewed.   Constitutional:       Appearance: Normal appearance.   HENT:      Head: Normocephalic and atraumatic.      Right Ear: Tympanic membrane and ear canal normal.      Left Ear: Ear canal normal.      Nose: Nose normal.      Mouth/Throat:      Mouth: Mucous membranes are moist.      Pharynx: Oropharynx is clear.   Eyes:      General: No scleral icterus.  Neck:      Thyroid: No thyromegaly.   Cardiovascular:      Rate and Rhythm: Normal rate and regular rhythm.      Heart sounds: Normal heart sounds.   Pulmonary:      Effort: Pulmonary effort is normal.      Breath sounds: Normal breath sounds. No wheezing or rales.   Musculoskeletal:      Cervical back: Neck supple.      Right lower leg: No edema.      Left lower leg: No edema.   Lymphadenopathy:      Cervical: No cervical adenopathy.   Neurological:      Mental Status: He is alert.          Assessment / Plan      Assessment/Plan:   Diagnoses and all orders for this visit:    1. Mild intermittent asthma without complication (Primary)  -     albuterol sulfate HFA (ProAir HFA) 108 (90 Base) MCG/ACT inhaler; Inhale 2 puffs Every 6 (Six) Hours As Needed for Wheezing.  Dispense: 8 g; Refill: 3       Albuterol MDI renewed.  Briefly discussed need for annual exam and fasting labs, preventive medicine updates.  He states he will consider this and try to schedule an annual this year.  Follow Up:   Return for Annual.    Barbara Ken PA-C   [facility]   Hip  flexion         Hip abd 5 4+     Hip Ext 4+ 4+     Hip IR         Hip ER            10/7/21  Special Test Results/Comment   Patellar Apprehension NT   Cavazos's Grind Test NT   Tejas's Unable to test due to lack of ROM   Thedunia's NT   Joint Line Tenderness Neg   Valgus Laxity (-)   Varus Laxity (-)   Lachmans (+) on R   Drop Back NT   BELA NT   FADDIR NT   Scour NT          Girth L R   Mid Patella 46 46   Suprapatellar 50 48.5   5cm above 58 52.0   15cm above 68 65.5      Reflexes/Sensation: NT              []?Dermatomes/Myotomes intact               []?Reflexes equal and normal bilaterally              []?Other:     Joint mobility:                [x]? Normal: good patellar mobility                      []?Hypo              []?Hyper     Palpation: Mild TTP at MJL; no TTP at pes anserine infrapatellar fat pads,  LJL tenderness, IT Band and gerdy's tubercle tenderness 10/7/21     Functional Mobility/Transfers: ind 10/7/21     Posture: mild varus     Bandages/Dressings/Incisions: n/a     Gait: (include devices/WB status) slow, deliberate, but without AD 10/7/21    Orthopedic Special Tests: see above. Functional testing deferred.        RESTRICTIONS/PRECAUTIONS: ACL Tear / Medial meniscus tear / tibial plateau fracture Right knee; also on eliquis for blood clot  Exercises/Interventions:  Exercise/Equipment Resistance/Repetitions Other comments   Stretching       Hamstring 3x30\"    Hip Flexor     ITB     Figure 4     Quad     Inclined Calf 3x30\"               ROM       EOB Self-Assist        Wall Slide     Manual     Biodex Passive     Recumbent Bike 5' Level 5 Seat 7      Hang Weights                Patellar Glides       Medial       Superior       Inferior               Isometrics           Add sets              SLR  HEP     Adducton     SLR+               Glutes       Bridges     Supine Clams     S/L Clams 3x10 Both Green   Side Stepping       Monster walks               CKC       Weight Shift     Calf raises 3x10; Wall sits 3xfatigue     Squatting 3x10    CC TKE 2x10x5\" holds 5pl   Stool scoot 1 lap down/back             PRE       Extension 3x10; 15# DL RANGE: 90-40   Flexion 3x10; 40# DL RANGE: Avail   Leg Press 3x10 DL; 100# RANGE: 80-10   Cable Column               Balance       Tilt board x20 taps, x20\" hold Medial/lateral + Anterior/posterior   SLS 3x20\" Airex               Other       Treadmill            Manual Interventions          Patient have access to gym? [] Yes  [] No  Patient have equipment at home? [] Yes  [] No       Patient Education:   7/15/21: Patient educated about PT diagnosis, prognosis, and plan of care; educated on role of physical therapy; educated on HEP; educated on anatomy of knee joint; discussed role of PT for ACL tear rehab; discussed rest/ice/elevation for swelling and symptom management; discussed using crutch(es) for ambulation until quad strength improves and she can walk without a limp. HEP:  Patient instructed on HEP on this date with handout provided and all questions answered. Discussions about how to progress sets/reps/resistance as necessary for fatigue and challenge. Patient was instructed to contact PT with any questions or concerns about HEP moving forward. Patient verbally stated she/he understood. Access Code: [email or web address]  URL: [email or web address]  Date: 09/13/2021  Prepared by:  Abram Foss    Exercises  Sitting Heel Slide with Towel - 3 x daily - 7 x weekly - 10 reps - 10 hold  Standing Gastroc Stretch on Step - 2 x daily - 7 x weekly - 3 reps - 30\" hold  Seated Table Hamstring Stretch - 2 x daily - 7 x weekly - 5 reps - 30\" hold  Long Sitting Quad Set - 10 x daily - 7 x weekly - 10 sets - 10\" hold  Active Straight Leg Raise with Quad Set - 1 x daily - 7 x weekly - 3 sets - 10 reps  Sidelying Hip Abduction - 1 x daily - 7 x weekly - 3 sets - 10 reps  Clamshell - 1 x daily - 7 x weekly - 3 sets - 10 reps  Standing Heel Raise - 1 x daily - 7 x weekly - 3 sets - 10 reps  Standing Terminal Knee Extension with Resistance - 1 x daily - 7 x weekly - 3 sets - 10 reps - 5 hold  Wall Quarter Squat - 1 x daily - 7 x weekly - 15\" hold  Single Leg Stance - 1 x daily - 7 x weekly - 3 sets - 20-30 hold  Step Up - 1 x daily - 7 x weekly - 3 sets - 10 reps  Seated Long Arc Quad (90-45 Degree Range) - 1 x daily - 7 x weekly - 3 sets - 10 reps      Therapeutic Exercise and NMR EXR  [x] (19441) Provided verbal/tactile cueing for activities related to strengthening, flexibility, endurance, ROM for improvements in LE, proximal hip, and core control with self care, mobility, lifting, ambulation. [x] (49068) Provided verbal/tactile cueing for activities related to improving balance, coordination, kinesthetic sense, posture, motor skill, proprioception  to assist with LE, proximal hip, and core control in self care, mobility, lifting, ambulation and eccentric single leg control.      NMR and Therapeutic Activities:    [x] (65646 or 12676) Provided verbal/tactile cueing for activities related to improving balance, coordination, kinesthetic sense, posture, motor skill, proprioception and motor activation to allow for proper function of core, proximal hip and LE with self care and ADLs  [] (08991) Gait Re-education- Provided training and instruction to the patient for proper LE, core and proximal hip recruitment and positioning and eccentric body weight control with ambulation re-education including up and down stairs     Home Exercise Program:    [x] (01368) Reviewed/Progressed HEP activities related to strengthening, flexibility, endurance, ROM of core, proximal hip and LE for functional self-care, mobility, lifting and ambulation/stair navigation   [] (78308)Reviewed/Progressed HEP activities related to improving balance, coordination, kinesthetic sense, posture, motor skill, proprioception of core, proximal hip and LE for self care, mobility, lifting, and ambulation/stair navigation Manual Treatments:  PROM / STM / Oscillations-Mobs:  G-I, II, III, IV (PA's, Inf., Post.)  [x] (91559) Provided manual therapy to mobilize LE, proximal hip and/or LS spine soft tissue/joints for the purpose of modulating pain, promoting relaxation,  increasing ROM, reducing/eliminating soft tissue swelling/inflammation/restriction, improving soft tissue extensibility and allowing for proper ROM for normal function with self care, mobility, lifting and ambulation. Modalities: CP 15'    Charges:  Timed Code Treatment Minutes: 40   Total Treatment Minutes: 55   305-415    [] EVAL (LOW) 00175 (typically 20 minutes face-to-face)  [] EVAL (MOD) 46882 (typically 30 minutes face-to-face)  [] EVAL (HIGH) 18096 (typically 45 minutes face-to-face)  [] RE-EVAL   [x] BC(33734) x  1  [] IONTO  [x] NMR (36772) x     [] VASO  [] Manual (21840) x      [] Other:  [x] TA x   1   [] Mech Traction (41721)  [] ES(attended) (19004)      [] ES (un) (16149):     GOALS:   Patient stated goal: Return to full work duties    [x] Progressing: [] Met: [] Not Met: [] Adjusted    Therapist goals for Patient:   Short Term Goals: To be achieved in: 2 weeks  1. Independent in HEP and progression per patient tolerance, in order to prevent re-injury. [] Progressing: [x] Met: [] Not Met: [] Adjusted   2. Patient will have a decrease in pain to facilitate improvement in movement, function, and ADLs as indicated by Functional Deficits. [] Progressing: [x] Met: [] Not Met: [] Adjusted    Long Term Goals: To be achieved in: 12 weeks  1. Disability index score of 39.5% or less for the [facility] to assist with reaching prior level of function. [x] Progressing: [] Met: [] Not Met: [] Adjusted  2. Patient will demonstrate increased AROM to 0-120 to allow for proper joint functioning as indicated by patients Functional Deficits. [] Progressing: [x] Met: [] Not Met: [] Adjusted  3.  Patient will demonstrate an increase in Strength to 4+/5 in BLE to allow for proper functional mobility as indicated by patients Functional Deficits. [] Progressing: [x] Met: [] Not Met: [] Adjusted  4. Patient will return to ADL and other functional activities without increased symptoms or restriction. [x] Progressing: [] Met: [] Not Met: [] Adjusted  5. Patient will ascend/descend a flight of stairs independently without pain or giving away in knee. (patient specific functional goal)    [x] Progressing: [] Met: [] Not Met: [] Adjusted     Overall Progression Towards Functional goals/ Treatment Progress Update:  [x] Patient is progressing as expected towards functional goals listed. [] Progression is slowed due to complexities/Impairments listed. [] Progression has been slowed due to co-morbidities. [] Plan just implemented, too soon to assess goals progression <30days   [] Goals require adjustment due to lack of progress  [] Patient is not progressing as expected and requires additional follow up with physician  [] Other    Prognosis for POC: [x] Good [] Fair  [] Poor      Patient requires continued skilled intervention: [x] Yes  [] No    Treatment/Activity Tolerance:  [x] Patient able to complete treatment  [] Patient limited by fatigue  [] Patient limited by pain     [] Patient limited by other medical complications  [] Other: Good tolerance to visit today but fatigued quickly with new exercises. No pain in knee to report. Has decreased strength/endurance/activity tolerance overall from injury but slowly improving. Will continue to progress patient per tolerance to address on-going deficits in ROM, strength, flexibility, endurance and neuromuscular control to allow for safe return to PLOF.         PLAN: 2x.week for 12 more weeks 10/7/21  [x] Continue per plan of care [] Alter current plan (see comments above)  [] Plan of care initiated [] Hold pending MD visit [] Discharge      Electronically signed by:  Brit Simmons, PT, DPT, OCS    Note: If patient does not return for scheduled/ recommended follow up visits, this note will serve as a discharge from care along with most recent update on progress.

## 2022-06-15 ENCOUNTER — PATIENT MESSAGE (OUTPATIENT)
Dept: ORTHOPEDIC SURGERY | Age: 42
End: 2022-06-15

## 2022-06-15 NOTE — TELEPHONE ENCOUNTER
Called and spoke with patient. She states that she has a lot of right knee pain and swelling. She is unable to work and is going home. Instructed patient to ice and elevate. Patient scheduled for appointment tomorrow 6-16-22.

## 2022-06-15 NOTE — TELEPHONE ENCOUNTER
From: Vidya Barone  To: Dr. Johanna English: 6/15/2022 10:04 AM EDT  Subject: Knee     My knee is swallon i think its building up fluid again hard go bend and lots of pain and pressure

## 2022-06-16 ENCOUNTER — OFFICE VISIT (OUTPATIENT)
Dept: ORTHOPEDIC SURGERY | Age: 42
End: 2022-06-16
Payer: COMMERCIAL

## 2022-06-16 VITALS — WEIGHT: 217 LBS | BODY MASS INDEX: 36.15 KG/M2 | HEIGHT: 65 IN

## 2022-06-16 DIAGNOSIS — M25.561 ACUTE PAIN OF RIGHT KNEE: ICD-10-CM

## 2022-06-16 DIAGNOSIS — M25.461 KNEE EFFUSION, RIGHT: ICD-10-CM

## 2022-06-16 DIAGNOSIS — S83.511A CHRONIC RUPTURE OF ACL OF RIGHT KNEE: Primary | ICD-10-CM

## 2022-06-16 PROCEDURE — 20610 DRAIN/INJ JOINT/BURSA W/O US: CPT | Performed by: ORTHOPAEDIC SURGERY

## 2022-06-16 RX ORDER — LIDOCAINE HYDROCHLORIDE 10 MG/ML
3 INJECTION, SOLUTION INFILTRATION; PERINEURAL ONCE
Status: COMPLETED | OUTPATIENT
Start: 2022-06-16 | End: 2022-06-16

## 2022-06-16 RX ORDER — ALBUTEROL SULFATE 90 UG/1
2 AEROSOL, METERED RESPIRATORY (INHALATION) EVERY 6 HOURS PRN
COMMUNITY
Start: 2022-01-02 | End: 2022-06-16

## 2022-06-16 RX ORDER — METHYLPREDNISOLONE ACETATE 40 MG/ML
80 INJECTION, SUSPENSION INTRA-ARTICULAR; INTRALESIONAL; INTRAMUSCULAR; SOFT TISSUE ONCE
Status: COMPLETED | OUTPATIENT
Start: 2022-06-16 | End: 2022-06-16

## 2022-06-16 RX ORDER — FOLIC ACID 1 MG/1
1 TABLET ORAL DAILY
COMMUNITY
Start: 2022-02-23

## 2022-06-16 RX ORDER — LAMOTRIGINE 100 MG/1
100 TABLET ORAL 2 TIMES DAILY
COMMUNITY
Start: 2022-03-08 | End: 2022-06-16

## 2022-06-16 RX ORDER — ACETAMINOPHEN 160 MG
TABLET,DISINTEGRATING ORAL
COMMUNITY
Start: 2022-06-01

## 2022-06-16 RX ADMIN — METHYLPREDNISOLONE ACETATE 80 MG: 40 INJECTION, SUSPENSION INTRA-ARTICULAR; INTRALESIONAL; INTRAMUSCULAR; SOFT TISSUE at 10:42

## 2022-06-16 RX ADMIN — LIDOCAINE HYDROCHLORIDE 3 ML: 10 INJECTION, SOLUTION INFILTRATION; PERINEURAL at 10:42

## 2022-06-16 NOTE — PROGRESS NOTES
Emily Carlin returns today with some recurrent discomfort in her right knee. She has been doing very well since I last saw her in November. She has been able to wean from her brace but had some recurrent swelling couple days ago and now has increasing pain. It is about 5 out of 10. General Exam:    Vitals: Height 5' 5\" (1.651 m), weight 217 lb (98.4 kg), not currently breastfeeding. Constitutional: Patient is adequately groomed with no evidence of malnutrition  Mental Status: The patient is oriented to time, place and person. The patient's mood and affect are appropriate. Right knee today has a large effusion. Lachman is consistent with increased anterior translation. She has no joint line tenderness. Assessment: Exacerbation of right knee pain with effusion with chronic ACL tear. Plan: I still do not believe surgical treatment is the right thing for her. I am encouraging her to wear her brace and continue with her exercise program.  We will aspirate and inject the knee today. She can return to work on Saturday. Procedure: Under sterile technique, the right knee was anesthetized in the suprapatellar pouch. Then aspirate the right knee of about 40 cc of serous fluid. Decompressed nicely. Right knee was then injected with 80 mg of Depo-Medrol. Follow-up with me for ongoing issues.

## 2022-06-16 NOTE — LETTER
[facility] [address]  Phone: [phone]  Fax: [phone]    Derek Verdin MD        June 16, 2022     Patient: Maryann Lovell   YOB: 1980   Date of Visit: 6/16/2022       To Whom It May Concern: It is my medical opinion that Maryann Lovell may return to work on 06-18-22. If you have any questions or concerns, please don't hesitate to call.     Sincerely,        Derek Verdin MD

## 2023-01-01 NOTE — FLOWSHEET NOTE
[address]    Physical Therapy  Cancellation/No-show Note  Patient Name:  Sarah Acosta  :  1980   Date:  11/15/2021  Cancelled visits to date: 10  No-shows to date: 2    For today's appointment patient:  []  Cancelled  []  Rescheduled appointment  [x]  No-show     Reason given by patient:  [x]  Patient ill  []  Conflicting appointment   []  No transportation    []  Conflict with work  [x]  No reason given  [x]  Other:  Called 15 minutes after missed appointment and left voicemail.     Comments:      Electronically signed by:  Joey Quan, PT, DPT, OCS Preventive Care Visit  [facility]  Maurice Cardona MD, Family Medicine  Dec 15, 2023    Assessment & Plan   7 month old, here for preventive care.    (Z00.129) Encounter for routine child health examination w/o abnormal findings  (primary encounter diagnosis)  Comment:   Plan: Maternal Health Risk Assessment (56889) - EPDS,        DTAP/IPV/HIB/HEPB 6W-4Y (VAXELIS), PNEUMOCOCCAL        CONJUGATE PCV 13 (PREVNAR 13), ROTAVIRUS,         PENTAVALENT 3-DOSE (ROTATEQ), PRIMARY CARE         FOLLOW-UP SCHEDULING          Patient has been advised of split billing requirements and indicates understanding: Yes  Growth      Normal OFC, length and weight    Immunizations   Appropriate vaccinations were ordered.    Anticipatory Guidance    Reviewed age appropriate anticipatory guidance.   Reviewed Anticipatory Guidance in patient instructions    Referrals/Ongoing Specialty Care  None  Verbal Dental Referral: No teeth yet  Dental Fluoride Varnish: No, no teeth yet.      Subjective   Ángel is presenting for the following:  Well Child          2023     3:03 PM   Additional Questions   Accompanied by Parent and brother   Questions for today's visit No   Surgery, major illness, or injury since last physical No       [place]  Depression Scale (EPDS) Risk Assessment: Completed [place]        2023   Social   Lives with Parent(s)   Who takes care of your child? Parent(s)   Recent potential stressors None   History of trauma No   Family Hx mental health challenges No   Lack of transportation has limited access to appts/meds No   Do you have housing?  Yes   Are you worried about losing your housing? No         2023     2:57 PM   Health Risks/Safety   What type of car seat does your child use?  Infant car seat   Is your child's car seat forward or rear facing? Rear facing   Where does your child sit in the car?  Back seat   Are stairs gated at home? Yes   Do you use space heaters, wood  stove, or a fireplace in your home? No   Are poisons/cleaning supplies and medications kept out of reach? Yes   Do you have guns/firearms in the home? No            2023     2:57 PM   TB Screening: Consider immunosuppression as a risk factor for TB   Recent TB infection or positive TB test in family/close contacts No   Recent travel outside [place] (child/family/close contacts) No   Recent residence in high-risk group setting (correctional facility/health care facility/homeless shelter/refugee camp) No          2023     2:57 PM   Dental Screening   Have parents/caregivers/siblings had cavities in the last 2 years? No         2023   Diet   Do you have questions about feeding your baby? No   What does your baby eat? Formula   Formula type FML   How does your baby eat? Bottle   Vitamin or supplement use None   In past 12 months, concerned food might run out No   In past 12 months, food has run out/couldn't afford more No         2023     2:57 PM   Elimination   Bowel or bladder concerns? No concerns         2023     2:57 PM   Media Use   Hours per day of screen time (for entertainment) 0         2023     2:57 PM   Sleep   Do you have any concerns about your child's sleep? No concerns, regular bedtime routine and sleeps well through the night   Where does your baby sleep? [facility]    (!) PARENT(S) BED   In what position does your baby sleep? Back    (!) SIDE         2023     2:57 PM   Vision/Hearing   Vision or hearing concerns No concerns         2023     2:57 PM   Development/ Social-Emotional Screen   Developmental concerns No   Does your child receive any special services? No     Development    Screening too used, reviewed with parent or guardian: No screening tool used  Milestones (by observation/ exam/ report) 75-90% ile  SOCIAL/EMOTIONAL:   Knows familiar people   Likes to look at self in mirror   Laughs  LANGUAGE/COMMUNICATION:   Takes turns making sounds with you    "Blows raspberries (Sticks tongue out and blows)   Makes squealing noises  COGNITIVE (LEARNING, THINKING, PROBLEM-SOLVING):   Puts things in their mouth to explore them   Reaches to grab a toy they want   Closes lips to show they don't want more food  MOVEMENT/PHYSICAL DEVELOPMENT:   Rolls from tummy to back   Pushes up with straight arms when on tummy   Leans on hands to support self when sitting         Objective     Exam  Pulse 130   Temp 99.3  F (37.4  C) (Axillary)   Resp 28   Ht 0.686 m (2' 3\")   Wt 7.717 kg (17 lb 0.2 oz)   BMI 16.41 kg/m    No head circumference on file for this encounter.  22 %ile (Z= -0.76) based on WHO (Boys, 0-2 years) weight-for-age data using vitals from 2023.  32 %ile (Z= -0.46) based on WHO (Boys, 0-2 years) Length-for-age data based on Length recorded on 2023.  28 %ile (Z= -0.59) based on WHO (Boys, 0-2 years) weight-for-recumbent length data based on body measurements available as of 2023.    Physical Exam  GENERAL: Active, alert, in no acute distress.  SKIN: Clear. No significant rash, abnormal pigmentation or lesions  HEAD: Normocephalic. Normal fontanels and sutures.  EYES: Conjunctivae and cornea normal. Red reflexes present bilaterally.  EARS: Normal canals. Tympanic membranes are normal; gray and translucent.  NOSE: Normal without discharge.  MOUTH/THROAT: Clear. No oral lesions.  NECK: Supple, no masses.  LYMPH NODES: No adenopathy  LUNGS: Clear. No rales, rhonchi, wheezing or retractions  HEART: Regular rhythm. Normal S1/S2. No murmurs. Normal femoral pulses.  ABDOMEN: Soft, non-tender, not distended, no masses or hepatosplenomegaly. Normal umbilicus and bowel sounds.   GENITALIA: Normal male external genitalia. Master stage I,  Testes descended bilaterally, no hernia or hydrocele.    EXTREMITIES: Hips normal with negative Ortolani and Ricardo. Symmetric creases and  no deformities  NEUROLOGIC: Normal tone throughout. Normal reflexes for age      Ali " MD [place]  [facility]

## 2023-02-24 ENCOUNTER — PATIENT MESSAGE (OUTPATIENT)
Dept: ORTHOPEDIC SURGERY | Age: 43
End: 2023-02-24

## 2023-02-27 ENCOUNTER — OFFICE VISIT (OUTPATIENT)
Dept: ORTHOPEDIC SURGERY | Age: 43
End: 2023-02-27
Payer: COMMERCIAL

## 2023-02-27 VITALS — BODY MASS INDEX: 35.82 KG/M2 | HEIGHT: 65 IN | WEIGHT: 215 LBS

## 2023-02-27 DIAGNOSIS — M25.461 KNEE EFFUSION, RIGHT: ICD-10-CM

## 2023-02-27 DIAGNOSIS — S83.511A CHRONIC RUPTURE OF ACL OF RIGHT KNEE: ICD-10-CM

## 2023-02-27 DIAGNOSIS — M25.561 ACUTE PAIN OF RIGHT KNEE: Primary | ICD-10-CM

## 2023-02-27 DIAGNOSIS — M25.562 ACUTE PAIN OF LEFT KNEE: ICD-10-CM

## 2023-02-27 DIAGNOSIS — M25.462 EFFUSION OF LEFT KNEE JOINT: ICD-10-CM

## 2023-02-27 PROCEDURE — G8417 CALC BMI ABV UP PARAM F/U: HCPCS | Performed by: ORTHOPAEDIC SURGERY

## 2023-02-27 PROCEDURE — G8427 DOCREV CUR MEDS BY ELIG CLIN: HCPCS | Performed by: ORTHOPAEDIC SURGERY

## 2023-02-27 PROCEDURE — 4004F PT TOBACCO SCREEN RCVD TLK: CPT | Performed by: ORTHOPAEDIC SURGERY

## 2023-02-27 PROCEDURE — G8484 FLU IMMUNIZE NO ADMIN: HCPCS | Performed by: ORTHOPAEDIC SURGERY

## 2023-02-27 PROCEDURE — 99214 OFFICE O/P EST MOD 30 MIN: CPT | Performed by: ORTHOPAEDIC SURGERY

## 2023-02-27 PROCEDURE — 20610 DRAIN/INJ JOINT/BURSA W/O US: CPT | Performed by: ORTHOPAEDIC SURGERY

## 2023-02-27 RX ORDER — METHYLPREDNISOLONE ACETATE 40 MG/ML
80 INJECTION, SUSPENSION INTRA-ARTICULAR; INTRALESIONAL; INTRAMUSCULAR; SOFT TISSUE ONCE
Status: COMPLETED | OUTPATIENT
Start: 2023-02-27 | End: 2023-02-27

## 2023-02-27 RX ORDER — SUMATRIPTAN 25 MG/1
TABLET, FILM COATED ORAL
COMMUNITY
Start: 2023-02-07

## 2023-02-27 RX ORDER — VERAPAMIL HYDROCHLORIDE 80 MG/1
80 TABLET ORAL 2 TIMES DAILY
COMMUNITY
Start: 2022-09-27

## 2023-02-27 RX ORDER — LIDOCAINE HYDROCHLORIDE 10 MG/ML
3 INJECTION, SOLUTION INFILTRATION; PERINEURAL ONCE
Status: COMPLETED | OUTPATIENT
Start: 2023-02-27 | End: 2023-02-27

## 2023-02-27 RX ORDER — POLYETHYLENE GLYCOL 3350, SODIUM CHLORIDE, SODIUM BICARBONATE, POTASSIUM CHLORIDE 420; 11.2; 5.72; 1.48 G/4L; G/4L; G/4L; G/4L
POWDER, FOR SOLUTION ORAL
COMMUNITY
Start: 2022-11-14

## 2023-02-27 RX ORDER — LEVETIRACETAM 750 MG/1
TABLET ORAL
COMMUNITY
Start: 2023-02-21

## 2023-02-27 RX ORDER — LAMOTRIGINE 100 MG/1
TABLET ORAL
COMMUNITY
Start: 2023-02-24

## 2023-02-27 RX ORDER — LORAZEPAM 0.5 MG/1
TABLET ORAL
COMMUNITY
Start: 2022-11-29

## 2023-02-27 RX ORDER — OMEPRAZOLE 20 MG/1
20 CAPSULE, DELAYED RELEASE ORAL
COMMUNITY
Start: 2022-11-14

## 2023-02-27 RX ADMIN — LIDOCAINE HYDROCHLORIDE 3 ML: 10 INJECTION, SOLUTION INFILTRATION; PERINEURAL at 13:30

## 2023-02-27 RX ADMIN — METHYLPREDNISOLONE ACETATE 80 MG: 40 INJECTION, SUSPENSION INTRA-ARTICULAR; INTRALESIONAL; INTRAMUSCULAR; SOFT TISSUE at 13:30

## 2023-02-27 RX ADMIN — METHYLPREDNISOLONE ACETATE 80 MG: 40 INJECTION, SUSPENSION INTRA-ARTICULAR; INTRALESIONAL; INTRAMUSCULAR; SOFT TISSUE at 13:31

## 2023-02-27 RX ADMIN — LIDOCAINE HYDROCHLORIDE 3 ML: 10 INJECTION, SOLUTION INFILTRATION; PERINEURAL at 13:29

## 2023-02-27 NOTE — LETTER
[address] [place] [address] [id number]  Phone: [phone]  Fax: [phone]    Sharda Gage MD        February 27, 2023     Patient: Kathy Reynaga   YOB: 1980   Date of Visit: 2/27/2023       To Whom it May Concern:    Kathy Reynaga was seen in my clinic on 2/27/2023. If you have any questions or concerns, please don't hesitate to call.     Sincerely,           Sharda Gage MD

## 2023-02-27 NOTE — PROGRESS NOTES
Jacqueline Markgrisel \"Kim\" seen today for evaluation of both knees. I never evaluated the left. 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 She has been treated conservatively. She has a functional brace. Her most recent aspiration was in June 2022. She says she has been doing very well but about 2 weeks ago began having increasing pain which she says is intermittently right worse than left but mostly equal.  She is wearing an over-the-counter knee brace for the left knee. She has had swelling. Pain is as bad as 7 out of 10. She continues to use a heating pad as well as Epsom salt baths and ice. She continues with her home exercise program.  She has pain with stairs, squatting, and getting up from a seated position. She has migraine headaches and high blood pressure. She is a manager of [employer]. History: Patient's relevant past family, medical, and social history are reviewed as part of today's visit. ROS of pertinent positives and negatives as above; otherwise negative. General Exam:    Vitals: Height 5' 5\" (1.651 m), weight 215 lb (97.5 kg), not currently breastfeeding. Constitutional: Patient is adequately groomed with no evidence of malnutrition  Mental Status: The patient is oriented to time, place and person. The patient's mood and affect are appropriate. Gait:  Patient walks with normal gait and station. Lymphatic: The lymphatic examination bilaterally reveals all areas to be without enlargement or induration. Vascular: Examination reveals no swelling or calf tenderness. Peripheral pulses are palpable and 2+. Neurological: The patient has good coordination. There is no weakness or sensory deficit. Skin:    Head/Neck: inspection reveals no rashes, ulcerations or lesions. Trunk:  inspection reveals no rashes, ulcerations or lesions. Right Lower Extremity: inspection reveals no rashes, ulcerations or lesions.   Left Lower Extremity: inspection reveals no rashes, ulcerations or lesions. Right knee has a large effusion. She has mildly increased anterior translation but no joint line pain or restriction of motion. Calf is soft. On the left side she has moderate effusion with no instability she has mild medial lateral joint line tenderness with no restriction in range of motion. Calf is soft. X-rays obtained today in the office and interpreted by me AP standing, PA flex, merchant, lateral views of both knees. These demonstrate: Severe degenerative change to the left knee with more moderate on the right both are principally focused at the medial and patellofemoral joints. Assessment: Exacerbation of degenerative change in the right knee compounded by chronic ACL deficiency, arthritis left knee. Plan: I obtained verbal consent for both procedures. Under sterile technique, right knee was anesthetized in the suprapatellar pouch. I then aspirated the right knee of approximately 40 cc of serous fluid  The right knee was then injected with 80 mg of Depo-Medrol    Under sterile technique left knee was anesthetized in the suprapatellar pouch. Then aspirated the left knee of approximately 10 to 15 cc of serosanguineous fluid  Left knee was then injected with 80 mg of Depo-Medrol.

## 2023-02-27 NOTE — TELEPHONE ENCOUNTER
From: Patti Llanes  To: Dr. Gallagher Drop: 2/24/2023 11:52 AM EST  Subject: Knee again    I have fluid on both knees.  Can i make an Appointment for Monday for draining

## 2023-06-28 ENCOUNTER — HOSPITAL ENCOUNTER (OUTPATIENT)
Age: 43
Setting detail: OBSERVATION
Discharge: HOME | End: 2023-06-28
Attending: EMERGENCY MEDICINE | Admitting: HOSPITALIST
Payer: COMMERCIAL

## 2023-06-28 ENCOUNTER — APPOINTMENT (OUTPATIENT)
Dept: GENERAL RADIOLOGY | Age: 43
End: 2023-06-28
Payer: COMMERCIAL

## 2023-06-28 VITALS
BODY MASS INDEX: 34.99 KG/M2 | DIASTOLIC BLOOD PRESSURE: 76 MMHG | RESPIRATION RATE: 18 BRPM | HEIGHT: 65 IN | WEIGHT: 210 LBS | HEART RATE: 71 BPM | OXYGEN SATURATION: 96 % | TEMPERATURE: 98.1 F | SYSTOLIC BLOOD PRESSURE: 123 MMHG

## 2023-06-28 DIAGNOSIS — R07.9 CHEST PAIN, UNSPECIFIED TYPE: Primary | ICD-10-CM

## 2023-06-28 LAB
ANION GAP SERPL CALCULATED.3IONS-SCNC: 10 MMOL/L (ref 3–16)
BASOPHILS # BLD: 0 K/UL (ref 0–0.2)
BASOPHILS NFR BLD: 0.3 %
BUN SERPL-MCNC: 8 MG/DL (ref 7–20)
CALCIUM SERPL-MCNC: 9.3 MG/DL (ref 8.3–10.6)
CHLORIDE SERPL-SCNC: 104 MMOL/L (ref 99–110)
CO2 SERPL-SCNC: 24 MMOL/L (ref 21–32)
CREAT SERPL-MCNC: 0.8 MG/DL (ref 0.6–1.1)
D DIMER: 0.38 UG/ML FEU (ref 0–0.6)
DEPRECATED RDW RBC AUTO: 15.3 % (ref 12.4–15.4)
EKG ATRIAL RATE: 68 BPM
EKG DIAGNOSIS: NORMAL
EKG P AXIS: 57 DEGREES
EKG P-R INTERVAL: 154 MS
EKG Q-T INTERVAL: 378 MS
EKG QRS DURATION: 76 MS
EKG QTC CALCULATION (BAZETT): 401 MS
EKG R AXIS: 20 DEGREES
EKG T AXIS: 26 DEGREES
EKG VENTRICULAR RATE: 68 BPM
EOSINOPHIL # BLD: 0.2 K/UL (ref 0–0.6)
EOSINOPHIL NFR BLD: 3 %
GFR SERPLBLD CREATININE-BSD FMLA CKD-EPI: >60 ML/MIN/{1.73_M2}
GLUCOSE SERPL-MCNC: 88 MG/DL (ref 70–99)
HCG SERPL QL: NEGATIVE
HCG UR QL: NEGATIVE
HCT VFR BLD AUTO: 33.4 % (ref 36–48)
HGB BLD-MCNC: 11.6 G/DL (ref 12–16)
LYMPHOCYTES # BLD: 1.3 K/UL (ref 1–5.1)
LYMPHOCYTES NFR BLD: 25.1 %
MCH RBC QN AUTO: 31.6 PG (ref 26–34)
MCHC RBC AUTO-ENTMCNC: 34.6 G/DL (ref 31–36)
MCV RBC AUTO: 91.2 FL (ref 80–100)
MONOCYTES # BLD: 0.3 K/UL (ref 0–1.3)
MONOCYTES NFR BLD: 5.3 %
NEUTROPHILS # BLD: 3.4 K/UL (ref 1.7–7.7)
NEUTROPHILS NFR BLD: 66.3 %
PLATELET # BLD AUTO: 254 K/UL (ref 135–450)
PMV BLD AUTO: 7.4 FL (ref 5–10.5)
POTASSIUM SERPL-SCNC: 3.8 MMOL/L (ref 3.5–5.1)
RBC # BLD AUTO: 3.66 M/UL (ref 4–5.2)
SODIUM SERPL-SCNC: 138 MMOL/L (ref 136–145)
TROPONIN, HIGH SENSITIVITY: <6 NG/L (ref 0–14)
WBC # BLD AUTO: 5.1 K/UL (ref 4–11)

## 2023-06-28 PROCEDURE — 99285 EMERGENCY DEPT VISIT HI MDM: CPT

## 2023-06-28 PROCEDURE — 84484 ASSAY OF TROPONIN QUANT: CPT

## 2023-06-28 PROCEDURE — 84703 CHORIONIC GONADOTROPIN ASSAY: CPT

## 2023-06-28 PROCEDURE — 93010 ELECTROCARDIOGRAM REPORT: CPT | Performed by: INTERNAL MEDICINE

## 2023-06-28 PROCEDURE — G0378 HOSPITAL OBSERVATION PER HR: HCPCS

## 2023-06-28 PROCEDURE — 80048 BASIC METABOLIC PNL TOTAL CA: CPT

## 2023-06-28 PROCEDURE — 71046 X-RAY EXAM CHEST 2 VIEWS: CPT

## 2023-06-28 PROCEDURE — 94760 N-INVAS EAR/PLS OXIMETRY 1: CPT

## 2023-06-28 PROCEDURE — 93005 ELECTROCARDIOGRAM TRACING: CPT | Performed by: EMERGENCY MEDICINE

## 2023-06-28 PROCEDURE — 85025 COMPLETE CBC W/AUTO DIFF WBC: CPT

## 2023-06-28 PROCEDURE — 36415 COLL VENOUS BLD VENIPUNCTURE: CPT

## 2023-06-28 PROCEDURE — 85379 FIBRIN DEGRADATION QUANT: CPT

## 2023-06-28 PROCEDURE — 6370000000 HC RX 637 (ALT 250 FOR IP): Performed by: EMERGENCY MEDICINE

## 2023-06-28 RX ORDER — ACETAMINOPHEN 325 MG/1
650 TABLET ORAL EVERY 6 HOURS PRN
Status: DISCONTINUED | OUTPATIENT
Start: 2023-06-28 | End: 2023-06-28 | Stop reason: HOSPADM

## 2023-06-28 RX ORDER — LAMOTRIGINE 100 MG/1
100 TABLET ORAL DAILY
COMMUNITY

## 2023-06-28 RX ORDER — ENOXAPARIN SODIUM 100 MG/ML
40 INJECTION SUBCUTANEOUS EVERY 24 HOURS
Status: DISCONTINUED | OUTPATIENT
Start: 2023-06-28 | End: 2023-06-28 | Stop reason: HOSPADM

## 2023-06-28 RX ORDER — SODIUM CHLORIDE 9 MG/ML
INJECTION, SOLUTION INTRAVENOUS PRN
Status: DISCONTINUED | OUTPATIENT
Start: 2023-06-28 | End: 2023-06-28 | Stop reason: HOSPADM

## 2023-06-28 RX ORDER — ASPIRIN 325 MG
325 TABLET, DELAYED RELEASE (ENTERIC COATED) ORAL ONCE
Status: DISCONTINUED | OUTPATIENT
Start: 2023-06-28 | End: 2023-06-28 | Stop reason: HOSPADM

## 2023-06-28 RX ORDER — NICOTINE 21 MG/24HR
1 PATCH, TRANSDERMAL 24 HOURS TRANSDERMAL DAILY
Qty: 42 PATCH | Refills: 0 | Status: SHIPPED | OUTPATIENT
Start: 2023-06-28 | End: 2023-08-09

## 2023-06-28 RX ORDER — ACETAMINOPHEN 650 MG/1
650 SUPPOSITORY RECTAL EVERY 6 HOURS PRN
Status: DISCONTINUED | OUTPATIENT
Start: 2023-06-28 | End: 2023-06-28 | Stop reason: HOSPADM

## 2023-06-28 RX ORDER — ASPIRIN 81 MG/1
81 TABLET, CHEWABLE ORAL DAILY
Status: DISCONTINUED | OUTPATIENT
Start: 2023-06-29 | End: 2023-06-28 | Stop reason: HOSPADM

## 2023-06-28 RX ORDER — ATORVASTATIN CALCIUM 40 MG/1
40 TABLET, FILM COATED ORAL NIGHTLY
Status: DISCONTINUED | OUTPATIENT
Start: 2023-06-28 | End: 2023-06-28 | Stop reason: HOSPADM

## 2023-06-28 RX ORDER — SODIUM CHLORIDE 0.9 % (FLUSH) 0.9 %
5-40 SYRINGE (ML) INJECTION PRN
Status: DISCONTINUED | OUTPATIENT
Start: 2023-06-28 | End: 2023-06-28 | Stop reason: HOSPADM

## 2023-06-28 RX ORDER — ONDANSETRON 4 MG/1
4 TABLET, ORALLY DISINTEGRATING ORAL EVERY 8 HOURS PRN
Status: DISCONTINUED | OUTPATIENT
Start: 2023-06-28 | End: 2023-06-28 | Stop reason: HOSPADM

## 2023-06-28 RX ORDER — SODIUM CHLORIDE 0.9 % (FLUSH) 0.9 %
5-40 SYRINGE (ML) INJECTION EVERY 12 HOURS SCHEDULED
Status: DISCONTINUED | OUTPATIENT
Start: 2023-06-28 | End: 2023-06-28 | Stop reason: HOSPADM

## 2023-06-28 RX ORDER — LAMOTRIGINE 100 MG/1
300 TABLET ORAL 2 TIMES DAILY
Status: DISCONTINUED | OUTPATIENT
Start: 2023-06-28 | End: 2023-06-28 | Stop reason: HOSPADM

## 2023-06-28 RX ORDER — PANTOPRAZOLE SODIUM 40 MG/1
40 TABLET, DELAYED RELEASE ORAL
Status: DISCONTINUED | OUTPATIENT
Start: 2023-06-29 | End: 2023-06-28 | Stop reason: HOSPADM

## 2023-06-28 RX ORDER — VERAPAMIL HYDROCHLORIDE 80 MG/1
80 TABLET ORAL 2 TIMES DAILY
Status: DISCONTINUED | OUTPATIENT
Start: 2023-06-28 | End: 2023-06-28 | Stop reason: HOSPADM

## 2023-06-28 RX ORDER — POLYETHYLENE GLYCOL 3350 17 G/17G
17 POWDER, FOR SOLUTION ORAL DAILY PRN
Status: DISCONTINUED | OUTPATIENT
Start: 2023-06-28 | End: 2023-06-28 | Stop reason: HOSPADM

## 2023-06-28 RX ORDER — LEVETIRACETAM 500 MG/1
1500 TABLET ORAL 2 TIMES DAILY
Status: DISCONTINUED | OUTPATIENT
Start: 2023-06-28 | End: 2023-06-28 | Stop reason: HOSPADM

## 2023-06-28 RX ORDER — M-VIT,TX,IRON,MINS/CALC/FOLIC 27MG-0.4MG
1 TABLET ORAL DAILY
COMMUNITY

## 2023-06-28 RX ORDER — LAMOTRIGINE 200 MG/1
200 TABLET ORAL DAILY
COMMUNITY

## 2023-06-28 RX ORDER — FERROUS SULFATE 325(65) MG
325 TABLET ORAL
COMMUNITY

## 2023-06-28 RX ORDER — SUMATRIPTAN 25 MG/1
25 TABLET, FILM COATED ORAL
COMMUNITY

## 2023-06-28 RX ORDER — PANTOPRAZOLE SODIUM 40 MG/1
40 TABLET, DELAYED RELEASE ORAL
Qty: 30 TABLET | Refills: 3 | Status: SHIPPED | OUTPATIENT
Start: 2023-06-29

## 2023-06-28 RX ORDER — ONDANSETRON 2 MG/ML
4 INJECTION INTRAMUSCULAR; INTRAVENOUS EVERY 6 HOURS PRN
Status: DISCONTINUED | OUTPATIENT
Start: 2023-06-28 | End: 2023-06-28 | Stop reason: HOSPADM

## 2023-06-28 RX ADMIN — NITROGLYCERIN 0.5 INCH: 20 OINTMENT TOPICAL at 10:16

## 2023-06-28 ASSESSMENT — HEART SCORE: ECG: 0

## 2023-06-28 ASSESSMENT — ENCOUNTER SYMPTOMS
SHORTNESS OF BREATH: 1
NAUSEA: 0
COUGH: 1
ABDOMINAL PAIN: 0
VOMITING: 0

## 2023-06-28 ASSESSMENT — PAIN - FUNCTIONAL ASSESSMENT: PAIN_FUNCTIONAL_ASSESSMENT: 0-10

## 2023-06-28 ASSESSMENT — PAIN SCALES - GENERAL: PAINLEVEL_OUTOF10: 6

## 2024-03-26 ENCOUNTER — OFFICE VISIT (OUTPATIENT)
Dept: FAMILY MEDICINE CLINIC | Age: 44
End: 2024-03-26
Payer: COMMERCIAL

## 2024-03-26 VITALS
OXYGEN SATURATION: 98 % | TEMPERATURE: 98 F | HEIGHT: 65 IN | RESPIRATION RATE: 18 BRPM | SYSTOLIC BLOOD PRESSURE: 124 MMHG | BODY MASS INDEX: 37.89 KG/M2 | DIASTOLIC BLOOD PRESSURE: 80 MMHG | HEART RATE: 65 BPM | WEIGHT: 227.4 LBS

## 2024-03-26 DIAGNOSIS — G40.009 PARTIAL IDIOPATHIC EPILEPSY WITH SEIZURES OF LOCALIZED ONSET, NOT INTRACTABLE, WITHOUT STATUS EPILEPTICUS (HCC): ICD-10-CM

## 2024-03-26 DIAGNOSIS — Z12.11 COLON CANCER SCREENING: ICD-10-CM

## 2024-03-26 DIAGNOSIS — I10 ESSENTIAL HYPERTENSION: ICD-10-CM

## 2024-03-26 PROBLEM — E55.9 VITAMIN D DEFICIENCY: Status: ACTIVE | Noted: 2020-03-18

## 2024-03-26 PROBLEM — L03.311 CELLULITIS OF ABDOMINAL WALL: Status: ACTIVE | Noted: 2017-05-25

## 2024-03-26 PROBLEM — G40.109: Status: ACTIVE | Noted: 2022-02-23

## 2024-03-26 PROBLEM — R07.2 PRECORDIAL CHEST PAIN: Status: ACTIVE | Noted: 2023-05-24

## 2024-03-26 PROBLEM — M25.561 ACUTE PAIN OF RIGHT KNEE: Status: ACTIVE | Noted: 2019-10-18

## 2024-03-26 PROBLEM — D57.3 SICKLE CELL TRAIT (HCC): Status: ACTIVE | Noted: 2018-09-08

## 2024-03-26 PROBLEM — D64.9 ANEMIA: Status: ACTIVE | Noted: 2018-09-08

## 2024-03-26 PROBLEM — M54.41 CHRONIC BILATERAL LOW BACK PAIN WITH RIGHT-SIDED SCIATICA: Status: ACTIVE | Noted: 2019-10-18

## 2024-03-26 PROBLEM — E61.1 IRON DEFICIENCY: Status: ACTIVE | Noted: 2018-09-11

## 2024-03-26 PROBLEM — R53.83 FATIGUE: Status: ACTIVE | Noted: 2017-05-25

## 2024-03-26 PROBLEM — G89.29 CHRONIC BILATERAL LOW BACK PAIN WITH RIGHT-SIDED SCIATICA: Status: ACTIVE | Noted: 2019-10-18

## 2024-03-26 PROBLEM — I82.409 DVT (DEEP VENOUS THROMBOSIS) (HCC): Status: ACTIVE | Noted: 2024-03-26

## 2024-03-26 PROBLEM — M17.12 ARTHRITIS OF LEFT KNEE: Status: ACTIVE | Noted: 2023-04-12

## 2024-03-26 PROBLEM — M53.3 SACROILIAC JOINT DYSFUNCTION OF LEFT SIDE: Status: ACTIVE | Noted: 2020-08-13

## 2024-03-26 LAB
ALBUMIN SERPL-MCNC: 5 G/DL (ref 3.4–5)
ALBUMIN/GLOB SERPL: 2.1 {RATIO} (ref 1.1–2.2)
ALP SERPL-CCNC: 89 U/L (ref 40–129)
ALT SERPL-CCNC: 12 U/L (ref 10–40)
ANION GAP SERPL CALCULATED.3IONS-SCNC: 12 MMOL/L (ref 3–16)
AST SERPL-CCNC: 12 U/L (ref 15–37)
BILIRUB SERPL-MCNC: 0.8 MG/DL (ref 0–1)
BUN SERPL-MCNC: 10 MG/DL (ref 7–20)
CALCIUM SERPL-MCNC: 9.9 MG/DL (ref 8.3–10.6)
CHLORIDE SERPL-SCNC: 103 MMOL/L (ref 99–110)
CHOLEST SERPL-MCNC: 187 MG/DL (ref 0–199)
CO2 SERPL-SCNC: 25 MMOL/L (ref 21–32)
CREAT SERPL-MCNC: 0.8 MG/DL (ref 0.6–1.1)
GFR SERPLBLD CREATININE-BSD FMLA CKD-EPI: >90 ML/MIN/{1.73_M2}
GLUCOSE SERPL-MCNC: 87 MG/DL (ref 70–99)
HDLC SERPL-MCNC: 54 MG/DL (ref 40–60)
LDLC SERPL CALC-MCNC: 123 MG/DL
POTASSIUM SERPL-SCNC: 4.2 MMOL/L (ref 3.5–5.1)
PROT SERPL-MCNC: 7.4 G/DL (ref 6.4–8.2)
SODIUM SERPL-SCNC: 140 MMOL/L (ref 136–145)
TRIGL SERPL-MCNC: 50 MG/DL (ref 0–150)
VLDLC SERPL CALC-MCNC: 10 MG/DL

## 2024-03-26 PROCEDURE — 99203 OFFICE O/P NEW LOW 30 MIN: CPT | Performed by: NURSE PRACTITIONER

## 2024-03-26 PROCEDURE — 3079F DIAST BP 80-89 MM HG: CPT | Performed by: NURSE PRACTITIONER

## 2024-03-26 PROCEDURE — G8417 CALC BMI ABV UP PARAM F/U: HCPCS | Performed by: NURSE PRACTITIONER

## 2024-03-26 PROCEDURE — 3074F SYST BP LT 130 MM HG: CPT | Performed by: NURSE PRACTITIONER

## 2024-03-26 PROCEDURE — G8427 DOCREV CUR MEDS BY ELIG CLIN: HCPCS | Performed by: NURSE PRACTITIONER

## 2024-03-26 PROCEDURE — G8484 FLU IMMUNIZE NO ADMIN: HCPCS | Performed by: NURSE PRACTITIONER

## 2024-03-26 PROCEDURE — 1036F TOBACCO NON-USER: CPT | Performed by: NURSE PRACTITIONER

## 2024-03-26 RX ORDER — SPIRONOLACTONE 50 MG/1
50 TABLET, FILM COATED ORAL DAILY
COMMUNITY

## 2024-03-26 RX ORDER — MELOXICAM 15 MG/1
15 TABLET ORAL DAILY
COMMUNITY

## 2024-03-26 RX ORDER — LAMOTRIGINE 300 MG/1
300 TABLET, EXTENDED RELEASE ORAL 2 TIMES DAILY
COMMUNITY

## 2024-03-26 SDOH — ECONOMIC STABILITY: FOOD INSECURITY: WITHIN THE PAST 12 MONTHS, THE FOOD YOU BOUGHT JUST DIDN'T LAST AND YOU DIDN'T HAVE MONEY TO GET MORE.: OFTEN TRUE

## 2024-03-26 SDOH — ECONOMIC STABILITY: INCOME INSECURITY: HOW HARD IS IT FOR YOU TO PAY FOR THE VERY BASICS LIKE FOOD, HOUSING, MEDICAL CARE, AND HEATING?: NOT HARD AT ALL

## 2024-03-26 SDOH — ECONOMIC STABILITY: FOOD INSECURITY: WITHIN THE PAST 12 MONTHS, YOU WORRIED THAT YOUR FOOD WOULD RUN OUT BEFORE YOU GOT MONEY TO BUY MORE.: OFTEN TRUE

## 2024-03-26 SDOH — ECONOMIC STABILITY: HOUSING INSECURITY
IN THE LAST 12 MONTHS, WAS THERE A TIME WHEN YOU DID NOT HAVE A STEADY PLACE TO SLEEP OR SLEPT IN A SHELTER (INCLUDING NOW)?: NO

## 2024-03-26 ASSESSMENT — PATIENT HEALTH QUESTIONNAIRE - PHQ9
SUM OF ALL RESPONSES TO PHQ QUESTIONS 1-9: 0
SUM OF ALL RESPONSES TO PHQ QUESTIONS 1-9: 0
2. FEELING DOWN, DEPRESSED OR HOPELESS: NOT AT ALL
SUM OF ALL RESPONSES TO PHQ9 QUESTIONS 1 & 2: 0
1. LITTLE INTEREST OR PLEASURE IN DOING THINGS: NOT AT ALL
SUM OF ALL RESPONSES TO PHQ QUESTIONS 1-9: 0
SUM OF ALL RESPONSES TO PHQ QUESTIONS 1-9: 0

## 2024-03-26 NOTE — PATIENT INSTRUCTIONS
[facility] Food Resources*  (Call 211 if need more resources.)          Feeding [place]   What they offer: [place] [place] is a nationwide network of food vincent, food pantries, and meal programs.  Website: https://www.feeding[place]ica.org/find-your-local-foodbank      National Hunger Hotline  What they offer: The hotline is a resource for individuals and families seeking information on how and where to obtain food.   Website:  https://www.hungerfreeRedKite Financial Marketsa.org/en-us/[facility]-national-hunger-hotline    Hunger Hotline Phone Number: 3-757-2-JULY (1-888.191.9265)  Hours of Operation: Monday - Friday 7am to 10pm   The Hunger Hotline also operates a texting service. Our number is 265-962-8713. Please note that these are automated texts and we do not reply or monitor texts.   [employer] [place]  What they offer: $1 for $1 matching for families receiving SNAP/food stamps when spent on “healthy” food.  The match money can be used to purchase fruits and vegetables so adds more healthy food choices for SNAP beneficiaries.   Contact: https://[employer]/locations/   SNAP (formerly Food [place])   What they offer: SNAP is used like cash to buy eligible food items from authorized retailers.  Apply for benefits online: https://Amide[employer]ios.[place].gov/ofam/foodstamps.stm     Apply for benefits by phone or in-person by visiting your local Job and Family Services. Locate your [facility]’s Job and family services by searching the directory at [email or web address]_Directory.stm           Meals on Wheels   What they offer: Meals on Wheels is a program that delivers meals to individuals who have no reliable means for maintaining a healthy diet.       To Apply:   Ages 18-59:  Apply online: https://www.[employer].org/  Apply by phone: [phone]) [phone]    Ages 60+:   [place] on Aging: [phone]      [employer]  What they offer: Serves neighbors in [facility] through its network of Conferences

## 2024-03-26 NOTE — PROGRESS NOTES
3/26/2024    Eliud Echols (:  1980) is a 43 y.o. female, here for evaluation of the following medical concerns:  Chief Complaint   Patient presents with    New Patient     Establish care. Has hx of stroke and heart attack.  Has swelling in legs.  Legs are darker from thigh down. Has random dark spots on skin.        HPI  Patient is here for a new patient visit. Previous PCP was Dr. Seo.     Cardiologist Dr. Sanchez. She reports that she has had a prior heart attack and minor stroke but I do not see this listed in past cardiology or neurology notes.     Neurologist Dr. Boateng- history of partial idiopathic epilepsy with seizures   Dx when she was 41. History of grand mal seizures.   On keppra 750 two tabs twice daily, lamictal 300 mg BID    On verapamil 80 mg twice daily for headaches and sumatriptan PRN. Reports that she has not had a migraine in over a year.     Follows with back specialist Dr. Vazquez. On mobic 15 mg daily. Just started in 2024. Trying to get approval for epidural injection for chronic back pain    Dermatologist started her on spironolactone 50 mg daily for acne vulgaris. F/u in 3 months.     Hypertension:  Home blood pressure monitoring: No.  She is not adherent to a low sodium diet. Patient denies chest pain, shortness of breath, headache, peripheral edema, palpitations, and dry cough.  Antihypertensive medication side effects: no medication side effects noted.     On verapamil 80 mg BID and spironolactone 50 mg daily     Exercise- 20 minutes on weekends   Diet- reports that she tries to eat healthy       No results found for: \"LABA1C\"  Lab Results   Component Value Date    CREATININE 0.8 2023     Lab Results   Component Value Date    ALT 9 (L) 2021    AST 13 (L) 2021     No results found for: \"CHOL\", \"TRIG\", \"HDL\", \"LDLCALC\", \"LDLDIRECT\"     Dr. Singh is GYN  Recently had pap smear that was normal.     Daughter is 23 and son is 7    at  [employer]

## 2024-03-27 PROBLEM — R07.9 CHEST PAIN: Status: RESOLVED | Noted: 2023-06-28 | Resolved: 2024-03-27

## 2024-03-27 PROBLEM — M25.561 ACUTE PAIN OF RIGHT KNEE: Status: RESOLVED | Noted: 2019-10-18 | Resolved: 2024-03-27

## 2024-03-27 ASSESSMENT — ENCOUNTER SYMPTOMS
NAUSEA: 0
SHORTNESS OF BREATH: 0
CHEST TIGHTNESS: 0
DIARRHEA: 0
VOMITING: 0
CONSTIPATION: 0

## 2024-04-24 ENCOUNTER — HOSPITAL ENCOUNTER (EMERGENCY)
Age: 44
Discharge: LEFT BEFORE DISCHARGE | End: 2024-04-24
Payer: COMMERCIAL

## 2024-04-24 ENCOUNTER — APPOINTMENT (OUTPATIENT)
Dept: CT IMAGING | Age: 44
End: 2024-04-24
Payer: COMMERCIAL

## 2024-04-24 VITALS
SYSTOLIC BLOOD PRESSURE: 110 MMHG | TEMPERATURE: 98.1 F | WEIGHT: 223.33 LBS | BODY MASS INDEX: 37.21 KG/M2 | RESPIRATION RATE: 18 BRPM | HEART RATE: 80 BPM | HEIGHT: 65 IN | DIASTOLIC BLOOD PRESSURE: 78 MMHG | OXYGEN SATURATION: 99 %
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
## 2024-04-24 DIAGNOSIS — M54.10 BACK PAIN WITH RADICULOPATHY: Primary | ICD-10-CM

## 2024-04-24 LAB — HCG UR QL: NEGATIVE

## 2024-04-24 PROCEDURE — 6370000000 HC RX 637 (ALT 250 FOR IP): Performed by: PHYSICIAN ASSISTANT

## 2024-04-24 PROCEDURE — 99283 EMERGENCY DEPT VISIT LOW MDM: CPT

## 2024-04-24 PROCEDURE — 84703 CHORIONIC GONADOTROPIN ASSAY: CPT

## 2024-04-24 RX ORDER — ACETAMINOPHEN 500 MG
1000 TABLET ORAL ONCE
Status: COMPLETED | OUTPATIENT
Start: 2024-04-24 | End: 2024-04-24

## 2024-04-24 RX ADMIN — ACETAMINOPHEN 1000 MG: 500 TABLET ORAL at 21:03

## 2024-04-24 ASSESSMENT — LIFESTYLE VARIABLES
HOW MANY STANDARD DRINKS CONTAINING ALCOHOL DO YOU HAVE ON A TYPICAL DAY: PATIENT DOES NOT DRINK
HOW OFTEN DO YOU HAVE A DRINK CONTAINING ALCOHOL: NEVER

## 2024-04-24 ASSESSMENT — PAIN DESCRIPTION - LOCATION
LOCATION: LEG;HIP
LOCATION: HIP;LEG

## 2024-04-24 ASSESSMENT — PAIN - FUNCTIONAL ASSESSMENT: PAIN_FUNCTIONAL_ASSESSMENT: 0-10

## 2024-04-24 ASSESSMENT — PAIN DESCRIPTION - ORIENTATION
ORIENTATION: LEFT
ORIENTATION: LEFT

## 2024-04-24 ASSESSMENT — PAIN SCALES - GENERAL
PAINLEVEL_OUTOF10: 10
PAINLEVEL_OUTOF10: 10

## 2024-04-25 ASSESSMENT — ENCOUNTER SYMPTOMS
BACK PAIN: 1
EYE PAIN: 0
NAUSEA: 0
SORE THROAT: 0
SHORTNESS OF BREATH: 0
COUGH: 0
VOMITING: 0
ABDOMINAL PAIN: 0

## 2024-04-25 NOTE — ED NOTES
Followed up with Eliud Echols on 4/24/2024 at 9:53 PM. Patient left the ED with a disposition of AMA on . Patient cited wait time as reason. Advised patient to follow up with a primary care physician or return to the Emergency Department if symptoms worsen.   Linda Sampson RN

## 2024-04-25 NOTE — ED PROVIDER NOTES
No discharge.   Cardiovascular:      Rate and Rhythm: Normal rate and regular rhythm.      Heart sounds: Normal heart sounds. No murmur heard.     No friction rub. No gallop.   Pulmonary:      Effort: Pulmonary effort is normal. No respiratory distress.      Breath sounds: Normal breath sounds. No wheezing or rales.   Chest:      Chest wall: No tenderness.   Abdominal:      General: Bowel sounds are normal. There is no distension.      Palpations: Abdomen is soft. There is no mass.      Tenderness: There is no abdominal tenderness. There is no guarding or rebound.   Musculoskeletal:         General: Normal range of motion.      Cervical back: Normal, full passive range of motion without pain, normal range of motion and neck supple. No spinous process tenderness or muscular tenderness.      Thoracic back: Normal.      Lumbar back: Tenderness present. No deformity, spasms or bony tenderness. Normal range of motion. Negative right straight leg raise test and negative left straight leg raise test.        Back:    Skin:     General: Skin is warm and dry.   Neurological:      General: No focal deficit present.      Mental Status: She is alert and oriented to person, place, and time.      Sensory: Sensation is intact.      Motor: Motor function is intact.      Coordination: Coordination is intact.      Gait: Gait is intact.   Psychiatric:         Behavior: Behavior normal.         MEDICAL DECISION MAKING    Vitals:    Vitals:    04/24/24 2006 04/24/24 2008   BP:  110/78   Pulse: 80    Resp: 18    Temp: 98.1 °F (36.7 °C)    TempSrc: Oral    SpO2: 99%    Weight: 101.3 kg (223 lb 5.2 oz)    Height: 1.651 m (5' 5\")        LABS:  Labs Reviewed   PREGNANCY, URINE        Remainder of labs reviewed and were negative at this time or not returned at the time of this note.    RADIOLOGY:   Non-plain film images such as CT, Ultrasound and MRI are read by the radiologist. Noel MCGRATH PA-C have directly visualized the radiologic

## 2024-04-25 NOTE — ED TRIAGE NOTES
Pt arrived in department for a c/c of fall and hip pain. Pt fell at [facility] about 1 hour ago. Pt states that her foot came out from under her. Pt is complaining of pain in her left hip down her thigh into her leg. Pt walked into the department. Pt states that she has hx of degenerative disc disease     VS noted and stable.Patient A&Ox4. Respirations easy and unlabored. Skin warm and dry and appropriate for ethnicity. No acute distress noted at this time. Patient placed on continuous telemetry and pulse ox upon arrival to room.